# Patient Record
Sex: FEMALE | Race: WHITE | NOT HISPANIC OR LATINO | Employment: FULL TIME | URBAN - NONMETROPOLITAN AREA
[De-identification: names, ages, dates, MRNs, and addresses within clinical notes are randomized per-mention and may not be internally consistent; named-entity substitution may affect disease eponyms.]

---

## 2017-02-15 ENCOUNTER — APPOINTMENT (OUTPATIENT)
Dept: ULTRASOUND IMAGING | Facility: HOSPITAL | Age: 52
End: 2017-02-15

## 2017-02-15 ENCOUNTER — ANESTHESIA EVENT (OUTPATIENT)
Dept: PERIOP | Facility: HOSPITAL | Age: 52
End: 2017-02-15

## 2017-02-15 ENCOUNTER — HOSPITAL ENCOUNTER (INPATIENT)
Facility: HOSPITAL | Age: 52
LOS: 3 days | Discharge: HOME | End: 2017-02-18
Attending: EMERGENCY MEDICINE | Admitting: INTERNAL MEDICINE

## 2017-02-15 ENCOUNTER — PREP FOR SURGERY (OUTPATIENT)
Dept: GASTROENTEROLOGY | Facility: CLINIC | Age: 52
End: 2017-02-15

## 2017-02-15 ENCOUNTER — ANESTHESIA (OUTPATIENT)
Dept: PERIOP | Facility: HOSPITAL | Age: 52
End: 2017-02-15

## 2017-02-15 ENCOUNTER — APPOINTMENT (OUTPATIENT)
Dept: CT IMAGING | Facility: HOSPITAL | Age: 52
End: 2017-02-15

## 2017-02-15 DIAGNOSIS — K80.50 CHOLEDOCHOLITHIASIS: Primary | ICD-10-CM

## 2017-02-15 DIAGNOSIS — K83.1 OBSTRUCTIVE JAUNDICE: ICD-10-CM

## 2017-02-15 LAB
ALBUMIN SERPL-MCNC: 4.1 G/DL (ref 3.5–5)
ALBUMIN/GLOB SERPL: 1.3 G/DL (ref 1–2)
ALP SERPL-CCNC: 109 U/L (ref 38–126)
ALT SERPL W P-5'-P-CCNC: 615 U/L (ref 13–69)
ANION GAP SERPL CALCULATED.3IONS-SCNC: 12.7 MMOL/L
AST SERPL-CCNC: 660 U/L (ref 15–46)
BACTERIA UR QL AUTO: ABNORMAL /HPF
BASOPHILS # BLD AUTO: 0.03 10*3/MM3 (ref 0–0.2)
BASOPHILS NFR BLD AUTO: 0.6 % (ref 0–2.5)
BILIRUB SERPL-MCNC: 2 MG/DL (ref 0.2–1.3)
BILIRUB UR QL STRIP: ABNORMAL
BUN BLD-MCNC: 13 MG/DL (ref 7–20)
BUN/CREAT SERPL: 16.3 (ref 7.1–23.5)
CALCIUM SPEC-SCNC: 10.2 MG/DL (ref 8.4–10.2)
CHLORIDE SERPL-SCNC: 106 MMOL/L (ref 98–107)
CLARITY UR: CLEAR
CO2 SERPL-SCNC: 28 MMOL/L (ref 26–30)
COLOR UR: YELLOW
CREAT BLD-MCNC: 0.8 MG/DL (ref 0.6–1.3)
DEPRECATED RDW RBC AUTO: 43.2 FL (ref 37–54)
EOSINOPHIL # BLD AUTO: 0.17 10*3/MM3 (ref 0–0.7)
EOSINOPHIL NFR BLD AUTO: 3.3 % (ref 0–7)
ERYTHROCYTE [DISTWIDTH] IN BLOOD BY AUTOMATED COUNT: 12 % (ref 11.5–14.5)
GFR SERPL CREATININE-BSD FRML MDRD: 76 ML/MIN/1.73
GLOBULIN UR ELPH-MCNC: 3.1 GM/DL
GLUCOSE BLD-MCNC: 108 MG/DL (ref 74–98)
GLUCOSE UR STRIP-MCNC: NEGATIVE MG/DL
HCT VFR BLD AUTO: 45.1 % (ref 37–47)
HGB BLD-MCNC: 15.7 G/DL (ref 12–16)
HGB UR QL STRIP.AUTO: ABNORMAL
HOLD SPECIMEN: NORMAL
HYALINE CASTS UR QL AUTO: ABNORMAL /LPF
IMM GRANULOCYTES # BLD: 0.04 10*3/MM3 (ref 0–0.06)
IMM GRANULOCYTES NFR BLD: 0.8 % (ref 0–0.6)
INR PPP: 1.03 (ref 0.9–1.1)
KETONES UR QL STRIP: NEGATIVE
LEUKOCYTE ESTERASE UR QL STRIP.AUTO: NEGATIVE
LIPASE SERPL-CCNC: 142 U/L (ref 23–300)
LYMPHOCYTES # BLD AUTO: 1.53 10*3/MM3 (ref 0.6–3.4)
LYMPHOCYTES NFR BLD AUTO: 29.9 % (ref 10–50)
MCH RBC QN AUTO: 33.7 PG (ref 27–31)
MCHC RBC AUTO-ENTMCNC: 34.8 G/DL (ref 30–37)
MCV RBC AUTO: 96.8 FL (ref 81–99)
MONOCYTES # BLD AUTO: 0.46 10*3/MM3 (ref 0–0.9)
MONOCYTES NFR BLD AUTO: 9 % (ref 0–12)
MUCOUS THREADS URNS QL MICRO: ABNORMAL /HPF
NEUTROPHILS # BLD AUTO: 2.88 10*3/MM3 (ref 2–6.9)
NEUTROPHILS NFR BLD AUTO: 56.4 % (ref 37–80)
NITRITE UR QL STRIP: NEGATIVE
NRBC BLD MANUAL-RTO: 0 /100 WBC (ref 0–0)
PH UR STRIP.AUTO: 6 [PH] (ref 5–8)
PLATELET # BLD AUTO: 174 10*3/MM3 (ref 130–400)
PMV BLD AUTO: 10.7 FL (ref 6–12)
POTASSIUM BLD-SCNC: 4.7 MMOL/L (ref 3.5–5.1)
PROT SERPL-MCNC: 7.2 G/DL (ref 6.3–8.2)
PROT UR QL STRIP: NEGATIVE
PROTHROMBIN TIME: 11.3 SECONDS (ref 9.3–12.1)
RBC # BLD AUTO: 4.66 10*6/MM3 (ref 4.2–5.4)
RBC # UR: ABNORMAL /HPF
REF LAB TEST METHOD: ABNORMAL
SODIUM BLD-SCNC: 142 MMOL/L (ref 137–145)
SP GR UR STRIP: 1.02 (ref 1–1.03)
SQUAMOUS #/AREA URNS HPF: ABNORMAL /HPF
UROBILINOGEN UR QL STRIP: ABNORMAL
WBC NRBC COR # BLD: 5.11 10*3/MM3 (ref 4.8–10.8)
WBC UR QL AUTO: ABNORMAL /HPF
WHOLE BLOOD HOLD SPECIMEN: NORMAL
WHOLE BLOOD HOLD SPECIMEN: NORMAL

## 2017-02-15 PROCEDURE — 76705 ECHO EXAM OF ABDOMEN: CPT

## 2017-02-15 PROCEDURE — 25010000002 PIPERACILLIN SOD-TAZOBACTAM PER 1 G: Performed by: EMERGENCY MEDICINE

## 2017-02-15 PROCEDURE — 93005 ELECTROCARDIOGRAM TRACING: CPT | Performed by: INTERNAL MEDICINE

## 2017-02-15 PROCEDURE — 99284 EMERGENCY DEPT VISIT MOD MDM: CPT

## 2017-02-15 PROCEDURE — 0 IOPAMIDOL 61 % SOLUTION: Performed by: EMERGENCY MEDICINE

## 2017-02-15 PROCEDURE — 81001 URINALYSIS AUTO W/SCOPE: CPT | Performed by: EMERGENCY MEDICINE

## 2017-02-15 PROCEDURE — 85025 COMPLETE CBC W/AUTO DIFF WBC: CPT | Performed by: EMERGENCY MEDICINE

## 2017-02-15 PROCEDURE — 74177 CT ABD & PELVIS W/CONTRAST: CPT

## 2017-02-15 PROCEDURE — 99253 IP/OBS CNSLTJ NEW/EST LOW 45: CPT | Performed by: INTERNAL MEDICINE

## 2017-02-15 PROCEDURE — 25010000002 ONDANSETRON PER 1 MG: Performed by: EMERGENCY MEDICINE

## 2017-02-15 PROCEDURE — 25010000002 MORPHINE PER 10 MG: Performed by: INTERNAL MEDICINE

## 2017-02-15 PROCEDURE — 83690 ASSAY OF LIPASE: CPT | Performed by: EMERGENCY MEDICINE

## 2017-02-15 PROCEDURE — 80053 COMPREHEN METABOLIC PANEL: CPT | Performed by: EMERGENCY MEDICINE

## 2017-02-15 PROCEDURE — 99254 IP/OBS CNSLTJ NEW/EST MOD 60: CPT | Performed by: SURGERY

## 2017-02-15 PROCEDURE — 94660 CPAP INITIATION&MGMT: CPT

## 2017-02-15 PROCEDURE — 85610 PROTHROMBIN TIME: CPT | Performed by: EMERGENCY MEDICINE

## 2017-02-15 PROCEDURE — 25010000002 MORPHINE PER 10 MG: Performed by: EMERGENCY MEDICINE

## 2017-02-15 PROCEDURE — 99223 1ST HOSP IP/OBS HIGH 75: CPT | Performed by: INTERNAL MEDICINE

## 2017-02-15 RX ORDER — MORPHINE SULFATE 4 MG/ML
4 INJECTION, SOLUTION INTRAMUSCULAR; INTRAVENOUS ONCE
Status: COMPLETED | OUTPATIENT
Start: 2017-02-15 | End: 2017-02-15

## 2017-02-15 RX ORDER — SODIUM CHLORIDE 9 MG/ML
125 INJECTION, SOLUTION INTRAVENOUS CONTINUOUS
Status: DISCONTINUED | OUTPATIENT
Start: 2017-02-15 | End: 2017-02-18 | Stop reason: HOSPADM

## 2017-02-15 RX ORDER — ONDANSETRON 2 MG/ML
4 INJECTION INTRAMUSCULAR; INTRAVENOUS ONCE
Status: COMPLETED | OUTPATIENT
Start: 2017-02-15 | End: 2017-02-16

## 2017-02-15 RX ORDER — SODIUM CHLORIDE 0.9 % (FLUSH) 0.9 %
1-10 SYRINGE (ML) INJECTION AS NEEDED
Status: DISCONTINUED | OUTPATIENT
Start: 2017-02-15 | End: 2017-02-16 | Stop reason: HOSPADM

## 2017-02-15 RX ORDER — SODIUM CHLORIDE 0.9 % (FLUSH) 0.9 %
10 SYRINGE (ML) INJECTION AS NEEDED
Status: DISCONTINUED | OUTPATIENT
Start: 2017-02-15 | End: 2017-02-18 | Stop reason: HOSPADM

## 2017-02-15 RX ORDER — NALOXONE HCL 0.4 MG/ML
0.4 VIAL (ML) INJECTION
Status: DISCONTINUED | OUTPATIENT
Start: 2017-02-15 | End: 2017-02-18 | Stop reason: HOSPADM

## 2017-02-15 RX ORDER — SODIUM CHLORIDE 9 MG/ML
70 INJECTION, SOLUTION INTRAVENOUS CONTINUOUS PRN
Status: DISCONTINUED | OUTPATIENT
Start: 2017-02-15 | End: 2017-02-16 | Stop reason: HOSPADM

## 2017-02-15 RX ORDER — SODIUM CHLORIDE 0.9 % (FLUSH) 0.9 %
1-10 SYRINGE (ML) INJECTION AS NEEDED
Status: DISCONTINUED | OUTPATIENT
Start: 2017-02-15 | End: 2017-02-18 | Stop reason: HOSPADM

## 2017-02-15 RX ORDER — MORPHINE SULFATE 2 MG/ML
2 INJECTION, SOLUTION INTRAMUSCULAR; INTRAVENOUS EVERY 4 HOURS PRN
Status: DISCONTINUED | OUTPATIENT
Start: 2017-02-15 | End: 2017-02-17

## 2017-02-15 RX ADMIN — MORPHINE SULFATE 2 MG: 2 INJECTION, SOLUTION INTRAMUSCULAR; INTRAVENOUS at 17:50

## 2017-02-15 RX ADMIN — TAZOBACTAM SODIUM AND PIPERACILLIN SODIUM 4.5 G: 500; 4 INJECTION, SOLUTION INTRAVENOUS at 09:29

## 2017-02-15 RX ADMIN — MORPHINE SULFATE 4 MG: 4 INJECTION, SOLUTION INTRAMUSCULAR; INTRAVENOUS at 10:37

## 2017-02-15 RX ADMIN — MORPHINE SULFATE 2 MG: 2 INJECTION, SOLUTION INTRAMUSCULAR; INTRAVENOUS at 23:22

## 2017-02-15 RX ADMIN — ONDANSETRON 4 MG: 2 INJECTION INTRAMUSCULAR; INTRAVENOUS at 10:37

## 2017-02-15 RX ADMIN — IOPAMIDOL 100 ML: 612 INJECTION, SOLUTION INTRAVENOUS at 08:17

## 2017-02-15 RX ADMIN — SODIUM CHLORIDE 125 ML/HR: 9 INJECTION, SOLUTION INTRAVENOUS at 17:52

## 2017-02-15 NOTE — PLAN OF CARE
Problem: Patient Care Overview (Adult)  Goal: Plan of Care Review  Outcome: Ongoing (interventions implemented as appropriate)    02/15/17 1622   Coping/Psychosocial Response Interventions   Plan Of Care Reviewed With patient   Patient Care Overview   Progress no change   Outcome Evaluation   Outcome Summary/Follow up Plan intermittent pain         Problem: Cholecystitis/Cholecystectomy (Adult)  Goal: Signs and Symptoms of Listed Potential Problems Will be Absent or Manageable (Cholecystitis/Cholecystectomy)  Outcome: Ongoing (interventions implemented as appropriate)

## 2017-02-15 NOTE — CONSULTS
Referring provider:  Robin Silvestre DO    Primary care provider: No Known Provider  Previously the patient has seen Dr. Kelley.    Date of consultation:  2/15/2017    Reason for consultation : Abdominal pain.    History:      This is a 51-year-old white female who presented to [facility] emergency room with a three-day history of recurrent epigastric and adjacent right upper quadrant abdominal pain.  The patient claims that she was doing reasonably well until about 3 days ago when she started having sudden episodes of upper abdominal pain.  The pain is described as sharp and rather severe in intensity.  The pain radiated to the right rib area.  The pain lasted for an hour or so.  This was followed by multiple episodes.  The pain was associated with nausea and episodes of emesis.  Earlier today the patient had worse pain.  This prompted her to seek attention.  Of interest that the patient admits to milder episodes of such abdominal pains off and on for the last 3 months.  The pain used to be mild and perhaps one to 2 times a month.  Previously there was no associated nausea or vomiting.    The patient has been feeling nauseated for the last couple of days.  This is described as mild to moderate.  Frequency being several times a day.  The nausea is associated with abdominal pain.  The patient had 4 episodes of emesis earlier on.  The emesis contained previously ingested food.    Since yesterday the patient has noticed lightning of stool color.  There is history of darkening of urine color.  The patient also admits to pruritus.  She denies history of jaundice.There is no history of overt GI bleed (Hematemesis, melena or hematochezia).    The patient has history of occasional reflux perhaps one sent to 3 months.  There is no dysphagia or odynophagia.  She denies history of diarrhea or constipation.  No recent change in bowel habits.  There is no history of liver or pancreatic disease in the  "past.    Over 10 years or so ago the patient had undergone partial colon resection for \"large polyp close to the rectum\" however since then the patient did not have a follow-up colonoscopy.  There is no family history of colon cancer.    The patient is rather overweight.  She has sleep apnea and uses CPAP.  The patient denies history of hypertension, coronary artery disease, congestive heart failure, valvular heart disease or arrhythmias.  There is no history of asthma or COPD.  She denies recent cough.  There is no sputum production.  There is no history of renal insufficiency.  She denies recent hematuria or dysuria.  The patient denies significant arthritic symptoms.  There is no history of SLE or rheumatoid arthritis.  There is no history of anemia.  She denies blood transfusion in the past.  There is no history of bleeding disorder.  The patient denies history of blood clots or PE.  There is no history of seizures or stroke.  The patient denies history of glaucoma.  There is no history of dizziness.    The patient is status post 2 C-sections, tubal ligation and partial colonic resection in the past.  Her last menstrual period was one year ago.  The patient is a smoker.        Past Medical History   Diagnosis Date   • Sleep apnea        Past Surgical History   Procedure Laterality Date   •  section       x2   • Colon surgery     • Polypectomy         Family History   Problem Relation Age of Onset   • Sarcoidosis Mother    • Cancer Father    • Heart disease Father        Social History   Substance Use Topics   • Smoking status: Current Every Day Smoker     Packs/day: 1.50     Types: Cigarettes   • Smokeless tobacco: Not on file   • Alcohol use Yes      Comment: rarely drinks wine         Review of Systems   Constitutional: Positive for fatigue and unexpected weight change. Negative for activity change, chills and fever.   HENT: Negative for congestion, dental problem, hearing loss, mouth sores, nosebleeds " "and trouble swallowing.    Eyes: Negative for photophobia and pain.   Respiratory: Positive for apnea. Negative for cough, choking, chest tightness, shortness of breath and wheezing.    Cardiovascular: Negative for chest pain, palpitations and leg swelling.   Gastrointestinal: Positive for nausea and vomiting. Negative for abdominal distention, blood in stool, constipation and diarrhea.   Endocrine: Negative for cold intolerance, heat intolerance, polydipsia, polyphagia and polyuria.   Genitourinary: Negative for difficulty urinating, dysuria and hematuria.   Musculoskeletal: Negative for arthralgias and myalgias.   Skin: Negative for color change, pallor, rash and wound.   Allergic/Immunologic: Negative for environmental allergies, food allergies and immunocompromised state.   Neurological: Negative for dizziness, seizures, weakness, light-headedness and headaches.   Hematological: Negative for adenopathy. Does not bruise/bleed easily.   Psychiatric/Behavioral: Negative for agitation, behavioral problems, confusion, hallucinations, sleep disturbance and suicidal ideas. The patient is not nervous/anxious.        No Known Allergies      Current Facility-Administered Medications:   •  morphine injection 2 mg, 2 mg, Intravenous, Q4H PRN, 2 mg at 02/15/17 1750 **AND** naloxone (NARCAN) injection 0.4 mg, 0.4 mg, Intravenous, Q5 Min PRN, Robin Silvestre, DO  •  sodium chloride 0.9 % flush 1-10 mL, 1-10 mL, Intravenous, PRN, Robin Silvestre,   •  sodium chloride 0.9 % flush 10 mL, 10 mL, Intravenous, PRN, Dillon Cruz MD  •  sodium chloride 0.9 % infusion, 125 mL/hr, Intravenous, Continuous, Dillon Cruz MD, Last Rate: 125 mL/hr at 02/15/17 1752, 125 mL/hr at 02/15/17 1752    Blood pressure 122/72, pulse 74, temperature 97.7 °F (36.5 °C), temperature source Oral, resp. rate 16, height 64\" (162.6 cm), weight 290 lb (132 kg), SpO2 98 %.    Physical Exam   Constitutional: She is oriented to person, place, " and time. She appears well-developed and well-nourished. No distress.   HENT:   Head: Normocephalic and atraumatic.   Right Ear: Hearing and external ear normal.   Left Ear: Hearing and external ear normal.   Nose: Nose normal.   Mouth/Throat: Mucous membranes are normal. Mucous membranes are not pale, not dry and not cyanotic. No oral lesions. No oropharyngeal exudate.   Oral mucosa dry.  No source no thrush.   Eyes: Conjunctivae and EOM are normal. Right eye exhibits no discharge. Left eye exhibits no discharge. Scleral icterus is present.   Neck: Trachea normal. Neck supple. No JVD present. No edema present. No thyroid mass and no thyromegaly present.   Cardiovascular: Normal rate, regular rhythm, S2 normal and normal heart sounds.  Exam reveals no gallop, no S3 and no friction rub.    No murmur heard.  Pulmonary/Chest: Effort normal and breath sounds normal. No respiratory distress. She has no wheezes. She has no rales. She exhibits no tenderness.   Abdominal: Soft. Normal appearance and bowel sounds are normal. She exhibits no distension, no ascites and no mass. There is no splenomegaly or hepatomegaly. There is tenderness in the right upper quadrant and epigastric area. There is no rigidity, no rebound and no guarding. No hernia.   Musculoskeletal: She exhibits no tenderness or deformity.       Vascular Status -  Her exam exhibits no right foot edema. Her exam exhibits no left foot edema.  Lymphadenopathy:     She has no cervical adenopathy.        Left: No supraclavicular adenopathy present.   Neurological: She is alert and oriented to person, place, and time. She has normal strength. No cranial nerve deficit or sensory deficit. She exhibits normal muscle tone. Coordination normal.   Skin: Skin is warm and dry. No rash noted. She is not diaphoretic. No cyanosis. No pallor. Nails show no clubbing.   Psychiatric: She has a normal mood and affect. Her behavior is normal. Judgment and thought content normal.    Nursing note and vitals reviewed.      Stigmata of chronic liver disease: None  Asterixis:  None     Laboratory Tests:    Dated 2/15/2017 white count 5.11 hemoglobin 15.7 hematocrit 45.1 and platelet count 174 MCV 96.  Sodium 142 potassium 4.7 chloride 106 CO2 28 BUN 13 creatinine 0.8 and glucose 108 calcium 10.2 albumen 4.1   and alkaline phosphatase 109 total bilirubin 2.0.  PT 11.3 INR 1.03.    Abdominal Imaging:    Dated February 15, 2017 CT of abdomen and pelvis with oral and IV contrast revealed clear lung bases.  Heart normal in size.  Liver was found to be diffusely hypodense.  Gallstones.  CBD dilated measuring 9 mm.  3 mm calcification in the region of the distal common bile duct consistent with stone.  Spleen unremarkable.  No adrenal masses present.  Pancreas appears normal.  Kidneys normal, aorta normal no free fluid or adenopathy.  GI tract normal.    Dated February 15, 2017 right upper quadrant abdominal ultrasound revealed increased echogenicity of the liver suggestive of fatty infiltration.  Stones within the gallbladder.  No gallbladder wall thickening.  No pericholecystic fluid.  CBD measured to 12.6 mm.  Right kidney normal.    Assessment:    1. Recurrent upper abdominal pain likely secondary to common bile duct stones.  2. Recurrent nausea and vomiting secondary to 1.  3. Abnormal liver function tests likely secondary to CBD stone.  4. Dilated common bile duct.  5. Gallstones.  6. History of large colonic polyp resection in the past.    Comorbid conditions: Obesity.  Obstructive sleep apnea.    Recommendations:    1. May have clear liquids for now.  Nothing by mouth after midnight.  2. ERCP for further evaluation and intervention.  Counseling ERCP: Risks benefits alternatives and options were discussed with the patient, and her sister-in-law.  A cartoon was drawn for a better understanding on a white board.  3. Follow-up liver function tests.    4. Discussed with the patient and  her sister-in-law in detail.  Opportunity was given to ask questions.  5. The patient should undergo a colonoscopy in the future.      Chaitanya Stanley MD

## 2017-02-15 NOTE — ED PROVIDER NOTES
Subjective   History of Present Illness   51F w/ hx of HUGO p/w RUQ and R flank pain.  She describes 2 days of pain that was initially intermittent and sharp but now dull over the last 12 hours and mainly in the right upper quadrant that radiates to her right mid back.  Has had 2 episodes of nonbloody nonbilious emesis last night.  Denies any fevers, chills, diarrhea, difficulty urinating, dysuria, frequency or other specific symptoms.  Has had 2 prior C-sections and a small colon resection for polyps that was done in 2003.  Still has gallbladder and appendix.    Review of Systems   Gastrointestinal: Positive for abdominal pain, nausea and vomiting.   All other systems reviewed and are negative.      History reviewed. No pertinent past medical history.    No Known Allergies    History reviewed. No pertinent past surgical history.    History reviewed. No pertinent family history.    Social History     Social History   • Marital status: N/A     Spouse name: N/A   • Number of children: N/A   • Years of education: N/A     Social History Main Topics   • Smoking status: Current Every Day Smoker   • Smokeless tobacco: None   • Alcohol use Yes   • Drug use: No   • Sexual activity: Defer     Other Topics Concern   • None     Social History Narrative   • None           Objective   Physical Exam   Constitutional: She is oriented to person, place, and time. She appears well-developed and well-nourished. No distress.   Morbidly obese   HENT:   Head: Normocephalic.   Mouth/Throat: Oropharynx is clear and moist. No oropharyngeal exudate.   Eyes: Conjunctivae are normal. No scleral icterus.   Neck: Neck supple. No tracheal deviation present.   Cardiovascular: Normal rate, regular rhythm, normal heart sounds and intact distal pulses.  Exam reveals no gallop and no friction rub.    No murmur heard.  Pulmonary/Chest: Effort normal and breath sounds normal. No stridor. No respiratory distress. She has no wheezes. She has no rales. She  exhibits no tenderness.   Abdominal: Soft. She exhibits no distension and no mass. There is tenderness (RUQ). There is no rebound and no guarding. No hernia.   Musculoskeletal: She exhibits no edema or deformity.   Neurological: She is alert and oriented to person, place, and time.   Skin: Skin is warm and dry. She is not diaphoretic. No erythema. No pallor.   Psychiatric: She has a normal mood and affect. Her behavior is normal.   Nursing note and vitals reviewed.      Procedures         ED Course  ED Course                  MDM   51-year-old female here with acute right upper quadrant pain. Afebrile, mildly hypertensive.  Her palpation in right upper quadrant.  No CVA tenderness to palpation.  Concern for biliary colic versus cholecystitis versus pancreatitis versus transverse colitis versus less likely pyelonephritis or renal stone.  Given her body habitus and unclear diagnosis, will start with CT scan, labs.  Patient deferred any pain control her antiemetic at this time.  She may require an ultrasound, however, I felt this would be difficult given her body habitus.    9:42 AM Labs show elevated transaminases and total bilirubin and CT scan shows gallstones w/ CBD 9mm and area of calcification in distal cbd c/f choledocholithiasis.  Called and discussed with the surgeon after starting on maintenance IV fluids, dose of Zosyn, and he recommended holding the ED until he could see her to make further recommendations.  He felt that she may require transfer for GI coverage.  10:57 AM Discussed w/ surgeon who agrees to care for patient if admitted but felt pt needs EGD prior to cholecystectomy. Discussed w/ GI (Jaden) who agreed to EGD but wanted PT-INR. Will discuss w/ hospitalist now.   11:02 AM Discussed w/ hospitalist and will admit to telemetry for further care.     Final diagnoses:   Choledocholithiasis            Dillon Cruz MD  02/15/17 [id number]

## 2017-02-15 NOTE — H&P
Patient Care Team:  No Known Provider as PCP - General    Chief complaint Abdominal Pain     Subjective     Flank Pain   Associated symptoms include abdominal pain. Pertinent negatives include no fever.   Patient is a 51 year old female with no past medical history. She has been having right upper qadrant pain on and off for about 6 months. She describes the pain as constant dull pain that is radiating to her back. Since the past 3 days the pain has become more severe and unbearable which caused her to come into there ER today. She had a 8/10 pain last night. The pain is proved by laying down and eating. She took some ibproufin that has not helped. She does have nausea and vomited 4 times yesterday.    She is admitted to the floor and is now comfortable due to analgesics.CT indicated a common bile duct dilation    Review of Systems   Constitutional: Negative for activity change, appetite change, chills, diaphoresis, fatigue and fever.   HENT: Negative.    Eyes: Negative.    Respiratory: Negative.    Cardiovascular: Negative.    Gastrointestinal: Positive for abdominal pain, nausea and vomiting. Negative for blood in stool, constipation and diarrhea.   Endocrine: Negative.    Genitourinary: Positive for flank pain. Negative for hematuria.   Skin: Negative.    Allergic/Immunologic: Negative.    Neurological: Negative.    Psychiatric/Behavioral: Negative.         Past Medical History   Diagnosis Date   • Sleep apnea      Past Surgical History   Procedure Laterality Date   •  section       x2   • Colon surgery     • Polypectomy       Family History   Problem Relation Age of Onset   • Sarcoidosis Mother    • Cancer Father    • Heart disease Father      Social History   Substance Use Topics   • Smoking status: Current Every Day Smoker     Packs/day: 1.50     Types: Cigarettes   • Smokeless tobacco: None   • Alcohol use Yes      Comment: rarely drinks wine     Prescriptions Prior to Admission   Medication Sig  Dispense Refill Last Dose   • NON FORMULARY         Allergies:  Review of patient's allergies indicates no known allergies.    Objective      Vital Signs  Temp:  [97.9 °F (36.6 °C)-98.1 °F (36.7 °C)] 98.1 °F (36.7 °C)  Heart Rate:  [66-85] 66  Resp:  [16-18] 16  BP: (117-166)/() 117/71    Physical Exam   Constitutional: She is oriented to person, place, and time. She appears well-developed and well-nourished.   HENT:   Head: Normocephalic and atraumatic.   Eyes: Conjunctivae and EOM are normal.   Neck: Normal range of motion. Neck supple.   Cardiovascular: Normal rate, regular rhythm and normal heart sounds.    Pulmonary/Chest: Effort normal and breath sounds normal. No respiratory distress.   Abdominal: Soft. Normal appearance. There is tenderness in the right upper quadrant. There is guarding.   Neurological: She is alert and oriented to person, place, and time.   Skin: Skin is warm.   Psychiatric: She has a normal mood and affect. Her behavior is normal.       Results Review:   I reviewed the patient's new clinical results.      Assessment/Plan     Active Problems:    Choledocholithiasis  Hx of colon resection  Hx of Sleep apnea    Assessment & Plan    1.) Surgery and gastroenterology consult  2.) CPAP  3.) EKG  4.) Control pain  I discussed the patients findings and my recommendations with patient and family    Erlin Santiago  02/15/17  1:44 PM

## 2017-02-15 NOTE — H&P
[facility]   HISTORY AND PHYSICAL      Name:  Lea Thompson   Age:  51 y.o.  Sex:  female  :  1965  MRN:  [id number]   Visit Number:  [id number]  Admission Date:  2/15/2017  Date Of Service:  02/15/17  Primary Care Physician:  No Known Provider    History Obtained From:    patient    Chief Complaint:     Abdominal pain     History Of Presenting Illness:      Patient is a 51-year-old  female with a history of obstructive sleep apnea on CPAP who has had 6 months of right upper quadrant pain.  It usually comes in the right upper quadrant and radiates to the right flank.  She hasn't noticed anything that made it better or worse.  He has had it on and off for months but is worse in the last couple weeks and especially the last 12 hours.  She could not sleep last night.  It's 8 out of 10 in intensity at present.  She had vomiting ×4 within the past 12 hours.  It again is radiating to the back.  She took 2 Motrin which did not help.  Laying down flat makes it worse as well.  She last ate at 6:30 AM this morning.  She's noticed like-colored stools.  Denies any melanotic stools or hematochezia.  Denies any hematemesis.  He has not had this investigated before.  She has had a colon resection in  for large colon polyps but has not had a colonoscopy since then.  Her lipase was normal.  CT abdomen and pelvis showed dilated common bile duct at 9 mm consistent with choledocholithiasis.  Surgery as well as gastroenterology have been consulted.    Review Of Systems:     General ROS: negative  Psychological ROS: negative  Ophthalmic ROS: negative  ENT ROS: negative  Allergy and Immunology ROS: negative  Hematological and Lymphatic ROS: negative  Endocrine ROS: negative  Breast ROS: negative  Respiratory ROS: negative  Cardiovascular ROS: negative  Gastrointestinal ROS: positive for - abdominal pain, change in stools and nausea/vomiting  Genito-Urinary ROS: negative  Musculoskeletal  ROS: negative  Neurological ROS: negative  Dermatological ROS: negative       Past Medical History:    Obstructive sleep apnea, colon polyps    Past Surgical history:     ×2, colon resection for polyps, colonoscopy in       Social History:    Patient has been smoking since she was a teenager she did quit for 8 years.  However she smokes up to 3 packs per day at times.  Currently smoking a pack and half per day.  Denies alcohol or drugs.  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  She has a brother and sister who are healthy.  Grandfather and father had prostate cancer    Allergies:      Review of patient's allergies indicates no known allergies.    Home Medications:    Prior to Admission Medications     Prescriptions Last Dose Informant Patient Reported? Taking?    NON FORMULARY   Yes Yes             Hospital Scheduled Meds:           sodium chloride 125 mL/hr        Vital Signs:    Temp:  [97.9 °F (36.6 °C)-98.1 °F (36.7 °C)] 98.1 °F (36.7 °C)  Heart Rate:  [66-85] 66  Resp:  [16-18] 16  BP: (117-166)/() 117/71    Last 3 weights    02/15/17  0712   Weight: 290 lb (132 kg)       Body mass index is 49.78 kg/(m^2).    Physical Exam:      General Appearance:    Alert, cooperative, in no acute distress   Head:    Normocephalic, without obvious abnormality, atraumatic   Eyes:            Lids and lashes normal, conjunctivae and sclerae normal, no   icterus, no pallor, corneas clear, PERRLA   Ears:    Ears appear intact with no abnormalities noted   Throat:   No oral lesions, no thrush, oral mucosa moist   Neck:   No adenopathy, supple, trachea midline, no thyromegaly, no     carotid bruit, no JVD   Back:     No kyphosis present, no scoliosis present, no skin lesions,       erythema or scars, no tenderness to percussion or                   palpation,   range of motion normal   Lungs:      Clear to auscultation,respirations regular, even and                   unlabored    Heart:    Regular rhythm and normal rate, normal S1 and S2, no            murmur, no gallop, no rub, no click   Breast Exam:    Deferred   Abdomen:     positive bowel sounds, no masses, no organomegaly, positive Linn's sign, no guarding or rigidity noted.     Genitalia:    Deferred   Extremities:   Moves all extremities well, no edema, no cyanosis, no              redness   Pulses:   Pulses palpable and equal bilaterally   Skin:   No bleeding, bruising or rash   Lymph nodes:   No palpable adenopathy   Neurologic:   Cranial nerves 2 - 12 grossly intact, sensation intact, DTR        present and equal bilaterally       EKG:      Pending    Telemetry:      Normal sinus rhythm    I have personally looked at both the EKG and the telemetry strips.    Labs:      Results from last 7 days  Lab Units 02/15/17  0837   WBC 10*3/mm3 5.11   HEMOGLOBIN g/dL 15.7   HEMATOCRIT % 45.1   MCV fL 96.8   MCHC g/dL 34.8   PLATELETS 10*3/mm3 174   INR  1.03           Results from last 7 days  Lab Units 02/15/17  0837   SODIUM mmol/L 142   POTASSIUM mmol/L 4.7   CHLORIDE mmol/L 106   TOTAL CO2 mmol/L 28.0   BUN mg/dL 13   CREATININE mg/dL 0.80   EGFR IF NONAFRICN AM mL/min/1.73 76   CALCIUM mg/dL 10.2   GLUCOSE mg/dL 108*   ALBUMIN g/dL 4.10   BILIRUBIN mg/dL 2.0*   ALK PHOS U/L 109   AST (SGOT) U/L 660*   ALT (SGPT) U/L 615*   Estimated Creatinine Clearance: 115 mL/min (by C-G formula based on Cr of 0.8).  No results found for: AMMONIA          No results found for: HGBA1C  No results found for: TSH, FREET4  No results found for: PREGTESTUR, PREGSERUM, HCG, HCGQUANT  Pain Management Panel     There is no flowsheet data to display.                          Radiology:    Imaging Results (last 7 days)     Procedure Component Value Units Date/Time    CT Abdomen Pelvis With Contrast [[id number]] Collected:  02/15/17 0844     Updated:  02/15/17 0849    Narrative:        PROCEDURE: CT ABDOMEN PELVIS W CONTRAST-     HISTORY:  RUQ abd pain     COMPARISON:  None .     TECHNIQUE: Multiple axial CT images were obtained from the lung bases  through the pubic symphysis following the administration of Isovue 300  and oral contrast.      FINDINGS:      ABDOMEN: The lung bases are clear. The heart is normal in size. The  liver is diffusely hypodense consistent with fatty infiltration. Stones  are present within the gallbladder. The common duct is dilated measuring  9 mm. There is an approximately 3 mm calcification in the region of the  distal common duct best appreciated on coronal image 44. The spleen is  unremarkable. No adrenal mass is present.  The pancreas is normal. The  kidneys are normal. The aorta is normal in caliber. There is no free  fluid or adenopathy. The abdominal portions of the GI tract are  unremarkable with no evidence of obstruction.     PELVIS: The appendix is normal. There are colonic diverticula without CT  evidence of diverticulitis. The urinary bladder is unremarkable. There  is no significant free fluid or adenopathy.           Impression:       1. Gallstones with dilatation of the common duct measuring 9 mm. There  is a calcification in the region of the distal common bile duct which  may reflect choledocholithiasis.  2. Colonic diverticula without CT evidence of diverticulitis.     3188.87 mGy.cm          This study was performed with techniques to keep radiation doses as low  as reasonably achievable (ALARA). Individualized dose reduction  techniques using automated exposure control or adjustment of mA and/or  kV according to the patient size were employed.            This report was finalized on 2/15/2017 8:47 AM by Wiliam William M.D..          Assessment:    1.  Choledocholithiasis  2.  Elevated liver enzymes  3.  Obstructive sleep apnea on CPAP    Plan:     Nothing by mouth.  We'll order CPAP.  Check EKG.  Consult Dr. Stanley as well as Dr. Reaves.   We'll check a pregnancy test.  Check lab work in the a.m.  Further recommendations will depend on the clinical course.    oRbin Silvestre,   02/15/17  2:21 PM

## 2017-02-15 NOTE — PAYOR COMM NOTE
"LIZA Hollingsworth  Case Management  Phone:  [phone]; Fax:  [phone]    Re:  Pending Ref #[id number]      Lea Berry (51 y.o. Female)     Date of Birth Social Security Number Address Home Phone MRN    1965  [address] [id number] [phone] [id number]    [employer] Marital Status          None        Admission Date Admission Type Admitting Provider Attending Provider Department, Room/Bed    2/15/17 Emergency Aureliano Swartz MD Pais, Roshan, MD [facility] MED SURG  3, 321/1    Discharge Date Discharge Disposition Discharge Destination                      Attending Provider: Aureliano Swartz MD     Allergies:  No Known Allergies    Isolation:  None   Infection:  None   Code Status:  Not on file    Ht:  64\" (162.6 cm)   Wt:  290 lb (132 kg)    Admission Cmt:  None   Principal Problem:  None                Active Insurance as of 2/15/2017     Primary Coverage     Payor Plan Insurance Group Employer/Plan Group    [employer] [facility] PPO [id number]     Payor Plan Address Payor Plan Phone Number Effective From Effective To    [address] [phone] 1/1/2017     [place] [address]       Subscriber Name Subscriber Birth Date Member ID       LEA BERRY 1965 [id number]                 Emergency Contacts      (Rel.) Home Phone Work Phone Mobile Phone    Ravindra Berry (Spouse) [phone] -- --    Mercedez Avendaño (Mother) [phone] -- --            History & Physical     No notes of this type exist for this encounter.           Emergency Department Notes      Dillon Cruz MD at 2/15/2017  7:22 AM          Subjective   History of Present Illness   F w/ hx of HUGO p/w RUQ and R flank pain.  She describes 2 days of pain that was initially intermittent and sharp but now dull over the last 12 hours and mainly in the right upper quadrant that radiates to her right mid back.  Has had 2 episodes of nonbloody " nonbilious emesis last night.  Denies any fevers, chills, diarrhea, difficulty urinating, dysuria, frequency or other specific symptoms.  Has had 2 prior C-sections and a small colon resection for polyps that was done in 2003.  Still has gallbladder and appendix.    Review of Systems   Gastrointestinal: Positive for abdominal pain, nausea and vomiting. other systems reviewed and are negative.      History reviewed. No pertinent past medical history.    No Known Allergies    History reviewed. No pertinent past surgical history.    History reviewed. No pertinent family history.    Social History     Social History   • Marital status: N/A     Spouse name: N/A   • Number of children: N/A   • Years of education: N/A     Social History Main Topics   • Smoking status: Current Every Day Smoker   • Smokeless tobacco: None   • Alcohol use Yes   • Drug use: No   • Sexual activity: Defer     Other Topics Concern   • None     Social History Narrative   • None           Objective   Physical Exam   Constitutional: She is oriented to person, place, and time. She appears well-developed and well-nourished. No distress.   Morbidly obese   HENT:   Head: Normocephalic.   Mouth/Throat: Oropharynx is clear and moist. No oropharyngeal exudate.   Eyes: Conjunctivae are normal. No scleral icterus.   Neck: Neck supple. No tracheal deviation present.   Cardiovascular: Normal rate, regular rhythm, normal heart sounds and intact distal pulses.  Exam reveals no gallop and no friction rub.  murmur heard.  Pulmonary/Chest: Effort normal and breath sounds normal. No stridor. No respiratory distress. She has no wheezes. She has no rales. She exhibits no tenderness.   Abdominal: Soft. She exhibits no distension and no mass. There is tenderness (RUQ). There is no rebound and no guarding. No hernia.   Musculoskeletal: She exhibits no edema or deformity.   Neurological: She is alert and oriented to person, place, and time.   Skin: Skin is warm and dry.  She is not diaphoretic. No erythema. No pallor.   Psychiatric: She has a normal mood and affect. Her behavior is normal. note and vitals reviewed.      Procedures        ED Course  ED Course                  MDM   year-old female here with acute right upper quadrant pain. Afebrile, mildly hypertensive.  Her palpation in right upper quadrant.  No CVA tenderness to palpation.  Concern for biliary colic versus cholecystitis versus pancreatitis versus transverse colitis versus less likely pyelonephritis or renal stone.  Given her body habitus and unclear diagnosis, will start with CT scan, labs.  Patient deferred any pain control her antiemetic at this time.  She may require an ultrasound, however, I felt this would be difficult given her body habitus.    9:42 AM Labs show elevated transaminases and total bilirubin and CT scan shows gallstones w/ CBD 9mm and area of calcification in distal cbd c/f choledocholithiasis.  Called and discussed with the surgeon after starting on maintenance IV fluids, dose of Zosyn, and he recommended holding the ED until he could see her to make further recommendations.  He felt that she may require transfer for GI coverage.  10:57 AM Discussed w/ surgeon who agrees to care for patient if admitted but felt pt needs EGD prior to cholecystectomy. Discussed w/ GI (Jaden) who agreed to EGD but wanted PT-INR. Will discuss w/ hospitalist now.   11:02 AM Discussed w/ hospitalist and will admit to telemetry for further care.     Final diagnoses:   Choledocholithiasis           Dillon Cruz MD  02/15/17 1102       Electronically signed by Dillon Cruz MD at 2/15/2017 11:02 AM        Vital Signs (last 24 hours)       02/14 0700  -  02/15 0659 02/15 0700  -  02/15 1206   Most Recent    Temp (°F)   97.9 -  98     98 (36.7)    Heart Rate   72 -  85     72    Resp     18     18    BP   138/74 -  166/100     138/74    SpO2 (%)   97 -  98     98            Lab Results (last 24 hours)      Procedure Component Value Units Date/Time    Urinalysis With / Culture If Indicated [[id number]]  (Abnormal) Collected:  02/15/17 0756    Specimen:  Urine from Urine, Clean Catch Updated:  02/15/17 0822     Color, UA Yellow      Appearance, UA Clear      pH, UA 6.0      Specific Gravity, UA 1.020      Glucose, UA Negative      Ketones, UA Negative      Bilirubin, UA Small (1+) (A)      Blood, UA Trace (A)      Protein, UA Negative      Leuk Esterase, UA Negative      Nitrite, UA Negative      Urobilinogen, UA 0.2 E.U./dL     Urinalysis, Microscopic Only [[id number]]  (Abnormal) Collected:  02/15/17 0756    Specimen:  Urine from Urine, Clean Catch Updated:  02/15/17 0822     RBC, UA 3-5 (A) /HPF      WBC, UA 0-2 (A) /HPF      Bacteria, UA Trace (A) /HPF      Squamous Epithelial Cells, UA 7-12 (A) /HPF      Hyaline Casts, UA None Seen /LPF      Mucus, UA Trace /HPF      Methodology Manual Light Microscopy     Lavender Top [[id number]] Collected:  02/15/17 0837    Specimen:  Blood Updated:  02/15/17 0844    [place] Draw [[id number]] Collected:  02/15/17 0837    Specimen:  Blood Updated:  02/15/17 0844    Narrative:       The following orders were created for panel order [place] Draw.  Procedure                               Abnormality         Status                     ---------                               -----------         ------                     Light Blue Top[[id number]]                                    In process                 Lavender Top[[id number]]                                      In process                 Gold Top - SST[[id number]]                                    In process                 Green Top (No Gel)[[id number]]                                                             Please view results for these tests on the individual orders.    Light Blue Top [[id number]] Collected:  02/15/17 0837    Specimen:  Blood Updated:  02/15/17 0844    Gold Top - SST [[id number]] Collected:  02/15/17 0837    Specimen:   Blood Updated:  02/15/17 0844    CBC & Differential [[id number]] Collected:  02/15/17 0837    Specimen:  Blood Updated:  02/15/17 0850    Narrative:       The following orders were created for panel order CBC & Differential.  Procedure                               Abnormality         Status                     ---------                               -----------         ------                     CBC Auto Differential[[id number]]         Abnormal            Final result                 Please view results for these tests on the individual orders.    CBC Auto Differential [[id number]]  (Abnormal) Collected:  02/15/17 0837    Specimen:  Blood Updated:  02/15/17 0850     WBC 5.11 10*3/mm3      RBC 4.66 10*6/mm3      Hemoglobin 15.7 g/dL      Hematocrit 45.1 %      MCV 96.8 fL      MCH 33.7 (H) pg      MCHC 34.8 g/dL      RDW 12.0 %      RDW-SD 43.2 fl      MPV 10.7 fL      Platelets 174 10*3/mm3      Neutrophil % 56.4 %      Lymphocyte % 29.9 %      Monocyte % 9.0 %      Eosinophil % 3.3 %      Basophil % 0.6 %      Immature Grans % 0.8 (H) %      Neutrophils, Absolute 2.88 10*3/mm3      Lymphocytes, Absolute 1.53 10*3/mm3      Monocytes, Absolute 0.46 10*3/mm3      Eosinophils, Absolute 0.17 10*3/mm3      Basophils, Absolute 0.03 10*3/mm3      Immature Grans, Absolute 0.04 10*3/mm3      nRBC 0.0 /100 WBC     Lipase [[id number]]  (Normal) Collected:  02/15/17 0837    Specimen:  Blood Updated:  02/15/17 0858     Lipase 142 U/L     Comprehensive Metabolic Panel [[id number]]  (Abnormal) Collected:  02/15/17 0837    Specimen:  Blood Updated:  02/15/17 0907     Glucose 108 (H) mg/dL      BUN 13 mg/dL      Creatinine 0.80 mg/dL      Sodium 142 mmol/L      Potassium 4.7 mmol/L      Chloride 106 mmol/L      CO2 28.0 mmol/L      Calcium 10.2 mg/dL      Total Protein 7.2 g/dL      Albumin 4.10 g/dL      ALT (SGPT) 615 (C) U/L      AST (SGOT) 660 (C) U/L      Alkaline Phosphatase 109 U/L      Total Bilirubin 2.0 (H) mg/dL      eGFR Non   Amer 76 mL/min/1.73      Globulin 3.1 gm/dL      A/G Ratio 1.3 g/dL      BUN/Creatinine Ratio 16.3      Anion Gap 12.7 mmol/L     Narrative:       Abnormal estimated GFR should be followed by more specific studies to confirm end stage chronic renal disease. The equation used for calculation may not be accurate for patients less than 19 years old, greater than 70 years old, patients at extremes of weight, malnutrition, or with acute renal dysfunction.    Protime-INR [[id number]]  (Normal) Collected:  02/15/17 0837    Specimen:  Blood Updated:  02/15/17 1110     Protime 11.3 Seconds      INR 1.03         Imaging Results (last 24 hours)     Procedure Component Value Units Date/Time    CT Abdomen Pelvis With Contrast [[id number]] Collected:  02/15/17 0844     Updated:  02/15/17 0849    Narrative:       PROCEDURE: CT ABDOMEN PELVIS W CONTRAST-     HISTORY:  RUQ abd pain     COMPARISON:  None .     TECHNIQUE: Multiple axial CT images were obtained from the lung bases  through the pubic symphysis following the administration of Isovue 300  and oral contrast.      FINDINGS:      ABDOMEN: The lung bases are clear. The heart is normal in size. The  liver is diffusely hypodense consistent with fatty infiltration. Stones  are present within the gallbladder. The common duct is dilated measuring  9 mm. There is an approximately 3 mm calcification in the region of the  distal common duct best appreciated on coronal image 44. The spleen is  unremarkable. No adrenal mass is present.  The pancreas is normal. The  kidneys are normal. The aorta is normal in caliber. There is no free  fluid or adenopathy. The abdominal portions of the GI tract are  unremarkable with no evidence of obstruction.     PELVIS: The appendix is normal. There are colonic diverticula without CT  evidence of diverticulitis. The urinary bladder is unremarkable. There  is no significant free fluid or adenopathy.           Impression:       1. Gallstones with  dilatation of the common duct measuring 9 mm. There  is a calcification in the region of the distal common bile duct which  may reflect choledocholithiasis.  2. Colonic diverticula without CT evidence of diverticulitis.     3188.87 mGy.cm          This study was performed with techniques to keep radiation doses as low  as reasonably achievable (ALARA). Individualized dose reduction  techniques using automated exposure control or adjustment of mA and/or  kV according to the patient size were employed.            This report was finalized on 2/15/2017 8:47 AM by Wiliam William M.D..          Orders (last 24 hrs)     Start     Ordered    02/15/17 1102  Inpatient Admission  Once      02/15/17 1102    02/15/17 1102  Tele Bed Request  Once      02/15/17 1102    02/15/17 1057  Protime-INR  STAT      02/15/17 1056    02/15/17 0932  Morphine injection 4 mg  Once      02/15/17 0930    02/15/17 0932  ondansetron (ZOFRAN) injection 4 mg  Once      02/15/17 0930    02/15/17 0932  sodium chloride 0.9 % infusion  Continuous      02/15/17 0930    02/15/17 0912  piperacillin-tazobactam (ZOSYN) 4.5 g in dextrose 100 mL IVPB (premix)  Once      02/15/17 0910    02/15/17 0912  Surgery (on-call MD unless specified)  Once     Specialty:  General Surgery  Provider:  Benjamin Reaves MD    02/15/17 0911    02/15/17 0909  NPO Diet  Diet Effective Now      02/15/17 0908    02/15/17 0817  iopamidol (ISOVUE-300) 61 % injection 100 mL  Once in Imaging      02/15/17 0815    02/15/17 0811  Urinalysis, Microscopic Only  Once      02/15/17 0810    02/15/17 0733  Insert peripheral IV  Once      02/15/17 0732    02/15/17 0733  [place] Draw  Once      02/15/17 0732    02/15/17 0733  CBC & Differential  Once      02/15/17 0732    02/15/17 0733  Comprehensive Metabolic Panel  Once      02/15/17 0732    02/15/17 0733  Lipase  Once      02/15/17 0732    02/15/17 0733  Urinalysis With / Culture If Indicated  Once      02/15/17 0732    02/15/17 0733  CT  Abdomen Pelvis With Contrast  1 Time Imaging     Comments:  IV CONTRAST ONLY      02/15/17 0732    02/15/17 0733  Light Blue Top  PROCEDURE ONCE      02/15/17 0732    02/15/17 0733  Lavender Top  PROCEDURE ONCE      02/15/17 0732    02/15/17 0733  Gold Top - SST  PROCEDURE ONCE      02/15/17 0732    02/15/17 0733  Green Top (No Gel)  PROCEDURE ONCE      02/15/17 0732    02/15/17 0733  CBC Auto Differential  PROCEDURE ONCE      02/15/17 0732    02/15/17 0732  sodium chloride 0.9 % flush 10 mL  As Needed      02/15/17 0732          Physician Progress Notes (last 24 hours) (Notes from 2/14/2017 12:06 PM through 2/15/2017 12:06 PM)     No notes of this type exist for this encounter.           Consult Notes (last 24 hours) (Notes from 2/14/2017 12:06 PM through 2/15/2017 12:06 PM)      Benjamin Reaves MD at 2/15/2017 11:58 AM  Version 1 of 1             Referring Provider: No ref. provider found    Reason for Consultation: Right upper quadrant pain    Patient Care Team:  No Known Provider as PCP - General    Chief complaint   Chief Complaint   Patient presents with   • Flank Pain       Subjective .     History of present illness:  The patient presented to me as a consultation from the ER for evaluation and treatment of a history of 6 months worth of right upper quadrant pain that radiates to the right flank region.  This has progressively worsened over the past few months, severe in nature today for the last 12 hours, associated with nausea, the patient has noted recent history of dark colored urine.    Review of Systems:    Review of Systems - General ROS: negative for - chills, fatigue, fever, hot flashes, malaise or night sweats  Psychological ROS: negative for - behavioral disorder, disorientation, hallucinations, hostility or mood swings  ENT ROS: negative for - nasal polyps, oral lesions, sinus pain, sneezing or sore throat  Breast ROS: negative for - galactorrhea or new or changing breast lumps  Respiratory ROS:  negative for - hemoptysis, orthopnea, pleuritic pain, sputum changes or stridor  Cardiovascular ROS: negative for - dyspnea on exertion, edema, irregular heartbeat, murmur, orthopnea, palpitations or rapid heart rate  Gastrointestinal ROS: negative for - change in stools, gas/bloating, hematemesis, melena or stool incontinence  Genito-Urinary ROS: negative for - dysuria, genital ulcers, nocturia or pelvic pain  Musculoskeletal ROS: negative for - gait disturbance or muscle pain  Neurological ROS: negative for - dizziness, gait disturbance, memory loss, numbness/tingling or seizures    History  History reviewed. No pertinent past medical history., History reviewed. No pertinent past surgical history., History reviewed. No pertinent family history. and   Social History   Substance Use Topics   • Smoking status: Current Every Day Smoker   • Smokeless tobacco: None   • Alcohol use Yes       Objective     Vital Signs   Temp:  [97.9 °F (36.6 °C)-98 °F (36.7 °C)] 98 °F (36.7 °C)  Heart Rate:  [72-85] 72  Resp:  [18] 18  BP: (138-166)/() 138/74    Physical Exam:     General Appearance:    Alert, cooperative, in no acute distress   Head:    Normocephalic, without obvious abnormality, atraumatic   Eyes:            Lids and lashes normal, conjunctivae and sclerae normal, no   icterus, no pallor, corneas clear, PERRLA   Ears:    Ears appear intact with no abnormalities noted   Throat:   No oral lesions, no thrush, oral mucosa moist   Neck:   No adenopathy, supple, trachea midline, no thyromegaly, no   carotid bruit, no JVD   Back:     No kyphosis present, no scoliosis present, no skin lesions,      erythema or scars, no tenderness to percussion or                   palpation,   range of motion normal   Lungs:     Clear to auscultation,respirations regular, even and                  unlabored    Heart:    Regular rhythm and normal rate, normal S1 and S2, no            murmur, no gallop, no rub, no click   Chest Wall:    No  abnormalities observed   Abdomen:     Normal bowel sounds, no masses, no organomegaly, soft        non-tender, non-distended, no guarding, there is palpable tenderness in the right upper quadrant   Extremities:   Moves all extremities well, no edema, no cyanosis, no             redness   Pulses:   Pulses palpable and equal bilaterally   Skin:   No bleeding, bruising or rash   Lymph nodes:   No palpable adenopathy   Neurologic:   Cranial nerves 2 - 12 grossly intact, sensation intact, DTR       present and equal bilaterally       Results Review:   I reviewed the patient's new clinical results.  I reviewed the patient's new imaging results and agree with the interpretation.  I reviewed the patient's other test results and agree with the interpretation      Assessment&#47;Plan     Active Problems:    Choledocholithiasis     Cholelithiasis with chronic cholecystitis    I did have a detailed and extensive discussion with the patient and the  in the ER today along with the nurse and the ER MD.  I think that she has chronic cholecystitis due to cholelithiasis and most likely has increased LFTs and bilirubin due to choledocholithiasis and will need to be evaluated for ERCP prior to eventual laparoscopic cholecystectomy.  We will be getting in contact with the hospitalist service and GI service.  The patient nor the  had any questions for me at the end of the discussion.    I discussed the patients findings and my recommendations with patient, family, nursing staff and consulting provider    Benjamin Reaves MD  02/15/17  11:58 AM    Time: 30 minutes         Electronically signed by Benjamin Reaves MD at 2/15/2017 12:04 PM

## 2017-02-15 NOTE — CONSULTS
Referring Provider: No ref. provider found    Reason for Consultation: Right upper quadrant pain    Patient Care Team:  No Known Provider as PCP - General    Chief complaint   Chief Complaint   Patient presents with   • Flank Pain       Subjective .     History of present illness:  The patient presented to me as a consultation from the ER for evaluation and treatment of a history of 6 months worth of right upper quadrant pain that radiates to the right flank region.  This has progressively worsened over the past few months, severe in nature today for the last 12 hours, associated with nausea, the patient has noted recent history of dark colored urine.    Review of Systems:    Review of Systems - General ROS: negative for - chills, fatigue, fever, hot flashes, malaise or night sweats  Psychological ROS: negative for - behavioral disorder, disorientation, hallucinations, hostility or mood swings  ENT ROS: negative for - nasal polyps, oral lesions, sinus pain, sneezing or sore throat  Breast ROS: negative for - galactorrhea or new or changing breast lumps  Respiratory ROS: negative for - hemoptysis, orthopnea, pleuritic pain, sputum changes or stridor.  Positive history of sleep apnea  Cardiovascular ROS: negative for - dyspnea on exertion, edema, irregular heartbeat, murmur, orthopnea, palpitations or rapid heart rate  Gastrointestinal ROS: negative for - change in stools, gas/bloating, hematemesis, melena or stool incontinence.  There is a history of nausea and right upper quadrant pain  Genito-Urinary ROS: negative for - dysuria, genital ulcers, nocturia or pelvic pain.  There is a history of dark colored urine.  Musculoskeletal ROS: negative for - gait disturbance or muscle pain  Neurological ROS: negative for - dizziness, gait disturbance, memory loss, numbness/tingling or seizures    History  History reviewed. No pertinent past medical history., History reviewed. No pertinent past surgical history., History  reviewed. No pertinent family history. and PSH is positive for CSection x 2    Social History   Substance Use Topics   • Smoking status: Current Every Day Smoker   • Smokeless tobacco: None   • Alcohol use Yes       Objective     Vital Signs   Temp:  [97.9 °F (36.6 °C)-98 °F (36.7 °C)] 98 °F (36.7 °C)  Heart Rate:  [72-85] 72  Resp:  [18] 18  BP: (138-166)/() 138/74    Physical Exam:     General Appearance:    Alert, cooperative, in no acute distress   Head:    Normocephalic, without obvious abnormality, atraumatic   Eyes:            Lids and lashes normal, conjunctivae and sclerae normal, no   icterus, no pallor, corneas clear, PERRLA   Ears:    Ears appear intact with no abnormalities noted   Throat:   No oral lesions, no thrush, oral mucosa moist   Neck:   No adenopathy, supple, trachea midline, no thyromegaly, no   carotid bruit, no JVD   Back:     No kyphosis present, no scoliosis present, no skin lesions,      erythema or scars, no tenderness to percussion or                   palpation,   range of motion normal   Lungs:     Clear to auscultation,respirations regular, even and                  unlabored    Heart:    Regular rhythm and normal rate, normal S1 and S2, no            murmur, no gallop, no rub, no click   Chest Wall:    No abnormalities observed   Abdomen:     Normal bowel sounds, no masses, no organomegaly, soft        non-tender, non-distended, no guarding, there is palpable tenderness in the right upper quadrant   Extremities:   Moves all extremities well, no edema, no cyanosis, no             redness   Pulses:   Pulses palpable and equal bilaterally   Skin:   No bleeding, bruising or rash   Lymph nodes:   No palpable adenopathy   Neurologic:   Cranial nerves 2 - 12 grossly intact, sensation intact, DTR       present and equal bilaterally       Results Review:   I reviewed the patient's new clinical results.  I reviewed the patient's new imaging results and agree with the interpretation.  I  reviewed the patient's other test results and agree with the interpretation      Assessment/Plan     Active Problems:    Choledocholithiasis     Cholelithiasis with chronic cholecystitis    I did have a detailed and extensive discussion with the patient and the  in the ER today along with the nurse and the ER MD.  I think that she has chronic cholecystitis due to cholelithiasis and most likely has increased LFTs and bilirubin due to choledocholithiasis and will need to be evaluated for ERCP prior to eventual laparoscopic cholecystectomy.  We will be getting in contact with the hospitalist service and GI service.  The patient nor the  had any questions for me at the end of the discussion.    I discussed the patients findings and my recommendations with patient, family, nursing staff and consulting provider    Benjamin Reaves MD  02/15/17  11:58 AM    Time: 30 minutes

## 2017-02-16 ENCOUNTER — APPOINTMENT (OUTPATIENT)
Dept: GENERAL RADIOLOGY | Facility: HOSPITAL | Age: 52
End: 2017-02-16
Attending: INTERNAL MEDICINE

## 2017-02-16 LAB
ALBUMIN SERPL-MCNC: 3.8 G/DL (ref 3.5–5)
ALBUMIN/GLOB SERPL: 1.3 G/DL (ref 1–2)
ALP SERPL-CCNC: 106 U/L (ref 38–126)
ALT SERPL W P-5'-P-CCNC: 510 U/L (ref 13–69)
ANION GAP SERPL CALCULATED.3IONS-SCNC: 11.3 MMOL/L
AST SERPL-CCNC: 261 U/L (ref 15–46)
B-HCG UR QL: NEGATIVE
BILIRUB SERPL-MCNC: 0.9 MG/DL (ref 0.2–1.3)
BUN BLD-MCNC: 10 MG/DL (ref 7–20)
BUN/CREAT SERPL: 11.1 (ref 7.1–23.5)
CALCIUM SPEC-SCNC: 8.8 MG/DL (ref 8.4–10.2)
CHLORIDE SERPL-SCNC: 109 MMOL/L (ref 98–107)
CO2 SERPL-SCNC: 27 MMOL/L (ref 26–30)
CREAT BLD-MCNC: 0.9 MG/DL (ref 0.6–1.3)
DEPRECATED RDW RBC AUTO: 45.9 FL (ref 37–54)
ERYTHROCYTE [DISTWIDTH] IN BLOOD BY AUTOMATED COUNT: 12.5 % (ref 11.5–14.5)
GFR SERPL CREATININE-BSD FRML MDRD: 67 ML/MIN/1.73
GLOBULIN UR ELPH-MCNC: 2.9 GM/DL
GLUCOSE BLD-MCNC: 111 MG/DL (ref 74–98)
HCT VFR BLD AUTO: 42.1 % (ref 37–47)
HGB BLD-MCNC: 14 G/DL (ref 12–16)
LIPASE SERPL-CCNC: 64 U/L (ref 23–300)
MCH RBC QN AUTO: 33.3 PG (ref 27–31)
MCHC RBC AUTO-ENTMCNC: 33.3 G/DL (ref 30–37)
MCV RBC AUTO: 100 FL (ref 81–99)
PLATELET # BLD AUTO: 169 10*3/MM3 (ref 130–400)
PMV BLD AUTO: 11 FL (ref 6–12)
POTASSIUM BLD-SCNC: 4.3 MMOL/L (ref 3.5–5.1)
PROT SERPL-MCNC: 6.7 G/DL (ref 6.3–8.2)
RBC # BLD AUTO: 4.21 10*6/MM3 (ref 4.2–5.4)
SODIUM BLD-SCNC: 143 MMOL/L (ref 137–145)
WBC NRBC COR # BLD: 5.03 10*3/MM3 (ref 4.8–10.8)

## 2017-02-16 PROCEDURE — 94799 UNLISTED PULMONARY SVC/PX: CPT

## 2017-02-16 PROCEDURE — 43274 ERCP DUCT STENT PLACEMENT: CPT | Performed by: INTERNAL MEDICINE

## 2017-02-16 PROCEDURE — 25010000002 MIDAZOLAM PER 1 MG: Performed by: NURSE ANESTHETIST, CERTIFIED REGISTERED

## 2017-02-16 PROCEDURE — 25010000002 ONDANSETRON PER 1 MG: Performed by: EMERGENCY MEDICINE

## 2017-02-16 PROCEDURE — 25010000002 SUCCINYLCHOLINE PER 20 MG: Performed by: NURSE ANESTHETIST, CERTIFIED REGISTERED

## 2017-02-16 PROCEDURE — 25010000002 FENTANYL CITRATE (PF) 100 MCG/2ML SOLUTION: Performed by: NURSE ANESTHETIST, CERTIFIED REGISTERED

## 2017-02-16 PROCEDURE — 80053 COMPREHEN METABOLIC PANEL: CPT | Performed by: INTERNAL MEDICINE

## 2017-02-16 PROCEDURE — 25010000002 ONDANSETRON PER 1 MG: Performed by: INTERNAL MEDICINE

## 2017-02-16 PROCEDURE — 25010000002 DEXAMETHASONE PER 1 MG: Performed by: NURSE ANESTHETIST, CERTIFIED REGISTERED

## 2017-02-16 PROCEDURE — 25010000002 PIPERACILLIN SOD-TAZOBACTAM PER 1 G: Performed by: EMERGENCY MEDICINE

## 2017-02-16 PROCEDURE — 25010000002 MORPHINE PER 10 MG: Performed by: INTERNAL MEDICINE

## 2017-02-16 PROCEDURE — 99232 SBSQ HOSP IP/OBS MODERATE 35: CPT | Performed by: SURGERY

## 2017-02-16 PROCEDURE — 0 IOPAMIDOL 61 % SOLUTION: Performed by: INTERNAL MEDICINE

## 2017-02-16 PROCEDURE — C1769 GUIDE WIRE: HCPCS | Performed by: INTERNAL MEDICINE

## 2017-02-16 PROCEDURE — 94640 AIRWAY INHALATION TREATMENT: CPT

## 2017-02-16 PROCEDURE — 94660 CPAP INITIATION&MGMT: CPT

## 2017-02-16 PROCEDURE — 0FC98ZZ EXTIRPATION OF MATTER FROM COMMON BILE DUCT, VIA NATURAL OR ARTIFICIAL OPENING ENDOSCOPIC: ICD-10-PCS | Performed by: INTERNAL MEDICINE

## 2017-02-16 PROCEDURE — 0F798DZ DILATION OF COMMON BILE DUCT WITH INTRALUMINAL DEVICE, VIA NATURAL OR ARTIFICIAL OPENING ENDOSCOPIC: ICD-10-PCS | Performed by: INTERNAL MEDICINE

## 2017-02-16 PROCEDURE — 99233 SBSQ HOSP IP/OBS HIGH 50: CPT | Performed by: INTERNAL MEDICINE

## 2017-02-16 PROCEDURE — 0FC58ZZ EXTIRPATION OF MATTER FROM RIGHT HEPATIC DUCT, VIA NATURAL OR ARTIFICIAL OPENING ENDOSCOPIC: ICD-10-PCS | Performed by: INTERNAL MEDICINE

## 2017-02-16 PROCEDURE — 85027 COMPLETE CBC AUTOMATED: CPT | Performed by: INTERNAL MEDICINE

## 2017-02-16 PROCEDURE — 25010000002 PROPOFOL 200 MG/20ML EMULSION: Performed by: NURSE ANESTHETIST, CERTIFIED REGISTERED

## 2017-02-16 PROCEDURE — C2625 STENT, NON-COR, TEM W/DEL SY: HCPCS | Performed by: INTERNAL MEDICINE

## 2017-02-16 PROCEDURE — 81025 URINE PREGNANCY TEST: CPT | Performed by: INTERNAL MEDICINE

## 2017-02-16 PROCEDURE — 25010000002 METOCLOPRAMIDE PER 10 MG: Performed by: NURSE ANESTHETIST, CERTIFIED REGISTERED

## 2017-02-16 PROCEDURE — 83690 ASSAY OF LIPASE: CPT | Performed by: INTERNAL MEDICINE

## 2017-02-16 PROCEDURE — 83036 HEMOGLOBIN GLYCOSYLATED A1C: CPT | Performed by: INTERNAL MEDICINE

## 2017-02-16 DEVICE — BILIARY STENT WITH NAVIFLEXTM RX DELIVERY SYSTEM
Type: IMPLANTABLE DEVICE | Status: FUNCTIONAL
Brand: ADVANIX™ BILIARY

## 2017-02-16 RX ORDER — PROMETHAZINE HYDROCHLORIDE 25 MG/ML
6.25 INJECTION, SOLUTION INTRAMUSCULAR; INTRAVENOUS EVERY 6 HOURS PRN
Status: DISCONTINUED | OUTPATIENT
Start: 2017-02-16 | End: 2017-02-16 | Stop reason: HOSPADM

## 2017-02-16 RX ORDER — PANTOPRAZOLE SODIUM 40 MG/10ML
40 INJECTION, POWDER, LYOPHILIZED, FOR SOLUTION INTRAVENOUS
Status: DISCONTINUED | OUTPATIENT
Start: 2017-02-16 | End: 2017-02-18 | Stop reason: HOSPADM

## 2017-02-16 RX ORDER — ONDANSETRON 2 MG/ML
4 INJECTION INTRAMUSCULAR; INTRAVENOUS EVERY 6 HOURS PRN
Status: DISCONTINUED | OUTPATIENT
Start: 2017-02-16 | End: 2017-02-18 | Stop reason: HOSPADM

## 2017-02-16 RX ORDER — GLYCOPYRROLATE 0.2 MG/ML
INJECTION INTRAMUSCULAR; INTRAVENOUS AS NEEDED
Status: DISCONTINUED | OUTPATIENT
Start: 2017-02-16 | End: 2017-02-16 | Stop reason: SURG

## 2017-02-16 RX ORDER — SUCCINYLCHOLINE CHLORIDE 20 MG/ML
INJECTION INTRAMUSCULAR; INTRAVENOUS AS NEEDED
Status: DISCONTINUED | OUTPATIENT
Start: 2017-02-16 | End: 2017-02-16 | Stop reason: SURG

## 2017-02-16 RX ORDER — METOCLOPRAMIDE HYDROCHLORIDE 5 MG/ML
INJECTION INTRAMUSCULAR; INTRAVENOUS AS NEEDED
Status: DISCONTINUED | OUTPATIENT
Start: 2017-02-16 | End: 2017-02-16 | Stop reason: SURG

## 2017-02-16 RX ORDER — IPRATROPIUM BROMIDE AND ALBUTEROL SULFATE 2.5; .5 MG/3ML; MG/3ML
SOLUTION RESPIRATORY (INHALATION)
Status: COMPLETED
Start: 2017-02-16 | End: 2017-02-16

## 2017-02-16 RX ORDER — DEXAMETHASONE SODIUM PHOSPHATE 4 MG/ML
INJECTION, SOLUTION INTRA-ARTICULAR; INTRALESIONAL; INTRAMUSCULAR; INTRAVENOUS; SOFT TISSUE AS NEEDED
Status: DISCONTINUED | OUTPATIENT
Start: 2017-02-16 | End: 2017-02-16 | Stop reason: SURG

## 2017-02-16 RX ORDER — FENTANYL CITRATE 50 UG/ML
INJECTION, SOLUTION INTRAMUSCULAR; INTRAVENOUS AS NEEDED
Status: DISCONTINUED | OUTPATIENT
Start: 2017-02-16 | End: 2017-02-16 | Stop reason: SURG

## 2017-02-16 RX ORDER — MIDAZOLAM HYDROCHLORIDE 1 MG/ML
INJECTION INTRAMUSCULAR; INTRAVENOUS AS NEEDED
Status: DISCONTINUED | OUTPATIENT
Start: 2017-02-16 | End: 2017-02-16 | Stop reason: SURG

## 2017-02-16 RX ORDER — IPRATROPIUM BROMIDE AND ALBUTEROL SULFATE 2.5; .5 MG/3ML; MG/3ML
3 SOLUTION RESPIRATORY (INHALATION) ONCE
Status: COMPLETED | OUTPATIENT
Start: 2017-02-16 | End: 2017-02-16

## 2017-02-16 RX ORDER — MEPERIDINE HYDROCHLORIDE 50 MG/ML
50 INJECTION INTRAMUSCULAR; INTRAVENOUS; SUBCUTANEOUS ONCE
Status: DISCONTINUED | OUTPATIENT
Start: 2017-02-16 | End: 2017-02-16 | Stop reason: HOSPADM

## 2017-02-16 RX ORDER — ROCURONIUM BROMIDE 10 MG/ML
INJECTION, SOLUTION INTRAVENOUS AS NEEDED
Status: DISCONTINUED | OUTPATIENT
Start: 2017-02-16 | End: 2017-02-16 | Stop reason: SURG

## 2017-02-16 RX ORDER — NEOSTIGMINE METHYLSULFATE 5 MG/5 ML
SYRINGE (ML) INTRAVENOUS AS NEEDED
Status: DISCONTINUED | OUTPATIENT
Start: 2017-02-16 | End: 2017-02-16 | Stop reason: SURG

## 2017-02-16 RX ORDER — PROPOFOL 10 MG/ML
INJECTION, EMULSION INTRAVENOUS AS NEEDED
Status: DISCONTINUED | OUTPATIENT
Start: 2017-02-16 | End: 2017-02-16 | Stop reason: SURG

## 2017-02-16 RX ORDER — IPRATROPIUM BROMIDE AND ALBUTEROL SULFATE 2.5; .5 MG/3ML; MG/3ML
3 SOLUTION RESPIRATORY (INHALATION)
Status: DISCONTINUED | OUTPATIENT
Start: 2017-02-16 | End: 2017-02-17

## 2017-02-16 RX ADMIN — PROPOFOL 150 MG: 10 INJECTION, EMULSION INTRAVENOUS at 07:35

## 2017-02-16 RX ADMIN — IPRATROPIUM BROMIDE AND ALBUTEROL SULFATE 3 ML: 2.5; .5 SOLUTION RESPIRATORY (INHALATION) at 09:08

## 2017-02-16 RX ADMIN — SUCCINYLCHOLINE CHLORIDE 120 MG: 20 INJECTION, SOLUTION INTRAMUSCULAR; INTRAVENOUS at 07:35

## 2017-02-16 RX ADMIN — FENTANYL CITRATE 100 MCG: 50 INJECTION, SOLUTION INTRAMUSCULAR; INTRAVENOUS at 07:58

## 2017-02-16 RX ADMIN — GLYCOPYRROLATE 0.2 MG: 0.2 INJECTION, SOLUTION INTRAMUSCULAR; INTRAVENOUS at 07:15

## 2017-02-16 RX ADMIN — IPRATROPIUM BROMIDE AND ALBUTEROL SULFATE 3 ML: .5; 3 SOLUTION RESPIRATORY (INHALATION) at 09:08

## 2017-02-16 RX ADMIN — MORPHINE SULFATE 2 MG: 2 INJECTION, SOLUTION INTRAMUSCULAR; INTRAVENOUS at 05:22

## 2017-02-16 RX ADMIN — GLYCOPYRROLATE 0.8 MG: 0.2 INJECTION, SOLUTION INTRAMUSCULAR; INTRAVENOUS at 08:39

## 2017-02-16 RX ADMIN — ONDANSETRON 4 MG: 2 INJECTION INTRAMUSCULAR; INTRAVENOUS at 05:48

## 2017-02-16 RX ADMIN — LIDOCAINE HYDROCHLORIDE 40 MG: 20 INJECTION, SOLUTION INTRAVENOUS at 07:35

## 2017-02-16 RX ADMIN — DEXAMETHASONE SODIUM PHOSPHATE 4 MG: 4 INJECTION, SOLUTION INTRAMUSCULAR; INTRAVENOUS at 08:08

## 2017-02-16 RX ADMIN — ONDANSETRON 4 MG: 2 INJECTION INTRAMUSCULAR; INTRAVENOUS at 18:53

## 2017-02-16 RX ADMIN — FENTANYL CITRATE 100 MCG: 50 INJECTION, SOLUTION INTRAMUSCULAR; INTRAVENOUS at 07:35

## 2017-02-16 RX ADMIN — Medication 4 MG: at 08:39

## 2017-02-16 RX ADMIN — ROCURONIUM BROMIDE 10 MG: 10 INJECTION INTRAVENOUS at 07:35

## 2017-02-16 RX ADMIN — METOCLOPRAMIDE 10 MG: 5 INJECTION, SOLUTION INTRAMUSCULAR; INTRAVENOUS at 07:15

## 2017-02-16 RX ADMIN — DEXAMETHASONE SODIUM PHOSPHATE 4 MG: 4 INJECTION, SOLUTION INTRAMUSCULAR; INTRAVENOUS at 07:15

## 2017-02-16 RX ADMIN — FENTANYL CITRATE 50 MCG: 50 INJECTION, SOLUTION INTRAMUSCULAR; INTRAVENOUS at 08:23

## 2017-02-16 RX ADMIN — PANTOPRAZOLE SODIUM 40 MG: 40 INJECTION, POWDER, FOR SOLUTION INTRAVENOUS at 11:58

## 2017-02-16 RX ADMIN — SODIUM CHLORIDE 125 ML/HR: 9 INJECTION, SOLUTION INTRAVENOUS at 12:38

## 2017-02-16 RX ADMIN — SODIUM CHLORIDE: 9 INJECTION, SOLUTION INTRAVENOUS at 07:31

## 2017-02-16 RX ADMIN — MIDAZOLAM HYDROCHLORIDE 2 MG: 1 INJECTION, SOLUTION INTRAMUSCULAR; INTRAVENOUS at 07:15

## 2017-02-16 RX ADMIN — ONDANSETRON 4 MG: 2 INJECTION INTRAMUSCULAR; INTRAVENOUS at 07:15

## 2017-02-16 RX ADMIN — TAZOBACTAM SODIUM AND PIPERACILLIN SODIUM 3.38 G: 500; 4 INJECTION, SOLUTION INTRAVENOUS at 08:06

## 2017-02-16 RX ADMIN — ROCURONIUM BROMIDE 20 MG: 10 INJECTION INTRAVENOUS at 07:45

## 2017-02-16 NOTE — PLAN OF CARE
Problem: Cholecystitis/Cholecystectomy (Adult)  Goal: Signs and Symptoms of Listed Potential Problems Will be Absent or Manageable (Cholecystitis/Cholecystectomy)  Outcome: Ongoing (interventions implemented as appropriate)    02/16/17 0329   Cholecystitis/Cholecystectomy   Problems Assessed (Cholecystitis/Cholecystectomy) all   Problems Present (Cholecystitis/Cholecystectomy) pain

## 2017-02-16 NOTE — OP NOTE
PROCEDURE:  ERCP with endoscopic sphincterotomy,  lithotripsies, removal of right intrahepatic ductal and common bile duct stones as well as placement of a 8.5 Malay-7 cm biliary stent.    PROCEDURE DATE: February 16, 2017.      REFERRING PHYSICIAN: Robin Blake D.O.     INSTRUMENT:  Olympus TJF-180 QV video duodenoscope.     INDICATIONS:  This is a 51-year-old white female with history of recurrent right upper quadrant abdominal pain, abnormal liver function tests, and dilated common bile duct.  Currently undergoing ERCP for further evaluation and intervention.     BIOPSIES: None.     MEDICATIONS:  Procedure was done under general anesthesia administered through endotracheal intubation by Anesthesia Service.  The patient has been on IV antibiotics.     CONSENT/PREPROCEDURAL EVALUATION:  The risks, benefits, alternatives and options were discussed with the patient and his family in detail and informed consent was obtained prior to the procedure.  History and physical examination were performed and nothing precluded the test.       REPORT:  The patient was placed in prone position and the instrument was inserted into the mouth and esophagus was intubated under direct vision without difficulty.     ESOPHAGUS:  Visualized portions normal.  A small sliding hiatal hernia was seen.     STOMACH:  Erythematous gastritis at antrum.       DUODENUM:  Erythematous bulbar duodenitis.  Second portion normal.      MINOR AMPULLA: Minor ampulla was in part visualized and appeared to be within normal limits.  However this was not cannulated.      MAJOR AMPULLA:  Major ampulla appeared to be of normal size and shape.  A periampullary diverticulum was noted.    PANCREATIC DUCT:  Pancreatic duct was not cannulated or injected during this exam.     CHOLANGIOGRAM:  Selective cannulation of the duct was achieved using a 44RX-sphincterotome and a 0.035 inch Jag wire was placed deep into the biliary tree.  Upon injection revealed  cholangiogram.  Thin long intraduodenal segment of the distal common duct consistent with papillary stenosis was seen.    CBD 12  mm.    COMMON HEPATIC DUCT: 9  mm.    INTRAHEPATIC DUCT:   Normal.         CYSTIC DUCT:  Not visualized.                             GALLBLADDER: Not visualized.      INTERVENTION:    1.  Using a 44 Rx sphincterotome over the wire a 9 mm biliary sphincterotomy was performed (Endocut-ERBE) without difficulty.  The sphincterotome was then discontinued.  2.  A 9-12 millimeter biliary balloon was used over the wire and selective cannulation of the right intrahepatic ductal system was achieved.  The right intrahepatic ductal stones was then removed.  Sweep was made at 12 mm balloon deflating to 9 mm across the ampulla.  Small stones were removed.  3.  A 2.5 trapezoid basket was used over the wire and lithotripsies were performed.  4.  A 9-12 millimeter biliary balloon was used and multiple sweeps were made with balloon deflated to 9 mm at the ampulla with removal of multiple stones.  5.  Biliary drainage was noted to be less than 50% and 5 minutes in prone position.  It was elected to place a biliary stent.  6.  An 8.5 Iraqi-7 cm biliary stent was placed without difficulty using short exchange system-Microvasive.  7.  Adequate biliary drainage was achieved.  8.  It was decided to terminate the procedure at this point.      Upper GI tract was decompressed and the scope was pulled out of the patient.  The patient tolerated the procedure well.     DIAGNOSIS:  Dilated common bile duct.  Common bile duct and right intrahepatic ductal stones.  Papillary stenosis.  Periampullary diverticulum.    COMMENTS:       RECOMMENDATIONS:  1.  Follow up the patient clinically.  2.  Follow up laboratory tests.  3.  Cholecystectomy as per Dr Reaves.       Thank you very much for letting me participate in the care of this patient. Please do not hesitate to call me if you have any questions.

## 2017-02-16 NOTE — ANESTHESIA POSTPROCEDURE EVALUATION
Patient: Lea Thompson    Procedure Summary     Date Anesthesia Start Anesthesia Stop Room / Location    02/16/17 0731   [facility] /  [facility]       Procedure Diagnosis Surgeon Provider    ENDOSCOPIC RETROGRADE CHOLANGIOPANCREATOGRAPHY WITH ENDOSCOPIC SPHINCTEROTOMY; LITHOTRIPSY; WIREGUIDED; BALLOON EXTRACTION (9MM/12MM); PLACEMENT OF 8.5 Faroese-7 CM BILIARY STENT; EXTRACTION OF STONE FROM RIGHT INTRAHEPATIC DUCT (N/A ) Choledocholithiasis; Obstructive jaundice  (Choledocholithiasis [K80.50]; Obstructive jaundice [K83.8]) MD Long Stanley CRNA          Anesthesia Type: general  Last vitals  BP      Temp      Pulse     Resp      SpO2        Post Anesthesia Care and Evaluation    Patient location during evaluation: PACU  Patient participation: complete - patient participated  Level of consciousness: awake  Pain score: 2  Pain management: adequate  Airway patency: patent  Anesthetic complications: No anesthetic complications  PONV Status: none  Cardiovascular status: acceptable  Respiratory status: acceptable  Hydration status: acceptable

## 2017-02-16 NOTE — ANESTHESIA PROCEDURE NOTES
Airway  Urgency: elective    Difficult airway    General Information and Staff    Patient location during procedure: OR  CRNA: DEEDEE RUSSELL    Indications and Patient Condition  Indications for airway management: airway protection (airway management)    Preoxygenated: yes  Mask difficulty assessment: 1 - vent by mask    Final Airway Details  Final airway type: endotracheal airway      Successful airway: ETT  Cuffed: yes (up with mov 5 ml)   Successful intubation technique: video laryngoscopy (small glide scope)  Facilitating devices/methods: intubating stylet  Endotracheal tube insertion site: oral  ETT size: 7.5 mm  Cormack-Lehane Classification: grade I - full view of glottis  Placement verified by: chest auscultation, capnometry and palpation of cuff   Measured from: lips  Number of attempts at approach: 1    Additional Comments  Intubated by dvnt, cuff up with mov, bbs and expansion =, + etco, taped at lips, tolerated induction and intubation without adverse rx.

## 2017-02-16 NOTE — PLAN OF CARE
Problem: Patient Care Overview (Adult)  Goal: Plan of Care Review  Outcome: Ongoing (interventions implemented as appropriate)    02/16/17 1514   Coping/Psychosocial Response Interventions   Plan Of Care Reviewed With patient   Patient Care Overview   Progress improving   Outcome Evaluation   Outcome Summary/Follow up Plan no pain or nausea post ercp         Problem: Cholecystitis/Cholecystectomy (Adult)  Goal: Signs and Symptoms of Listed Potential Problems Will be Absent or Manageable (Cholecystitis/Cholecystectomy)  Outcome: Ongoing (interventions implemented as appropriate)    Problem: Perioperative Period (Adult)  Goal: Signs and Symptoms of Listed Potential Problems Will be Absent or Manageable (Perioperative Period)  Outcome: Ongoing (interventions implemented as appropriate)

## 2017-02-16 NOTE — PROGRESS NOTES
[facility]    Name:  Lea Thompson   Age:  51 y.o.  Sex:  female  :  1965  MRN:  [id number]   Visit Number:  [id number]  Admission Date:  2/15/2017  Date Of Service:  17  Primary Care Physician:  No Known Provider     LOS: 1 day :  Patient Care Team:  No Known Provider as PCP - General:    History taken from:     patient    Chief Complaint:      Elevated liver enzymes    Subjective     Interval History:     Patient is a 51-year-old  female with a history of obstructive sleep apnea on CPAP who has had 6 months of right upper quadrant pain. It usually comes in the right upper quadrant and radiates to the right flank. She hasn't noticed anything that made it better or worse. He has had it on and off for months but is worse in the last couple weeks and especially the last 12 hours. She could not sleep last night. It's 8 out of 10 in intensity at present. She had vomiting ×4 within the past 12 hours. It again is radiating to the back. She took 2 Motrin which did not help. Laying down flat makes it worse as well. She last ate at 6:30 AM this morning. She's noticed like-colored stools. Denies any melanotic stools or hematochezia. Denies any hematemesis. He has not had this investigated before. She has had a colon resection in  for large colon polyps but has not had a colonoscopy since then. Her lipase was normal. CT abdomen and pelvis showed dilated common bile duct at 9 mm consistent with choledocholithiasis.  Ultrasound of the abdomen showed 12.6 mm dilation of the common bile duct.    Patient was taken to ERCP by Dr. Jaden hay with the following results:    INTERVENTION:     1. Using a 44 Rx sphincterotome over the wire a 9 mm biliary sphincterotomy was performed (Endocut-ERBE) without difficulty. The sphincterotome was then discontinued.  2. A 9-12 millimeter biliary balloon was used over the wire and selective cannulation of the right  intrahepatic ductal system was achieved. The right intrahepatic ductal stones was then removed. Sweep was made at 12 mm balloon deflating to 9 mm across the ampulla. Small stones were removed.  3. A 2.5 trapezoid basket was used over the wire and lithotripsies were performed.  4. A 9-12 millimeter biliary balloon was used and multiple sweeps were made with balloon deflated to 9 mm at the ampulla with removal of multiple stones.  5. Biliary drainage was noted to be less than 50% and 5 minutes in prone position. It was elected to place a biliary stent.  6. An 8.5 Icelandic-7 cm biliary stent was placed without difficulty using short exchange system-Microvasive.  7. Adequate biliary drainage was achieved.  8. It was decided to terminate the procedure at this point.      Upper GI tract was decompressed and the scope was pulled out of the patient. The patient tolerated the procedure well.      DIAGNOSIS:  Dilated common bile duct.  Common bile duct and right intrahepatic ductal stones.  Papillary stenosis.  Periampullary diverticulum.    Patient has some nausea post procedure.  Spoke to Dr. Reaves, he wants to take her gallbladder out as an outpatient and wants her respiratory status optimized.  We'll place her on an inhaler.  Denies any chest pressure, shortness breath, vomiting, or pain.  Advised her to cut down on smoking.    Review of Systems:     All systems were reviewed and negative except for:  Gastrointestinal: postitive for  nausea    Objective     Vital Signs:    Temp:  [97.6 °F (36.4 °C)-98.1 °F (36.7 °C)] 97.8 °F (36.6 °C)  Heart Rate:  [58-93] 64  Resp:  [14-20] 14  BP: (103-122)/(26-83) 103/26    Physical Exam:      General Appearance:    Alert, cooperative, in no acute distress   Head:    Normocephalic, without obvious abnormality, atraumatic   Eyes:            Lids and lashes normal, conjunctivae and sclerae normal, no   icterus, no pallor, corneas clear, PERRLA   Ears:    Ears appear intact with no  abnormalities noted   Throat:   No oral lesions, no thrush, oral mucosa moist   Neck:   No adenopathy, supple, trachea midline, no thyromegaly, no     carotid bruit, no JVD   Back:     No kyphosis present, no scoliosis present, no skin lesions,       erythema or scars, no tenderness to percussion or                   palpation,   range of motion normal   Lungs:     Clear to auscultation,respirations regular, even and                   unlabored    Heart:    Regular rhythm and normal rate, normal S1 and S2, no            murmur, no gallop, no rub, no click   Breast Exam:    Deferred   Abdomen:     Normal bowel sounds, no masses, no organomegaly, soft        non-tender, non-distended, no guarding, no rebound                 tenderness   Genitalia:    Deferred   Extremities:   Moves all extremities well, no edema, no cyanosis, no              redness   Pulses:   Pulses palpable and equal bilaterally   Skin:   No bleeding, bruising or rash   Lymph nodes:   No palpable adenopathy   Neurologic:   Cranial nerves 2 - 12 grossly intact, sensation intact, DTR        present and equal bilaterally        Results Review:      I reviewed the patient's new clinical results.    Labs:    Lab Results (last 24 hours)     Procedure Component Value Units Date/Time    Light Blue Top [[id number]] Collected:  02/15/17 0837    Specimen:  Blood Updated:  02/15/17 1301     Extra Tube hold for add-on       Auto resulted       Lavender Top [[id number]] Collected:  02/15/17 0837    Specimen:  Blood Updated:  02/15/17 1301     Extra Tube hold for add-on       Auto resulted       Gold Top - SST [[id number]] Collected:  02/15/17 0837    Specimen:  Blood Updated:  02/15/17 1301     Extra Tube Hold for add-ons.       Auto resulted.       Hemoglobin A1c [[id number]] Collected:  02/16/17 0632    Specimen:  Blood Updated:  02/16/17 0644    CBC (No Diff) [[id number]]  (Abnormal) Collected:  02/16/17 0632    Specimen:  Blood Updated:  02/16/17 0658     WBC 5.03  10*3/mm3      RBC 4.21 10*6/mm3      Hemoglobin 14.0 g/dL      Hematocrit 42.1 %      .0 (H) fL      MCH 33.3 (H) pg      MCHC 33.3 g/dL      RDW 12.5 %      RDW-SD 45.9 fl      MPV 11.0 fL      Platelets 169 10*3/mm3     Lipase [[id number]]  (Normal) Collected:  02/16/17 0632    Specimen:  Blood Updated:  02/16/17 0704     Lipase 64 U/L     Pregnancy, Urine [[id number]]  (Normal) Collected:  02/16/17 0700    Specimen:  Urine Updated:  02/16/17 0709     HCG, Urine QL Negative     Comprehensive Metabolic Panel [[id number]]  (Abnormal) Collected:  02/16/17 0632    Specimen:  Blood Updated:  02/16/17 0719     Glucose 111 (H) mg/dL      BUN 10 mg/dL      Creatinine 0.90 mg/dL      Sodium 143 mmol/L      Potassium 4.3 mmol/L      Chloride 109 (H) mmol/L      CO2 27.0 mmol/L      Calcium 8.8 mg/dL      Total Protein 6.7 g/dL      Albumin 3.80 g/dL      ALT (SGPT) 510 (C) U/L      AST (SGOT) 261 (H) U/L      Alkaline Phosphatase 106 U/L      Total Bilirubin 0.9 mg/dL      eGFR Non African Amer 67 mL/min/1.73      Globulin 2.9 gm/dL      A/G Ratio 1.3 g/dL      BUN/Creatinine Ratio 11.1      Anion Gap 11.3 mmol/L     Narrative:       Abnormal estimated GFR should be followed by more specific studies to confirm end stage chronic renal disease. The equation used for calculation may not be accurate for patients less than 19 years old, greater than 70 years old, patients at extremes of weight, malnutrition, or with acute renal dysfunction.    [place] Draw [[id number]] Collected:  02/15/17 0837    Specimen:  Blood Updated:  02/16/17 1054    Narrative:       The following orders were created for panel order [place] Draw.  Procedure                               Abnormality         Status                     ---------                               -----------         ------                     Light Blue Top[[id number]]                                    Final result               Lavender Top[[id number]]                                       Final result               Gold Top - SST[[id number]]                                    Final result               Green Top (No Gel)[[id number]]                                                             Please view results for these tests on the individual orders.           Radiology:    Imaging Results (last 24 hours)     Procedure Component Value Units Date/Time    US Gallbladder [[id number]] Collected:  02/15/17 1504     Updated:  02/15/17 1507    Narrative:       PROCEDURE: US GALLBLADDER-     HISTORY: right upper quadrant pain; K80.50-Calculus of bile duct without  cholangitis or cholecystitis without obstruction     PROCEDURE: Ultrasound images of the right upper quadrant were obtained.     FINDINGS: Limited images of the pancreas are unremarkable. The liver  parenchyma is echogenic consistent with fatty infiltration. Stones are  present within the gallbladder. There is no gallbladder wall thickening.   There is no pericholecystic fluid.  The common duct measures 12.6 mm       . Limited images of the right kidney are unremarkable.       Impression:       Gallstones with dilatation of the common bile duct measuring  12.6 mm.     This report was finalized on 2/15/2017 3:05 PM by Wiliam William M.D..    XR [facility] OR Procedure [[id number]] Collected:  02/16/17 1107     Updated:  02/16/17 1111    Narrative:       PROCEDURE: XR [facility] OR PROCEDURE-     HISTORY: ercp; K80.50-Calculus of bile duct without cholangitis or  cholecystitis without obstruction; K83.8-Other specified diseases of  biliary tract     PROCEDURE: An intraoperative cholangiogram was performed. Contrast was  injected by the operating physician. 6 spot images were obtained.     FINDINGS: There is dilatation of the common duct. There are filling  defects within the common duct and right hepatic duct consistent with  choledocholithiasis. No other filling defects are identified. No  significant intrahepatic biliary dilatation is evident. The  last image  demonstrates a biliary stent in place.       Impression:       Removal of the patient's choledocholithiasis with biliary  stent in place.     This report was finalized on 2/16/2017 11:08 AM by Wiliam William M.D..          Medication Review:       EPINEPHrine      iopamidol      pantoprazole 40 mg Intravenous Q AM         sodium chloride 125 mL/hr Last Rate: 125 mL/hr (02/15/17 7952)       Assessment/Plan     Problem List Items Addressed This Visit     Choledocholithiasis - Primary    Relevant Orders    Case Request (Completed)    ERCP    Obstructive jaundice    Relevant Orders    Case Request (Completed)    ERCP          1. Choledocholithiasis  2. Elevated liver enzymes  3. Obstructive sleep apnea on CPAP  4.  Extensive tobacco use with possible COPD    Plan:    Advised patient to quit smoking.  We'll place on Combivent.  Advance diet.  Monitor lab work.  Hopefully if lab work is improving by tomorrow patient could be discharged home.  Further recommendations will depend on the clinical course.    Robin Silvestre DO  02/16/17  11:47 AM

## 2017-02-16 NOTE — ANESTHESIA PREPROCEDURE EVALUATION
Anesthesia Evaluation     Patient summary reviewed and Nursing notes reviewed   no history of anesthetic complications:  NPO Status: > 8 hours   Airway   Mallampati: III  TM distance: >3 FB  Neck ROM: full  no difficulty expected and possible difficult intubation  Dental - normal exam     Pulmonary     breath sounds clear to auscultation  (+) a smoker Current, COPD mild, shortness of breath, sleep apnea on CPAP, decreased breath sounds,     ROS comment: Smokes 1.5 packs per day. Had quit for 8 years and started back 2 years ago  Cardiovascular - normal exam    ECG reviewed  Rhythm: regular  Rate: normal    (+) hypertension ( no meds) poorly controlled, DEJESUS,   PVD:  le edema at times.      Neuro/Psych- negative ROS  GI/Hepatic/Renal/Endo    (+) obesity, morbid obesity, GERD, diabetes mellitus ( no meds) type 2,     ROS Comment: occassional indegestion when eats mexican food    Musculoskeletal     (+) arthralgias, myalgias,   Abdominal   (+) obese,     Abdomen: tender.   Substance History   (+) alcohol use,   (-) drug use      Comment: Rare social glass of wine   OB/GYN negative ob/gyn ROS         Other - negative ROS       ROS/Med Hx Other: EKG was normal, sr  Pt non compliant with lifestyle changes                            Anesthesia Plan    ASA 2     general   (Risks and benefits discussed including risk of aspiration, recall and dental damage. All patient questions answered. Will continue with plan of care. Patient told that a breathing tube will be used to manage the airway.)  intravenous induction   Anesthetic plan and risks discussed with patient.

## 2017-02-16 NOTE — PROGRESS NOTES
LOS: 1 day   Patient Care Team:  No Known Provider as PCP - General        Subjective  Patient is currently sedated but awake    Interval History:     Patient Complaints: none    History taken from: chart    Vital Signs  Temp:  [97.6 °F (36.4 °C)-98.1 °F (36.7 °C)] 97.6 °F (36.4 °C)  Heart Rate:  [65-93] 93  Resp:  [16-20] 16  BP: (104-138)/(59-82) 113/82    Physical Exam:     General Appearance:    Alert, cooperative, in no acute distress   Head:    Normocephalic, without obvious abnormality, atraumatic   Abdomen:     Normal bowel sounds, no masses, no organomegaly, soft        non-tender, non-distended, no guarding, no rebound                tenderness   Extremities:   Moves all extremities well, no edema, no cyanosis, no             redness   Pulses:   Pulses palpable and equal bilaterally   Skin:   No bleeding, bruising or rash        Results Review:       Lab Results (last 24 hours)     Procedure Component Value Units Date/Time    CBC & Differential [[id number]] Collected:  02/15/17 0837    Specimen:  Blood Updated:  02/15/17 0850    Narrative:       The following orders were created for panel order CBC & Differential.  Procedure                               Abnormality         Status                     ---------                               -----------         ------                     CBC Auto Differential[[id number]]         Abnormal            Final result                 Please view results for these tests on the individual orders.    CBC Auto Differential [[id number]]  (Abnormal) Collected:  02/15/17 0837    Specimen:  Blood Updated:  02/15/17 0850     WBC 5.11 10*3/mm3      RBC 4.66 10*6/mm3      Hemoglobin 15.7 g/dL      Hematocrit 45.1 %      MCV 96.8 fL      MCH 33.7 (H) pg      MCHC 34.8 g/dL      RDW 12.0 %      RDW-SD 43.2 fl      MPV 10.7 fL      Platelets 174 10*3/mm3      Neutrophil % 56.4 %      Lymphocyte % 29.9 %      Monocyte % 9.0 %      Eosinophil % 3.3 %      Basophil % 0.6 %       Immature Grans % 0.8 (H) %      Neutrophils, Absolute 2.88 10*3/mm3      Lymphocytes, Absolute 1.53 10*3/mm3      Monocytes, Absolute 0.46 10*3/mm3      Eosinophils, Absolute 0.17 10*3/mm3      Basophils, Absolute 0.03 10*3/mm3      Immature Grans, Absolute 0.04 10*3/mm3      nRBC 0.0 /100 WBC     Lipase [[id number]]  (Normal) Collected:  02/15/17 0837    Specimen:  Blood Updated:  02/15/17 0858     Lipase 142 U/L     Comprehensive Metabolic Panel [[id number]]  (Abnormal) Collected:  02/15/17 0837    Specimen:  Blood Updated:  02/15/17 0907     Glucose 108 (H) mg/dL      BUN 13 mg/dL      Creatinine 0.80 mg/dL      Sodium 142 mmol/L      Potassium 4.7 mmol/L      Chloride 106 mmol/L      CO2 28.0 mmol/L      Calcium 10.2 mg/dL      Total Protein 7.2 g/dL      Albumin 4.10 g/dL      ALT (SGPT) 615 (C) U/L      AST (SGOT) 660 (C) U/L      Alkaline Phosphatase 109 U/L      Total Bilirubin 2.0 (H) mg/dL      eGFR Non African Amer 76 mL/min/1.73      Globulin 3.1 gm/dL      A/G Ratio 1.3 g/dL      BUN/Creatinine Ratio 16.3      Anion Gap 12.7 mmol/L     Narrative:       Abnormal estimated GFR should be followed by more specific studies to confirm end stage chronic renal disease. The equation used for calculation may not be accurate for patients less than 19 years old, greater than 70 years old, patients at extremes of weight, malnutrition, or with acute renal dysfunction.    Protime-INR [[id number]]  (Normal) Collected:  02/15/17 0837    Specimen:  Blood Updated:  02/15/17 1110     Protime 11.3 Seconds      INR 1.03     [place] Draw [[id number]] Collected:  02/15/17 0837    Specimen:  Blood Updated:  02/15/17 1301    Narrative:       The following orders were created for panel order [place] Draw.  Procedure                               Abnormality         Status                     ---------                               -----------         ------                     Light Blue Top[[id number]]                                     Final result               Lavender Top[[id number]]                                      Final result               Gold Top - SST[[id number]]                                    Final result               Green Top (No Gel)[[id number]]                                                             Please view results for these tests on the individual orders.    Light Blue Top [[id number]] Collected:  02/15/17 0837    Specimen:  Blood Updated:  02/15/17 1301     Extra Tube hold for add-on       Auto resulted       Lavender Top [[id number]] Collected:  02/15/17 0837    Specimen:  Blood Updated:  02/15/17 1301     Extra Tube hold for add-on       Auto resulted       Gold Top - SST [[id number]] Collected:  02/15/17 0837    Specimen:  Blood Updated:  02/15/17 1301     Extra Tube Hold for add-ons.       Auto resulted.       Hemoglobin A1c [[id number]] Collected:  02/16/17 0632    Specimen:  Blood Updated:  02/16/17 0644    CBC (No Diff) [[id number]]  (Abnormal) Collected:  02/16/17 0632    Specimen:  Blood Updated:  02/16/17 0658     WBC 5.03 10*3/mm3      RBC 4.21 10*6/mm3      Hemoglobin 14.0 g/dL      Hematocrit 42.1 %      .0 (H) fL      MCH 33.3 (H) pg      MCHC 33.3 g/dL      RDW 12.5 %      RDW-SD 45.9 fl      MPV 11.0 fL      Platelets 169 10*3/mm3     Lipase [[id number]]  (Normal) Collected:  02/16/17 0632    Specimen:  Blood Updated:  02/16/17 0704     Lipase 64 U/L     Pregnancy, Urine [[id number]]  (Normal) Collected:  02/16/17 0700    Specimen:  Urine Updated:  02/16/17 0709     HCG, Urine QL Negative     Comprehensive Metabolic Panel [[id number]]  (Abnormal) Collected:  02/16/17 0632    Specimen:  Blood Updated:  02/16/17 0719     Glucose 111 (H) mg/dL      BUN 10 mg/dL      Creatinine 0.90 mg/dL      Sodium 143 mmol/L      Potassium 4.3 mmol/L      Chloride 109 (H) mmol/L      CO2 27.0 mmol/L      Calcium 8.8 mg/dL      Total Protein 6.7 g/dL      Albumin 3.80 g/dL      ALT (SGPT) 510 (C) U/L      AST (SGOT) 261 (H) U/L       Alkaline Phosphatase 106 U/L      Total Bilirubin 0.9 mg/dL      eGFR Non African Amer 67 mL/min/1.73      Globulin 2.9 gm/dL      A/G Ratio 1.3 g/dL      BUN/Creatinine Ratio 11.1      Anion Gap 11.3 mmol/L     Narrative:       Abnormal estimated GFR should be followed by more specific studies to confirm end stage chronic renal disease. The equation used for calculation may not be accurate for patients less than 19 years old, greater than 70 years old, patients at extremes of weight, malnutrition, or with acute renal dysfunction.              Assessment/Plan     Active Problems:    Choledocholithiasis    Obstructive jaundice    Cholelithiasis with chronic cholecystitis    I did discuss the situation with Dr. Stanley, Dr. Silvestre, and the Anesthesia service.  She did have a successful ERCP with common bile duct stone removal this am, she will need future laparoscopic cholecystectomy electively.  She will need further pulmonary workup and hopefully she can decrease her tobacco usage for her upcoming surgery - I have asked the hospitalist service to place her on some inhalers at the time of discharge for further pulmonary optimization.      Benjamin Reaves MD  02/16/17  8:49 AM

## 2017-02-17 LAB
ALBUMIN SERPL-MCNC: 3.7 G/DL (ref 3.5–5)
ALBUMIN/GLOB SERPL: 1.3 G/DL (ref 1–2)
ALP SERPL-CCNC: 95 U/L (ref 38–126)
ALT SERPL W P-5'-P-CCNC: 351 U/L (ref 13–69)
ANION GAP SERPL CALCULATED.3IONS-SCNC: 12.2 MMOL/L
AST SERPL-CCNC: 81 U/L (ref 15–46)
BILIRUB SERPL-MCNC: 0.6 MG/DL (ref 0.2–1.3)
BUN BLD-MCNC: 11 MG/DL (ref 7–20)
BUN/CREAT SERPL: 13.8 (ref 7.1–23.5)
CALCIUM SPEC-SCNC: 8.8 MG/DL (ref 8.4–10.2)
CHLORIDE SERPL-SCNC: 110 MMOL/L (ref 98–107)
CO2 SERPL-SCNC: 25 MMOL/L (ref 26–30)
CREAT BLD-MCNC: 0.8 MG/DL (ref 0.6–1.3)
DEPRECATED RDW RBC AUTO: 44.5 FL (ref 37–54)
ERYTHROCYTE [DISTWIDTH] IN BLOOD BY AUTOMATED COUNT: 12.1 % (ref 11.5–14.5)
GFR SERPL CREATININE-BSD FRML MDRD: 76 ML/MIN/1.73
GLOBULIN UR ELPH-MCNC: 2.8 GM/DL
GLUCOSE BLD-MCNC: 108 MG/DL (ref 74–98)
HBA1C MFR BLD: 5 % (ref 3–6)
HCT VFR BLD AUTO: 40.7 % (ref 37–47)
HGB BLD-MCNC: 13.6 G/DL (ref 12–16)
LIPASE SERPL-CCNC: 3287 U/L (ref 23–300)
LIPASE SERPL-CCNC: 3886 U/L (ref 23–300)
MCH RBC QN AUTO: 33.3 PG (ref 27–31)
MCHC RBC AUTO-ENTMCNC: 33.4 G/DL (ref 30–37)
MCV RBC AUTO: 99.8 FL (ref 81–99)
PLATELET # BLD AUTO: 130 10*3/MM3 (ref 130–400)
PMV BLD AUTO: 11.7 FL (ref 6–12)
POTASSIUM BLD-SCNC: 4.2 MMOL/L (ref 3.5–5.1)
PROT SERPL-MCNC: 6.5 G/DL (ref 6.3–8.2)
RBC # BLD AUTO: 4.08 10*6/MM3 (ref 4.2–5.4)
SODIUM BLD-SCNC: 143 MMOL/L (ref 137–145)
WBC NRBC COR # BLD: 10.47 10*3/MM3 (ref 4.8–10.8)

## 2017-02-17 PROCEDURE — 25010000002 MORPHINE PER 10 MG: Performed by: INTERNAL MEDICINE

## 2017-02-17 PROCEDURE — 94640 AIRWAY INHALATION TREATMENT: CPT

## 2017-02-17 PROCEDURE — 25010000002 HYDROMORPHONE PER 4 MG: Performed by: INTERNAL MEDICINE

## 2017-02-17 PROCEDURE — 25010000002 ONDANSETRON PER 1 MG: Performed by: INTERNAL MEDICINE

## 2017-02-17 PROCEDURE — 85027 COMPLETE CBC AUTOMATED: CPT | Performed by: INTERNAL MEDICINE

## 2017-02-17 PROCEDURE — 99233 SBSQ HOSP IP/OBS HIGH 50: CPT | Performed by: INTERNAL MEDICINE

## 2017-02-17 PROCEDURE — 83690 ASSAY OF LIPASE: CPT | Performed by: INTERNAL MEDICINE

## 2017-02-17 PROCEDURE — 80053 COMPREHEN METABOLIC PANEL: CPT | Performed by: INTERNAL MEDICINE

## 2017-02-17 PROCEDURE — 93005 ELECTROCARDIOGRAM TRACING: CPT | Performed by: INTERNAL MEDICINE

## 2017-02-17 RX ADMIN — SODIUM CHLORIDE 125 ML/HR: 9 INJECTION, SOLUTION INTRAVENOUS at 09:00

## 2017-02-17 RX ADMIN — MORPHINE SULFATE 2 MG: 2 INJECTION, SOLUTION INTRAMUSCULAR; INTRAVENOUS at 08:56

## 2017-02-17 RX ADMIN — HYDROMORPHONE HYDROCHLORIDE 0.5 MG: 1 INJECTION, SOLUTION INTRAMUSCULAR; INTRAVENOUS; SUBCUTANEOUS at 19:33

## 2017-02-17 RX ADMIN — ONDANSETRON 4 MG: 2 INJECTION INTRAMUSCULAR; INTRAVENOUS at 16:50

## 2017-02-17 RX ADMIN — IPRATROPIUM BROMIDE 0.5 MG: 0.5 SOLUTION RESPIRATORY (INHALATION) at 19:30

## 2017-02-17 RX ADMIN — HYDROMORPHONE HYDROCHLORIDE 0.5 MG: 1 INJECTION, SOLUTION INTRAMUSCULAR; INTRAVENOUS; SUBCUTANEOUS at 16:49

## 2017-02-17 RX ADMIN — SODIUM CHLORIDE 125 ML/HR: 9 INJECTION, SOLUTION INTRAVENOUS at 16:50

## 2017-02-17 RX ADMIN — HYDROMORPHONE HYDROCHLORIDE 0.5 MG: 1 INJECTION, SOLUTION INTRAMUSCULAR; INTRAVENOUS; SUBCUTANEOUS at 11:22

## 2017-02-17 RX ADMIN — PANTOPRAZOLE SODIUM 40 MG: 40 INJECTION, POWDER, FOR SOLUTION INTRAVENOUS at 05:48

## 2017-02-17 NOTE — PLAN OF CARE
Problem: Patient Care Overview (Adult)  Goal: Plan of Care Review  Outcome: Ongoing (interventions implemented as appropriate)    Problem: Cholecystitis/Cholecystectomy (Adult)  Goal: Signs and Symptoms of Listed Potential Problems Will be Absent or Manageable (Cholecystitis/Cholecystectomy)  Outcome: Ongoing (interventions implemented as appropriate)

## 2017-02-17 NOTE — PROGRESS NOTES
"  SUBJECTIVE: The patient has been complaining of some epigastric abdominal pain.  Earlier this morning this was moderately severe.  The pain is sharp and radiates to the back through and through.  The pain is rather persistent.  However with pain medication she feels better.  There is no nausea or vomiting.  The patient has been passing flatus.  She did not have a bowel movement.  The patient denies reflux.  There is no dysphagia or odynophagia.      UNDERLYING CHRONIC ILLNESS: Smoking.     REVIEW OF SYSTEMS:    Constitutional: No fever or chills.  CNS: No seizure or stroke.  Cardiovascular: No chest pains, no palpitations.  Pulmonary: No shortness of breath.  No cough.  Liver: No jaundice.  Rheumatologic: No specific joint symptoms.  Skin: No rash.  Renal: The patient is making urine.  No hematuria or dysuria.  Psychiatric : Denies depression or anxiety.  Nutrition: Nothing by mouth.  Activities: The patient has walked to the bathroom.    OBJECTIVE:    Alert and oriented × 3.  No apparent distress.    Vital signs:    Blood pressure 128/71, pulse 55, temperature 98.6 °F (37 °C), temperature source Oral, resp. rate 14, height 64\" (162.6 cm), weight 292 lb 7 oz (133 kg), last menstrual period 05/01/2016, SpO2 99 %.    HEENT: Normal.  No jaundice.  No pallor.  Neck: Supple.  JVD.  No thyromegaly.  No lymph nodes.  Chest: Clear to auscultation.  Cardiovascular: S1 and S2 okay.  No S3.  No murmurs.  Abdomen: Inspection: Nondistended.  Palpation: Soft.  No mass  was palpable.  Tenderness: Mild tenderness in epigastrium.. Percussion: No clinical ascites.  Auscultation: Although somewhat decreased bowel sounds are present.  Extremities: No edema.  No cyanosis.  No clubbing.  Rectal: Deferred.  Stigmata of chronic liver disease: None.  Neurological: No focal neurological deficit.  Rheumatologic: No obvious joint swelling.    Laboratory tests:    Dated 2/17/2017 lipase 3287.  Sodium 143 potassium 4.2 chloride and intense CO2 " 25 BUN 11 creatinine 0.8 and glucose 108 calcium 8.8 albumen 3.7  AST 81 alkaline phosphatase 95 total bilirubin 0.6.    Dated 2/17/2017 white count 10.4 hemoglobin 13.6 hematocrit 40.7 and platelet count 130 K.    Assessment:    1.  Epigastric abdominal pain.  2.  Acute pancreatitis post-ERCP.  Appears to be mild.    3.  Common duct and right intrahepatic ductal stones.  Status post lithotripsy and removal.  4.  Obstructive jaundice secondary to CBD stones and papillary stenosis.  Status post ERCP, endoscopic sphincterotomy, lithotripsies and removal of stones.  The patient has a biliary stent.  Improved.  5.  Dilated common bile duct secondary to #4.  6.  Gallstones.  7.  Occasional reflux.  Stable.    Comorbid conditions: Overweight.  Obstructive sleep apnea.      Recommendations:    1.  Nothing by mouth.  2.  IV fluids.  3.  Pain control.  4.  If nausea may use Zofran.  5.  Acid suppressive therapy.  6.  The patient was counseled for smoking cessation (Smoking Cessation Counseling asymptomatic//3 minutes).  7.  Discussed with the patient and her family in detail.  Opportunity was given to ask questions.  8.  Follow-up the patient clinically.

## 2017-02-17 NOTE — PLAN OF CARE
Problem: Patient Care Overview (Adult)  Goal: Plan of Care Review  Outcome: Ongoing (interventions implemented as appropriate)    Problem: Cholecystitis/Cholecystectomy (Adult)  Goal: Signs and Symptoms of Listed Potential Problems Will be Absent or Manageable (Cholecystitis/Cholecystectomy)  Outcome: Ongoing (interventions implemented as appropriate)    Problem: Perioperative Period (Adult)  Goal: Signs and Symptoms of Listed Potential Problems Will be Absent or Manageable (Perioperative Period)  Outcome: Ongoing (interventions implemented as appropriate)

## 2017-02-17 NOTE — PROGRESS NOTES
[facility]    Name:  Lea Thompson   Age:  51 y.o.  Sex:  female  :  1965  MRN:  [id number]   Visit Number:  [id number]  Admission Date:  2/15/2017  Date Of Service:  17  Primary Care Physician:  No Known Provider     LOS: 2 days :  Patient Care Team:  No Known Provider as PCP - General:    History taken from:     patient    Chief Complaint:      Abdominal pain  Subjective     Interval History:     Patient is a 51-year-old  female with a history of obstructive sleep apnea on CPAP who has had 6 months of right upper quadrant pain. It usually comes in the right upper quadrant and radiates to the right flank. She hasn't noticed anything that made it better or worse. He has had it on and off for months but is worse in the last couple weeks and especially the last 12 hours. She could not sleep last night. It's 8 out of 10 in intensity at present. She had vomiting ×4 within the past 12 hours. It again is radiating to the back. She took 2 Motrin which did not help. Laying down flat makes it worse as well. She last ate at 6:30 AM this morning. She's noticed like-colored stools. Denies any melanotic stools or hematochezia. Denies any hematemesis. He has not had this investigated before. She has had a colon resection in  for large colon polyps but has not had a colonoscopy since then. Her lipase was normal. CT abdomen and pelvis showed dilated common bile duct at 9 mm consistent with choledocholithiasis.  Ultrasound of the abdomen showed 12.6 mm dilation of the common bile duct.     Patient was taken to ERCP by Dr. Stanley:  DIAGNOSIS:  Dilated common bile duct.  Common bile duct and right intrahepatic ductal stones.  Papillary stenosis.  Periampullary diverticulum.    Patient was doing well yesterday, however today she has abdominal pain in the bilateral upper quadrants radiating around to the back.  Lipase was ordered and it was significantly  "elevated.  She remains nothing by mouth, she is been given IV fluids.  We'll change her Dilaudid for pain.  She refused DuoNeb as she gets too \"antsy\" with albuterol, we will try Atrovent.    Review of Systems:     All systems were reviewed and negative except for:  Gastrointestinal: postitive for  pain    Objective     Vital Signs:    Temp:  [97.3 °F (36.3 °C)-97.9 °F (36.6 °C)] 97.3 °F (36.3 °C)  Heart Rate:  [59-62] 59  Resp:  [19-20] 19  BP: (115-130)/(61-62) 130/62    Physical Exam:      General Appearance:    Alert, cooperative, in no acute distress   Head:    Normocephalic, without obvious abnormality, atraumatic   Eyes:            Lids and lashes normal, conjunctivae and sclerae normal, no   icterus, no pallor, corneas clear, PERRLA   Ears:    Ears appear intact with no abnormalities noted   Throat:   No oral lesions, no thrush, oral mucosa moist   Neck:   No adenopathy, supple, trachea midline, no thyromegaly, no     carotid bruit, no JVD   Back:     No kyphosis present, no scoliosis present, no skin lesions,       erythema or scars, no tenderness to percussion or                   palpation,   range of motion normal   Lungs:     Clear to auscultation,respirations regular, even and                   unlabored    Heart:    Regular rhythm and normal rate, normal S1 and S2, no            murmur, no gallop, no rub, no click   Breast Exam:    Deferred   Abdomen:     Normal bowel sounds, no masses, no organomegaly, tender in epigastric area as well as right upper quadrant.  No guarding or rigidity.     Genitalia:    Deferred   Extremities:   Moves all extremities well, no edema, no cyanosis, no              redness   Pulses:   Pulses palpable and equal bilaterally   Skin:   No bleeding, bruising or rash   Lymph nodes:   No palpable adenopathy   Neurologic:   Cranial nerves 2 - 12 grossly intact, sensation intact, DTR        present and equal bilaterally        Results Review:      I reviewed the patient's new " clinical results.    Labs:    Lab Results (last 24 hours)     Procedure Component Value Units Date/Time    Hemoglobin A1c [[id number]]  (Normal) Collected:  02/16/17 0632    Specimen:  Blood Updated:  02/17/17 0203     Hemoglobin A1C 5.0 %     Narrative:       Expected HgbA1C results:     3% to 6% HgbA1C      HgbA1C                 Estimated Average Glucose     5%                              97 mg/dl   6%                             126 mg/dl   7%                             154 mg/dl   8%                             183 mg/dl   9%                             212 mg/dl  >10%                           >240 mg/dl      CBC (No Diff) [[id number]]  (Abnormal) Collected:  02/17/17 0533    Specimen:  Blood Updated:  02/17/17 0626     WBC 10.47 10*3/mm3      RBC 4.08 (L) 10*6/mm3      Hemoglobin 13.6 g/dL      Hematocrit 40.7 %      MCV 99.8 (H) fL      MCH 33.3 (H) pg      MCHC 33.4 g/dL      RDW 12.1 %      RDW-SD 44.5 fl      MPV 11.7 fL      Platelets 130 10*3/mm3     Comprehensive Metabolic Panel [[id number]]  (Abnormal) Collected:  02/17/17 0533    Specimen:  Blood Updated:  02/17/17 0655     Glucose 108 (H) mg/dL      BUN 11 mg/dL      Creatinine 0.80 mg/dL      Sodium 143 mmol/L      Potassium 4.2 mmol/L      Chloride 110 (H) mmol/L      CO2 25.0 (L) mmol/L      Calcium 8.8 mg/dL      Total Protein 6.5 g/dL      Albumin 3.70 g/dL      ALT (SGPT) 351 (C) U/L      AST (SGOT) 81 (H) U/L      Alkaline Phosphatase 95 U/L      Total Bilirubin 0.6 mg/dL      eGFR Non African Amer 76 mL/min/1.73      Globulin 2.8 gm/dL      A/G Ratio 1.3 g/dL      BUN/Creatinine Ratio 13.8      Anion Gap 12.2 mmol/L     Narrative:       Abnormal estimated GFR should be followed by more specific studies to confirm end stage chronic renal disease. The equation used for calculation may not be accurate for patients less than 19 years old, greater than 70 years old, patients at extremes of weight, malnutrition, or with acute renal dysfunction.     Lipase [[id number]]  (Abnormal) Collected:  02/17/17 0533    Specimen:  Blood Updated:  02/17/17 1115     Lipase 3287 (C) U/L     Lipase [[id number]]  (Abnormal) Collected:  02/17/17 1140    Specimen:  Blood Updated:  02/17/17 1220     Lipase 3886 (C) U/L            Radiology:    Imaging Results (last 24 hours)     ** No results found for the last 24 hours. **          Medication Review:       ipratropium 0.5 mg Nebulization 4x Daily - RT   pantoprazole 40 mg Intravenous Q AM         sodium chloride 125 mL/hr Last Rate: 125 mL/hr (02/17/17 0900)       Assessment/Plan     Problem List Items Addressed This Visit     Choledocholithiasis - Primary    Relevant Orders    Case Request (Completed)    ERCP (Completed)    Obstructive jaundice    Relevant Orders    Case Request (Completed)    ERCP (Completed)          1. Choledocholithiasis, status post ERCP  2. Elevated liver enzymes, improving  3. Obstructive sleep apnea on CPAP  4. Extensive tobacco use with likely COPD  5.  Post ERCP pancreatitis  6.  Morbid obesity    Plan:    Continue to monitor lab work.  Keep nothing by mouth.  Dilaudid for pain.  IV fluids.  Further recommendations will depend on clinical course.  Cholecystectomy as an outpatient.    Robin Silvestre DO  02/17/17  12:51 PM

## 2017-02-17 NOTE — PROGRESS NOTES
Continued Stay Note  HARMONY Garcia     Patient Name: Lea Thompson  MRN: [id number]  Today's Date: 2/17/2017    Admit Date: 2/15/2017          Discharge Plan       02/17/17 1536    Case Management/Social Work Plan    Plan Spoke to pt and  in room.  Denies discharge needs plans to return home.                Discharge Codes     None            Renetta Headley

## 2017-02-17 NOTE — PROGRESS NOTES
Patient: Lea Thompson  Procedure(s):  ENDOSCOPIC RETROGRADE CHOLANGIOPANCREATOGRAPHY WITH ENDOSCOPIC SPHINCTEROTOMY; LITHOTRIPSY; WIREGUIDED; BALLOON EXTRACTION (9MM/12MM); PLACEMENT OF 8.5 English-7 CM BILIARY STENT; EXTRACTION OF STONE FROM RIGHT INTRAHEPATIC DUCT  Anesthesia type: [unfilled]    Patient location: [facility] Surgical Floor  Last vitals:   Vitals:    02/17/17 0500   BP: 130/62   Pulse: 59   Resp: 19   Temp: 97.3 °F (36.3 °C)   SpO2: 98%     Level of consciousness: awake, alert and oriented    Post-anesthesia pain: adequate analgesia  Airway patency: patent  Respiratory: unassisted  Cardiovascular: stable and blood pressure at baseline  Hydration: euvolemic    Anesthetic complications: no

## 2017-02-18 VITALS
OXYGEN SATURATION: 96 % | HEART RATE: 81 BPM | WEIGHT: 292.9 LBS | SYSTOLIC BLOOD PRESSURE: 146 MMHG | RESPIRATION RATE: 18 BRPM | HEIGHT: 64 IN | DIASTOLIC BLOOD PRESSURE: 68 MMHG | BODY MASS INDEX: 50.01 KG/M2 | TEMPERATURE: 98.6 F

## 2017-02-18 LAB
ALBUMIN SERPL-MCNC: 3.7 G/DL (ref 3.5–5)
ALBUMIN/GLOB SERPL: 1.3 G/DL (ref 1–2)
ALP SERPL-CCNC: 92 U/L (ref 38–126)
ALT SERPL W P-5'-P-CCNC: 250 U/L (ref 13–69)
ANION GAP SERPL CALCULATED.3IONS-SCNC: 10.3 MMOL/L
AST SERPL-CCNC: 41 U/L (ref 15–46)
BILIRUB SERPL-MCNC: 0.6 MG/DL (ref 0.2–1.3)
BUN BLD-MCNC: 10 MG/DL (ref 7–20)
BUN/CREAT SERPL: 14.3 (ref 7.1–23.5)
CALCIUM SPEC-SCNC: 8.5 MG/DL (ref 8.4–10.2)
CHLORIDE SERPL-SCNC: 109 MMOL/L (ref 98–107)
CO2 SERPL-SCNC: 26 MMOL/L (ref 26–30)
CREAT BLD-MCNC: 0.7 MG/DL (ref 0.6–1.3)
DEPRECATED RDW RBC AUTO: 45.7 FL (ref 37–54)
ERYTHROCYTE [DISTWIDTH] IN BLOOD BY AUTOMATED COUNT: 12.3 % (ref 11.5–14.5)
GFR SERPL CREATININE-BSD FRML MDRD: 89 ML/MIN/1.73
GLOBULIN UR ELPH-MCNC: 2.8 GM/DL
GLUCOSE BLD-MCNC: 83 MG/DL (ref 74–98)
HCT VFR BLD AUTO: 39.3 % (ref 37–47)
HGB BLD-MCNC: 13.1 G/DL (ref 12–16)
LIPASE SERPL-CCNC: 566 U/L (ref 23–300)
MCH RBC QN AUTO: 33.4 PG (ref 27–31)
MCHC RBC AUTO-ENTMCNC: 33.3 G/DL (ref 30–37)
MCV RBC AUTO: 100.3 FL (ref 81–99)
PLATELET # BLD AUTO: 136 10*3/MM3 (ref 130–400)
PMV BLD AUTO: 11.1 FL (ref 6–12)
POTASSIUM BLD-SCNC: 4.3 MMOL/L (ref 3.5–5.1)
PROT SERPL-MCNC: 6.5 G/DL (ref 6.3–8.2)
RBC # BLD AUTO: 3.92 10*6/MM3 (ref 4.2–5.4)
SODIUM BLD-SCNC: 141 MMOL/L (ref 137–145)
WBC NRBC COR # BLD: 7.92 10*3/MM3 (ref 4.8–10.8)

## 2017-02-18 PROCEDURE — 85027 COMPLETE CBC AUTOMATED: CPT | Performed by: INTERNAL MEDICINE

## 2017-02-18 PROCEDURE — 80053 COMPREHEN METABOLIC PANEL: CPT | Performed by: INTERNAL MEDICINE

## 2017-02-18 PROCEDURE — 94660 CPAP INITIATION&MGMT: CPT

## 2017-02-18 PROCEDURE — 94640 AIRWAY INHALATION TREATMENT: CPT

## 2017-02-18 PROCEDURE — 83690 ASSAY OF LIPASE: CPT | Performed by: INTERNAL MEDICINE

## 2017-02-18 PROCEDURE — 94799 UNLISTED PULMONARY SVC/PX: CPT

## 2017-02-18 PROCEDURE — 99232 SBSQ HOSP IP/OBS MODERATE 35: CPT | Performed by: INTERNAL MEDICINE

## 2017-02-18 PROCEDURE — 99238 HOSP IP/OBS DSCHRG MGMT 30/<: CPT | Performed by: INTERNAL MEDICINE

## 2017-02-18 RX ORDER — ONDANSETRON 4 MG/1
4 TABLET, ORALLY DISINTEGRATING ORAL EVERY 8 HOURS PRN
Qty: 20 TABLET | Refills: 0 | Status: SHIPPED | OUTPATIENT
Start: 2017-02-18 | End: 2017-03-29

## 2017-02-18 RX ORDER — HYDROCODONE BITARTRATE AND ACETAMINOPHEN 5; 325 MG/1; MG/1
1 TABLET ORAL EVERY 6 HOURS PRN
Qty: 20 TABLET | Refills: 0 | Status: SHIPPED | OUTPATIENT
Start: 2017-02-18 | End: 2017-03-29

## 2017-02-18 RX ORDER — PANTOPRAZOLE SODIUM 40 MG/1
40 TABLET, DELAYED RELEASE ORAL DAILY
Qty: 30 TABLET | Refills: 0 | Status: SHIPPED | OUTPATIENT
Start: 2017-02-18 | End: 2017-10-30 | Stop reason: RX

## 2017-02-18 RX ORDER — HYDROCODONE BITARTRATE AND ACETAMINOPHEN 5; 325 MG/1; MG/1
1 TABLET ORAL EVERY 6 HOURS PRN
Qty: 20 TABLET | Refills: 0 | Status: SHIPPED | OUTPATIENT
Start: 2017-02-18 | End: 2017-02-18

## 2017-02-18 RX ADMIN — IPRATROPIUM BROMIDE 0.5 MG: 0.5 SOLUTION RESPIRATORY (INHALATION) at 07:27

## 2017-02-18 RX ADMIN — PANTOPRAZOLE SODIUM 40 MG: 40 INJECTION, POWDER, FOR SOLUTION INTRAVENOUS at 05:28

## 2017-02-18 RX ADMIN — IPRATROPIUM BROMIDE 0.5 MG: 0.5 SOLUTION RESPIRATORY (INHALATION) at 13:10

## 2017-02-18 RX ADMIN — SODIUM CHLORIDE 125 ML/HR: 9 INJECTION, SOLUTION INTRAVENOUS at 08:55

## 2017-02-18 NOTE — DISCHARGE SUMMARY
[facility]   DISCHARGE SUMMARY      Name:  Lea Thompson   Age:  51 y.o.  Sex:  female  :  1965  MRN:  [id number]   Visit Number:  [id number]  Primary Care Physician:  No Known Provider  Date of Discharge:  2017  Admission Date:  2/15/2017      Discharge Diagnosis:     1. Choledocholithiasis, status post ERCP  2. Elevated liver enzymes, improving  3. Obstructive sleep apnea on CPAP  4. Extensive tobacco use with likely COPD  5.  Post ERCP pancreatitis  6. Morbid obesity  Active Hospital Problems (** Indicates Principal Problem)    Diagnosis Date Noted   • Choledocholithiasis [K80.50] 02/15/2017   • Obstructive jaundice [K83.8] 02/15/2017      Resolved Hospital Problems    Diagnosis Date Noted Date Resolved   No resolved problems to display.         Presenting Problem/History of Present Illness:    Choledocholithiasis [K80.50]         Hospital Course:    Patient is a 51-year-old  female with a history of obstructive sleep apnea on CPAP who has had 6 months of right upper quadrant pain. It usually comes in the right upper quadrant and radiates to the right flank. She hasn't noticed anything that made it better or worse. He has had it on and off for months but is worse in the last couple weeks and especially the last 12 hours. She could not sleep last night. It's 8 out of 10 in intensity at present. She had vomiting ×4 within the past 12 hours. It again is radiating to the back. She took 2 Motrin which did not help. Laying down flat makes it worse as well. She last ate at 6:30 AM this morning. She's noticed like-colored stools. Denies any melanotic stools or hematochezia. Denies any hematemesis. He has not had this investigated before. She has had a colon resection in  for large colon polyps but has not had a colonoscopy since then. Her lipase was normal. CT abdomen and pelvis showed dilated common bile duct at 9 mm consistent with choledocholithiasis.  "Ultrasound of the abdomen showed 12.6 mm dilation of the common bile duct.      Patient was taken to ERCP by Dr. Stanley:  DIAGNOSIS:  Dilated common bile duct.  Common bile duct and right intrahepatic ductal stones.  Papillary stenosis.  Periampullary diverticulum.     Patient was having abdominal pain yesterday, she was noted to have pancreatitis. Lipase was ordered and it was significantly elevated. She was made nothing by mouth and given IV fluids.  She refused DuoNeb as she gets too \"antsy\" with albuterol, we will try Atrovent.  Today she feels much better.  Her abdominal pain is gone.  Her lipase has returned close to normal.  Her diet has been advanced.  She denies any chest pressure, nausea, shortness breath, vomiting, or pain.  Spoke to Dr. Reaves, he wants her lungs optimized before surgery.  Given her obesity, sleep apnea, and extensive tobacco abuse with likely COPD, we will refer her to Dr. Wiggins before surgery.  She states she will quit smoking.  Also advised to lose weight, which she will do.  She will be discharged home today.    Procedures Performed:    Procedure(s):  ENDOSCOPIC RETROGRADE CHOLANGIOPANCREATOGRAPHY WITH ENDOSCOPIC SPHINCTEROTOMY; LITHOTRIPSY; WIREGUIDED; BALLOON EXTRACTION (9MM/12MM); PLACEMENT OF 8.5 FRENCH-7 CM BILIARY STENT; EXTRACTION OF STONE FROM RIGHT INTRAHEPATIC DUCT       Consults:     Consults     Date and Time Order Name Status Description    2/15/2017 1434 Inpatient Consult to Gastroenterology      2/15/2017 1434 Surgery (on-call MD unless specified)            Pertinent Test Results:     Lab Results (all)     Procedure Component Value Units Date/Time    Urinalysis With / Culture If Indicated [[id number]]  (Abnormal) Collected:  02/15/17 0756    Specimen:  Urine from Urine, Clean Catch Updated:  02/15/17 0863     Color, UA Yellow      Appearance, UA Clear      pH, UA 6.0      Specific Gravity, UA 1.020      Glucose, UA Negative      Ketones, UA Negative      Bilirubin, UA " Small (1+) (A)      Blood, UA Trace (A)      Protein, UA Negative      Leuk Esterase, UA Negative      Nitrite, UA Negative      Urobilinogen, UA 0.2 E.U./dL     Urinalysis, Microscopic Only [[id number]]  (Abnormal) Collected:  02/15/17 0756    Specimen:  Urine from Urine, Clean Catch Updated:  02/15/17 0822     RBC, UA 3-5 (A) /HPF      WBC, UA 0-2 (A) /HPF      Bacteria, UA Trace (A) /HPF      Squamous Epithelial Cells, UA 7-12 (A) /HPF      Hyaline Casts, UA None Seen /LPF      Mucus, UA Trace /HPF      Methodology Manual Light Microscopy     CBC & Differential [[id number]] Collected:  02/15/17 0837    Specimen:  Blood Updated:  02/15/17 0850    Narrative:       The following orders were created for panel order CBC & Differential.  Procedure                               Abnormality         Status                     ---------                               -----------         ------                     CBC Auto Differential[[id number]]         Abnormal            Final result                 Please view results for these tests on the individual orders.    CBC Auto Differential [[id number]]  (Abnormal) Collected:  02/15/17 0837    Specimen:  Blood Updated:  02/15/17 0850     WBC 5.11 10*3/mm3      RBC 4.66 10*6/mm3      Hemoglobin 15.7 g/dL      Hematocrit 45.1 %      MCV 96.8 fL      MCH 33.7 (H) pg      MCHC 34.8 g/dL      RDW 12.0 %      RDW-SD 43.2 fl      MPV 10.7 fL      Platelets 174 10*3/mm3      Neutrophil % 56.4 %      Lymphocyte % 29.9 %      Monocyte % 9.0 %      Eosinophil % 3.3 %      Basophil % 0.6 %      Immature Grans % 0.8 (H) %      Neutrophils, Absolute 2.88 10*3/mm3      Lymphocytes, Absolute 1.53 10*3/mm3      Monocytes, Absolute 0.46 10*3/mm3      Eosinophils, Absolute 0.17 10*3/mm3      Basophils, Absolute 0.03 10*3/mm3      Immature Grans, Absolute 0.04 10*3/mm3      nRBC 0.0 /100 WBC     Lipase [[id number]]  (Normal) Collected:  02/15/17 0837    Specimen:  Blood Updated:  02/15/17 0858     Lipase  142 U/L     Comprehensive Metabolic Panel [[id number]]  (Abnormal) Collected:  02/15/17 0837    Specimen:  Blood Updated:  02/15/17 0907     Glucose 108 (H) mg/dL      BUN 13 mg/dL      Creatinine 0.80 mg/dL      Sodium 142 mmol/L      Potassium 4.7 mmol/L      Chloride 106 mmol/L      CO2 28.0 mmol/L      Calcium 10.2 mg/dL      Total Protein 7.2 g/dL      Albumin 4.10 g/dL      ALT (SGPT) 615 (C) U/L      AST (SGOT) 660 (C) U/L      Alkaline Phosphatase 109 U/L      Total Bilirubin 2.0 (H) mg/dL      eGFR Non African Amer 76 mL/min/1.73      Globulin 3.1 gm/dL      A/G Ratio 1.3 g/dL      BUN/Creatinine Ratio 16.3      Anion Gap 12.7 mmol/L     Narrative:       Abnormal estimated GFR should be followed by more specific studies to confirm end stage chronic renal disease. The equation used for calculation may not be accurate for patients less than 19 years old, greater than 70 years old, patients at extremes of weight, malnutrition, or with acute renal dysfunction.    Protime-INR [[id number]]  (Normal) Collected:  02/15/17 0837    Specimen:  Blood Updated:  02/15/17 1110     Protime 11.3 Seconds      INR 1.03     Light Blue Top [[id number]] Collected:  02/15/17 0837    Specimen:  Blood Updated:  02/15/17 1301     Extra Tube hold for add-on       Auto resulted       Lavender Top [[id number]] Collected:  02/15/17 0837    Specimen:  Blood Updated:  02/15/17 1301     Extra Tube hold for add-on       Auto resulted       Gold Top - SST [[id number]] Collected:  02/15/17 0837    Specimen:  Blood Updated:  02/15/17 1301     Extra Tube Hold for add-ons.       Auto resulted.       CBC (No Diff) [[id number]]  (Abnormal) Collected:  02/16/17 0632    Specimen:  Blood Updated:  02/16/17 0658     WBC 5.03 10*3/mm3      RBC 4.21 10*6/mm3      Hemoglobin 14.0 g/dL      Hematocrit 42.1 %      .0 (H) fL      MCH 33.3 (H) pg      MCHC 33.3 g/dL      RDW 12.5 %      RDW-SD 45.9 fl      MPV 11.0 fL      Platelets 169 10*3/mm3     Lipase  [[id number]]  (Normal) Collected:  02/16/17 0632    Specimen:  Blood Updated:  02/16/17 0704     Lipase 64 U/L     Pregnancy, Urine [[id number]]  (Normal) Collected:  02/16/17 0700    Specimen:  Urine Updated:  02/16/17 0709     HCG, Urine QL Negative     Comprehensive Metabolic Panel [[id number]]  (Abnormal) Collected:  02/16/17 0632    Specimen:  Blood Updated:  02/16/17 0719     Glucose 111 (H) mg/dL      BUN 10 mg/dL      Creatinine 0.90 mg/dL      Sodium 143 mmol/L      Potassium 4.3 mmol/L      Chloride 109 (H) mmol/L      CO2 27.0 mmol/L      Calcium 8.8 mg/dL      Total Protein 6.7 g/dL      Albumin 3.80 g/dL      ALT (SGPT) 510 (C) U/L      AST (SGOT) 261 (H) U/L      Alkaline Phosphatase 106 U/L      Total Bilirubin 0.9 mg/dL      eGFR Non African Amer 67 mL/min/1.73      Globulin 2.9 gm/dL      A/G Ratio 1.3 g/dL      BUN/Creatinine Ratio 11.1      Anion Gap 11.3 mmol/L     Narrative:       Abnormal estimated GFR should be followed by more specific studies to confirm end stage chronic renal disease. The equation used for calculation may not be accurate for patients less than 19 years old, greater than 70 years old, patients at extremes of weight, malnutrition, or with acute renal dysfunction.    [place] Draw [[id number]] Collected:  02/15/17 0837    Specimen:  Blood Updated:  02/16/17 1054    Narrative:       The following orders were created for panel order [place] Draw.  Procedure                               Abnormality         Status                     ---------                               -----------         ------                     Light Blue Top[[id number]]                                    Final result               Lavender Top[[id number]]                                      Final result               Gold Top - SST[[id number]]                                    Final result               Green Top (No Gel)[[id number]]                                                             Please view results for  these tests on the individual orders.    Hemoglobin A1c [[id number]]  (Normal) Collected:  02/16/17 0632    Specimen:  Blood Updated:  02/17/17 0203     Hemoglobin A1C 5.0 %     Narrative:       Expected HgbA1C results:     3% to 6% HgbA1C      HgbA1C                 Estimated Average Glucose     5%                              97 mg/dl   6%                             126 mg/dl   7%                             154 mg/dl   8%                             183 mg/dl   9%                             212 mg/dl  >10%                           >240 mg/dl      CBC (No Diff) [[id number]]  (Abnormal) Collected:  02/17/17 0533    Specimen:  Blood Updated:  02/17/17 0626     WBC 10.47 10*3/mm3      RBC 4.08 (L) 10*6/mm3      Hemoglobin 13.6 g/dL      Hematocrit 40.7 %      MCV 99.8 (H) fL      MCH 33.3 (H) pg      MCHC 33.4 g/dL      RDW 12.1 %      RDW-SD 44.5 fl      MPV 11.7 fL      Platelets 130 10*3/mm3     Comprehensive Metabolic Panel [[id number]]  (Abnormal) Collected:  02/17/17 0533    Specimen:  Blood Updated:  02/17/17 0655     Glucose 108 (H) mg/dL      BUN 11 mg/dL      Creatinine 0.80 mg/dL      Sodium 143 mmol/L      Potassium 4.2 mmol/L      Chloride 110 (H) mmol/L      CO2 25.0 (L) mmol/L      Calcium 8.8 mg/dL      Total Protein 6.5 g/dL      Albumin 3.70 g/dL      ALT (SGPT) 351 (C) U/L      AST (SGOT) 81 (H) U/L      Alkaline Phosphatase 95 U/L      Total Bilirubin 0.6 mg/dL      eGFR Non African Amer 76 mL/min/1.73      Globulin 2.8 gm/dL      A/G Ratio 1.3 g/dL      BUN/Creatinine Ratio 13.8      Anion Gap 12.2 mmol/L     Narrative:       Abnormal estimated GFR should be followed by more specific studies to confirm end stage chronic renal disease. The equation used for calculation may not be accurate for patients less than 19 years old, greater than 70 years old, patients at extremes of weight, malnutrition, or with acute renal dysfunction.    Lipase [[id number]]  (Abnormal) Collected:  02/17/17 0533    Specimen:   Blood Updated:  02/17/17 1115     Lipase 3287 (C) U/L     Lipase [[id number]]  (Abnormal) Collected:  02/17/17 1140    Specimen:  Blood Updated:  02/17/17 1220     Lipase 3886 (C) U/L     CBC (No Diff) [[id number]]  (Abnormal) Collected:  02/18/17 0818    Specimen:  Blood Updated:  02/18/17 0911     WBC 7.92 10*3/mm3      RBC 3.92 (L) 10*6/mm3      Hemoglobin 13.1 g/dL      Hematocrit 39.3 %      .3 (H) fL      MCH 33.4 (H) pg      MCHC 33.3 g/dL      RDW 12.3 %      RDW-SD 45.7 fl      MPV 11.1 fL      Platelets 136 10*3/mm3     Comprehensive Metabolic Panel [[id number]]  (Abnormal) Collected:  02/18/17 0818    Specimen:  Blood Updated:  02/18/17 0913     Glucose 83 mg/dL      BUN 10 mg/dL      Creatinine 0.70 mg/dL      Sodium 141 mmol/L      Potassium 4.3 mmol/L      Chloride 109 (H) mmol/L      CO2 26.0 mmol/L      Calcium 8.5 mg/dL      Total Protein 6.5 g/dL      Albumin 3.70 g/dL      ALT (SGPT) 250 (H) U/L      AST (SGOT) 41 U/L      Alkaline Phosphatase 92 U/L      Total Bilirubin 0.6 mg/dL      eGFR Non African Amer 89 mL/min/1.73      Globulin 2.8 gm/dL      A/G Ratio 1.3 g/dL      BUN/Creatinine Ratio 14.3      Anion Gap 10.3 mmol/L     Narrative:       Abnormal estimated GFR should be followed by more specific studies to confirm end stage chronic renal disease. The equation used for calculation may not be accurate for patients less than 19 years old, greater than 70 years old, patients at extremes of weight, malnutrition, or with acute renal dysfunction.    Lipase [[id number]]  (Abnormal) Collected:  02/18/17 0818    Specimen:  Blood Updated:  02/18/17 0913     Lipase 566 (H) U/L           Imaging Results (all)     Procedure Component Value Units Date/Time    CT Abdomen Pelvis With Contrast [[id number]] Collected:  02/15/17 0844     Updated:  02/15/17 0849    Narrative:       PROCEDURE: CT ABDOMEN PELVIS W CONTRAST-     HISTORY:  RUQ abd pain     COMPARISON:  None .     TECHNIQUE: Multiple axial CT images  were obtained from the lung bases  through the pubic symphysis following the administration of Isovue 300  and oral contrast.      FINDINGS:      ABDOMEN: The lung bases are clear. The heart is normal in size. The  liver is diffusely hypodense consistent with fatty infiltration. Stones  are present within the gallbladder. The common duct is dilated measuring  9 mm. There is an approximately 3 mm calcification in the region of the  distal common duct best appreciated on coronal image 44. The spleen is  unremarkable. No adrenal mass is present.  The pancreas is normal. The  kidneys are normal. The aorta is normal in caliber. There is no free  fluid or adenopathy. The abdominal portions of the GI tract are  unremarkable with no evidence of obstruction.     PELVIS: The appendix is normal. There are colonic diverticula without CT  evidence of diverticulitis. The urinary bladder is unremarkable. There  is no significant free fluid or adenopathy.           Impression:       1. Gallstones with dilatation of the common duct measuring 9 mm. There  is a calcification in the region of the distal common bile duct which  may reflect choledocholithiasis.  2. Colonic diverticula without CT evidence of diverticulitis.     3188.87 mGy.cm          This study was performed with techniques to keep radiation doses as low  as reasonably achievable (ALARA). Individualized dose reduction  techniques using automated exposure control or adjustment of mA and/or  kV according to the patient size were employed.            This report was finalized on 2/15/2017 8:47 AM by Wiliam William M.D..    US Gallbladder [[id number]] Collected:  02/15/17 1504     Updated:  02/15/17 1507    Narrative:       PROCEDURE: US GALLBLADDER-     HISTORY: right upper quadrant pain; K80.50-Calculus of bile duct without  cholangitis or cholecystitis without obstruction     PROCEDURE: Ultrasound images of the right upper quadrant were obtained.     FINDINGS: Limited  "images of the pancreas are unremarkable. The liver  parenchyma is echogenic consistent with fatty infiltration. Stones are  present within the gallbladder. There is no gallbladder wall thickening.   There is no pericholecystic fluid.  The common duct measures 12.6 mm       . Limited images of the right kidney are unremarkable.       Impression:       Gallstones with dilatation of the common bile duct measuring  12.6 mm.     This report was finalized on 2/15/2017 3:05 PM by Wiliam William M.D..    XR [facility] OR Procedure [[id number]] Collected:  02/16/17 1107     Updated:  02/16/17 1111    Narrative:       PROCEDURE: XR [facility] OR PROCEDURE-     HISTORY: ercp; K80.50-Calculus of bile duct without cholangitis or  cholecystitis without obstruction; K83.8-Other specified diseases of  biliary tract     PROCEDURE: An intraoperative cholangiogram was performed. Contrast was  injected by the operating physician. 6 spot images were obtained.     FINDINGS: There is dilatation of the common duct. There are filling  defects within the common duct and right hepatic duct consistent with  choledocholithiasis. No other filling defects are identified. No  significant intrahepatic biliary dilatation is evident. The last image  demonstrates a biliary stent in place.       Impression:       Removal of the patient's choledocholithiasis with biliary  stent in place.     This report was finalized on 2/16/2017 11:08 AM by Wiliam William M.D..          Condition on Discharge:      Stable    Vital Signs:    Visit Vitals   • /68 (BP Location: Left arm, Patient Position: Lying)   • Pulse 60   • Temp 98.6 °F (37 °C) (Oral)   • Resp 20   • Ht 64\" (162.6 cm)   • Wt 292 lb 14.4 oz (133 kg)   • LMP 05/01/2016   • SpO2 98%   • BMI 50.28 kg/m2       Physical Exam:      General Appearance:    Alert, cooperative, in no acute distress   Head:    Normocephalic, without obvious abnormality, atraumatic   Eyes:            Lids and lashes " normal, conjunctivae and sclerae normal, no   icterus, no pallor, corneas clear, PERRLA   Ears:    Ears appear intact with no abnormalities noted   Throat:   No oral lesions, no thrush, oral mucosa moist   Neck:   No adenopathy, supple, trachea midline, no thyromegaly, no     carotid bruit, no JVD   Back:     No kyphosis present, no scoliosis present, no skin lesions,       erythema or scars, no tenderness to percussion or                   palpation,   range of motion normal   Lungs:     Clear to auscultation,respirations regular, even and                   unlabored    Heart:    Regular rhythm and normal rate, normal S1 and S2, no            murmur, no gallop, no rub, no click   Breast Exam:    Deferred   Abdomen:     Normal bowel sounds, no masses, no organomegaly, soft        non-tender, non-distended, no guarding, no rebound                 tenderness   Genitalia:    Deferred   Extremities:   Moves all extremities well, no edema, no cyanosis, no              redness   Pulses:   Pulses palpable and equal bilaterally   Skin:   No bleeding, bruising or rash   Lymph nodes:   No palpable adenopathy   Neurologic:   Cranial nerves 2 - 12 grossly intact, sensation intact, DTR        present and equal bilaterally       Discharge Disposition:    Home or Self Care    Discharge Medication:     Lea Thompson   Home Medication Instructions VALENTINE:[id number]    Printed on:02/18/17 1301   Medication Information                      HYDROcodone-acetaminophen (NORCO) 5-325 MG per tablet  Take 1 tablet by mouth Every 6 (Six) Hours As Needed (20).             ondansetron ODT (ZOFRAN ODT) 4 MG disintegrating tablet  Take 1 tablet by mouth Every 8 (Eight) Hours As Needed for nausea or vomiting.             pantoprazole (PROTONIX) 40 MG EC tablet  Take 1 tablet by mouth Daily.                 Discharge Diet:      low-fat diet     Activity at Discharge:   As tolerated      Follow-up Appointments:     follow-up with Dr. Reaves in  one week, Dr. Wiggins in one week, Dr. Stanley in one week    Test Results Pending at Discharge:           Robin Silvestre DO  02/18/17  1:01 PM

## 2017-02-18 NOTE — PROGRESS NOTES
"SUBJECTIVE:  The patient has mild epigastric abdominal discomfort at times.  Frequency being couple of times at night.  The patient has not taken pain medication for the last 12 hours.  The patient feels hungry.  She did not have a bowel movement.  She has been passing flatus. There is no nausea or vomiting.  The patient denies reflux.  There is no dysphagia or odynophagia.     UNDERLYING CHRONIC ILLNESS: Smoking.      REVIEW OF SYSTEMS:     Constitutional: No fever or chills.  CNS: No seizure or stroke.  Cardiovascular: No chest pains, no palpitations.  Pulmonary: No shortness of breath. No cough.  Liver: No jaundice.  Rheumatologic: No specific joint symptoms.  Skin: No rash.  Renal: The patient is making urine. No hematuria or dysuria.  Psychiatric : Denies depression or anxiety.  Nutrition: Nothing by mouth.  Activities: The patient has taken shower and walked to the bathroom.    OBJECTIVE:     Alert and oriented × 3. No apparent distress.     Vital signs:   Blood pressure 131/73, pulse 66, temperature 98.3 °F (36.8 °C), temperature source Oral, resp. rate 18, height 64\" (162.6 cm), weight 292 lb 14.4 oz (133 kg), last menstrual period 05/01/2016, SpO2 97 %.     HEENT: Normal. No jaundice. No pallor. Oral mucosa somewhat dry.  No thrush.    Neck: Supple. JVD. No thyromegaly. No lymph nodes.  Chest: Clear to auscultation.  Cardiovascular: S1 and S2 okay. No S3. No murmurs.  Abdomen: Inspection: Nondistended. Palpation: Soft. No mass was palpable. Tenderness: No significant tenderness..  Percussion: No clinical ascites. Auscultation: Good bowel sounds.  Extremities: No edema. No cyanosis. No clubbing.  Rectal: Deferred.  Stigmata of chronic liver disease: None.  Neurological: No focal neurological deficit.  Rheumatologic: No obvious joint swelling.     Laboratory tests:     Dated 2/17/2017 lipase 3287. Sodium 143 potassium 4.2 chloride and intense CO2 25 BUN 11 creatinine 0.8 and glucose 108 calcium 8.8 albumen " 3.7  AST 81 alkaline phosphatase 95 total bilirubin 0.6.     Dated 2/17/2017 white count 10.4 hemoglobin 13.6 hematocrit 40.7 and platelet count 130 K.    Dated 2/18/2017 sodium 141 potassium 4.3 chloride 109 CO2 26 BUN 10 creatinine 0.7 and glucose 83.  Calcium 8.5  AST 41 alkaline phosphatase 90 2T bili 0.6.  Dated 2/18/2017 white count 7.92 hemoglobin 13.1 hematocrit 39.3 and a platelet count 136K.  lipase 566.    Assessment:     1. Epigastric abdominal pain.  Improved.  2. Acute pancreatitis post-ERCP;  Miild and clinically resolving.  3. Common duct and right intrahepatic ductal stones. Status post lithotripsy and removal.  4. Obstructive jaundice secondary to CBD stones and papillary stenosis. Status post ERCP, endoscopic sphincterotomy, lithotripsies and removal of stones. The patient has a biliary stent. Improved.  5. Dilated common bile duct secondary to #4.  6. Gallstones.  7. Occasional reflux. Stable.  8.  Mild thrombocytopenia.  Improved.     Comorbid conditions: Overweight. Obstructive sleep apnea.      Recommendations:     1.  May have clear liquid diet.  2.  If tolerated May advance to low-fat diet.  3. Pain control.  3.  If stable and tolerated the diet.  The patient may be able to go home this evening.  4. The patient was counseled for smoking cessation (Smoking Cessation Counseling asymptomatic//3 minutes).  5.  Avoid NSAIDs for 10 days.  Tylenol is okay.  6.  Watch for bleeding-black color stools (patient instructed).  Of interest that the patient is status post sphincterotomy.  7.  Discussed with the patient and her  in detail.  Opportunity was given to ask questions.

## 2017-02-18 NOTE — DISCHARGE INSTR - APPOINTMENTS
You will need to call to make your follow-up appointments as the doctors' offices are closed for the weekend.  Please call first thing Monday morning to do this.

## 2017-02-18 NOTE — PLAN OF CARE
Problem: Patient Care Overview (Adult)  Goal: Plan of Care Review  Outcome: Ongoing (interventions implemented as appropriate)  Goal: Discharge Needs Assessment  Outcome: Ongoing (interventions implemented as appropriate)    Problem: Cholecystitis/Cholecystectomy (Adult)  Goal: Signs and Symptoms of Listed Potential Problems Will be Absent or Manageable (Cholecystitis/Cholecystectomy)  Outcome: Ongoing (interventions implemented as appropriate)

## 2017-02-18 NOTE — PLAN OF CARE
Problem: Patient Care Overview (Adult)  Goal: Plan of Care Review  Outcome: Outcome(s) achieved Date Met:  02/18/17 02/18/17 5682   Coping/Psychosocial Response Interventions   Plan Of Care Reviewed With patient;spouse   Patient Care Overview   Progress improving   Outcome Evaluation   Outcome Summary/Follow up Plan Pt has had 2 bms today and tolerated PO low-fat diet well.         Problem: Cholecystitis/Cholecystectomy (Adult)  Goal: Signs and Symptoms of Listed Potential Problems Will be Absent or Manageable (Cholecystitis/Cholecystectomy)  Outcome: Outcome(s) achieved Date Met:  02/18/17    Problem: Perioperative Period (Adult)  Goal: Signs and Symptoms of Listed Potential Problems Will be Absent or Manageable (Perioperative Period)  Outcome: Outcome(s) achieved Date Met:  02/18/17

## 2017-02-18 NOTE — DISCHARGE INSTRUCTIONS
You will need to avoid NSAIDs (e.g. Advil, Naproxen, Aspirin) for 10 days. Tylenol is okay.     Watch for bleeding-black color stools.

## 2017-02-22 ENCOUNTER — OFFICE VISIT (OUTPATIENT)
Dept: SURGERY | Facility: CLINIC | Age: 52
End: 2017-02-22

## 2017-02-22 VITALS
HEIGHT: 64 IN | BODY MASS INDEX: 49.85 KG/M2 | SYSTOLIC BLOOD PRESSURE: 128 MMHG | DIASTOLIC BLOOD PRESSURE: 78 MMHG | WEIGHT: 292 LBS | TEMPERATURE: 98.2 F

## 2017-02-22 DIAGNOSIS — K80.20 CALCULUS OF GALLBLADDER WITHOUT CHOLECYSTITIS WITHOUT OBSTRUCTION: Primary | ICD-10-CM

## 2017-02-22 PROCEDURE — 99213 OFFICE O/P EST LOW 20 MIN: CPT | Performed by: SURGERY

## 2017-02-22 NOTE — PROGRESS NOTES
Reason for Consultation: Cholecystitis      Chief complaint   Chief Complaint   Patient presents with   • Cholelithiasis     abnomal gallbladder us/ ct scan showing gallstones       Subjective .     History of present illness:  I saw the patient in the office today as a consultation for evaluation abnormal gallbladder ultrasound showing gallstones in the CBD.  Ms. Thompson had an ERCP by Dr. Stanley last week. Patient complains of occasional attacks  nausea and  RUQ pain radiating to the upper back for about a month.  She denies diarrhea.  She did have a recent hospitalization after I saw the patient in the emergency room for evaluation and treatment of right upper quadrant and mid epigastric abdominal discomfort.  She did have extensive cholelithiasis and elevated liver function tests, she did have an ERCP performed by the GI service, common bile duct stones were removed.  She did have postoperative pancreatitis which has now resolved, she states she feels markedly better.    Review of Systems   Constitutional: Positive for appetite change. Negative for fatigue and fever.   HENT: Negative for congestion, nosebleeds and postnasal drip.    Eyes: Negative for photophobia.   Respiratory: Negative for cough, choking, chest tightness and shortness of breath.    Cardiovascular: Negative for chest pain, palpitations and leg swelling.   Gastrointestinal: Positive for abdominal pain (right upper quadrant / epigastric pain), nausea and vomiting.   Endocrine: Negative for cold intolerance and heat intolerance.   Genitourinary: Negative for flank pain and frequency.   Musculoskeletal: Negative for arthralgias.   Neurological: Negative for seizures, light-headedness, numbness and headaches.   Psychiatric/Behavioral: Negative for agitation, behavioral problems, confusion and hallucinations.       History  Past Medical History   Diagnosis Date   • Sleep apnea        Past Surgical History   Procedure Laterality Date   •   section       x2   • Polypectomy     • Colon surgery       MUCOSAL RESECTION   • Pr ercp dx collection specimen brushing/washing N/A 2/16/2017     Procedure: ENDOSCOPIC RETROGRADE CHOLANGIOPANCREATOGRAPHY WITH ENDOSCOPIC SPHINCTEROTOMY; LITHOTRIPSY; WIREGUIDED; BALLOON EXTRACTION (9MM/12MM); PLACEMENT OF 8.5 Salvadorean-7 CM BILIARY STENT; EXTRACTION OF STONE FROM RIGHT INTRAHEPATIC DUCT;  Surgeon: Chaitanya Stanley MD;  Location: [facility];  Service: Gastroenterology       Family History   Problem Relation Age of Onset   • Sarcoidosis Mother    • Cancer Father    • Heart disease Father        Social History   Substance Use Topics   • Smoking status: Current Every Day Smoker     Packs/day: 1.50     Types: Cigarettes   • Smokeless tobacco: None   • Alcohol use Yes      Comment: rarely drinks wine       Review of patient's allergies indicates no known allergies.    Objective     Vital Signs   Temp:  [98.2 °F (36.8 °C)] 98.2 °F (36.8 °C)  BP: (128)/(78) 128/78    Physical Exam:     General Appearance:    Alert, cooperative, in no acute distress   Head:    Normocephalic, without obvious abnormality, atraumatic   Eyes:            Lids and lashes normal, conjunctivae and sclerae normal, no   icterus, no pallor, corneas clear, PERRLA   Ears:    Ears appear intact with no abnormalities noted   Throat:   No oral lesions, no thrush, oral mucosa moist   Neck:   No adenopathy, supple, trachea midline, no thyromegaly, no   carotid bruit, no JVD   Back:     No kyphosis present, no scoliosis present, no skin lesions,      erythema or scars, no tenderness to percussion or                   palpation,   range of motion normal   Lungs:     Clear to auscultation,respirations regular, even and                  unlabored    Heart:    Regular rhythm and normal rate, normal S1 and S2, no            murmur   Abdomen:     no masses, no organomegaly, soft non-tender, non-distended, no guarding, there is evidence of right upper quadrant tenderness    Extremities:   Moves all extremities well, no edema, no cyanosis, no             redness   Pulses:   Pulses palpable and equal bilaterally   Skin:   No bleeding, bruising or rash   Lymph nodes:   No palpable adenopathy   Neurologic:   Cranial nerves 2 - 12 grossly intact, sensation intact        Results Review:   I reviewed the patient's new clinical results.  I reviewed the patient's new imaging results and agree with the interpretation.  I reviewed the patient's other test results and agree with the interpretation      Assessment/Plan :      I had a detailed and extensive discussion with the patient in the office and they understand that they need to undergo laparoscopic cholecystectomy with intraoperative cholangiography or possible open cholecystectomy.  First I would like to see the patient back in the office this coming Tuesday, Monday I will have repeat labs drawn and then we will review her results at that time.  I also have told the patient that she needs to quit smoking which she has, she previously was a pack-a-day smoker.      Benjamin Reaves MD  02/22/17  2:09 PM

## 2017-02-27 ENCOUNTER — LAB (OUTPATIENT)
Dept: LAB | Facility: HOSPITAL | Age: 52
End: 2017-02-27
Attending: SURGERY

## 2017-02-27 ENCOUNTER — OFFICE VISIT (OUTPATIENT)
Dept: GASTROENTEROLOGY | Facility: CLINIC | Age: 52
End: 2017-02-27

## 2017-02-27 ENCOUNTER — RESULTS ENCOUNTER (OUTPATIENT)
Dept: SURGERY | Facility: CLINIC | Age: 52
End: 2017-02-27

## 2017-02-27 VITALS
DIASTOLIC BLOOD PRESSURE: 78 MMHG | BODY MASS INDEX: 50.02 KG/M2 | TEMPERATURE: 99.1 F | SYSTOLIC BLOOD PRESSURE: 130 MMHG | WEIGHT: 293 LBS | HEIGHT: 64 IN | RESPIRATION RATE: 16 BRPM | HEART RATE: 77 BPM

## 2017-02-27 DIAGNOSIS — K80.50 CHOLEDOCHOLITHIASIS: Primary | ICD-10-CM

## 2017-02-27 DIAGNOSIS — K80.20 GALLSTONES: ICD-10-CM

## 2017-02-27 DIAGNOSIS — R12 HEARTBURN: ICD-10-CM

## 2017-02-27 DIAGNOSIS — K80.50 CALCULUS OF BILE DUCT WITHOUT MENTION OF CHOLECYSTITIS OR OBSTRUCTION: Primary | ICD-10-CM

## 2017-02-27 DIAGNOSIS — K85.10 ACUTE BILIARY PANCREATITIS WITHOUT INFECTION OR NECROSIS: ICD-10-CM

## 2017-02-27 DIAGNOSIS — K80.20 CALCULUS OF GALLBLADDER WITHOUT CHOLECYSTITIS WITHOUT OBSTRUCTION: ICD-10-CM

## 2017-02-27 DIAGNOSIS — K83.8 DILATED CBD, ACQUIRED: ICD-10-CM

## 2017-02-27 DIAGNOSIS — K83.1 OBSTRUCTIVE JAUNDICE: ICD-10-CM

## 2017-02-27 LAB
ALBUMIN SERPL-MCNC: 4 G/DL (ref 3.5–5)
ALBUMIN/GLOB SERPL: 1.3 G/DL (ref 1–2)
ALP SERPL-CCNC: 89 U/L (ref 38–126)
ALT SERPL W P-5'-P-CCNC: 62 U/L (ref 13–69)
ANION GAP SERPL CALCULATED.3IONS-SCNC: 15.2 MMOL/L
AST SERPL-CCNC: 32 U/L (ref 15–46)
BILIRUB SERPL-MCNC: 0.6 MG/DL (ref 0.2–1.3)
BUN BLD-MCNC: 11 MG/DL (ref 7–20)
BUN/CREAT SERPL: 11 (ref 7.1–23.5)
CALCIUM SPEC-SCNC: 9.8 MG/DL (ref 8.4–10.2)
CHLORIDE SERPL-SCNC: 109 MMOL/L (ref 98–107)
CO2 SERPL-SCNC: 23 MMOL/L (ref 26–30)
CREAT BLD-MCNC: 1 MG/DL (ref 0.6–1.3)
GFR SERPL CREATININE-BSD FRML MDRD: 59 ML/MIN/1.73
GLOBULIN UR ELPH-MCNC: 3.1 GM/DL
GLUCOSE BLD-MCNC: 129 MG/DL (ref 74–98)
LIPASE SERPL-CCNC: 360 U/L (ref 23–300)
POTASSIUM BLD-SCNC: 4.2 MMOL/L (ref 3.5–5.1)
PROT SERPL-MCNC: 7.1 G/DL (ref 6.3–8.2)
SODIUM BLD-SCNC: 143 MMOL/L (ref 137–145)

## 2017-02-27 PROCEDURE — 36415 COLL VENOUS BLD VENIPUNCTURE: CPT

## 2017-02-27 PROCEDURE — 99214 OFFICE O/P EST MOD 30 MIN: CPT | Performed by: INTERNAL MEDICINE

## 2017-02-27 PROCEDURE — 80053 COMPREHEN METABOLIC PANEL: CPT | Performed by: SURGERY

## 2017-02-27 PROCEDURE — 83690 ASSAY OF LIPASE: CPT | Performed by: SURGERY

## 2017-02-27 NOTE — PROGRESS NOTES
[address] [facility] [address]    (H) [phone]  (W)     Chief Complaint   Patient presents with   • Follow-up     History of Present Illness    The patient came back for follow visit today.  She feels okay.  The patient denies abdominal pain.  There is no nausea or vomiting.  Her reflux symptoms are under control.  She denies dysphagia or odynophagia.  There is no diarrhea or constipation.  The patient denies history of overt GI bleed (hematemesis, melena, or hematochezia).  There is no fever or chills.  There is no recent darkening of urine color, lightning of stool color or pruritus.  There is no recurrence of jaundice.      The patient had right upper quadrant and epigastric abdominal pain in early February 2017 associated with nausea vomiting and obstructive jaundice.  She underwent an ERCP which revealed common duct stone.  This required lithotripsy.  The patient has a biliary stent in place.  She has gallstones.  The patient had was procedure mild pancreatitis which was treated conservatively and clinically resolved.     Review of Systems   Constitutional: Negative for appetite change, chills, fatigue, fever and unexpected weight change.   HENT: Negative for mouth sores, nosebleeds and trouble swallowing.    Eyes: Negative for discharge and redness.   Respiratory: Positive for apnea. Negative for cough and shortness of breath.    Cardiovascular: Negative for chest pain, palpitations and leg swelling.   Gastrointestinal: Negative for abdominal distention, abdominal pain, anal bleeding, blood in stool, constipation, diarrhea, nausea and vomiting.   Endocrine: Negative for cold intolerance, heat intolerance and polydipsia.   Genitourinary: Negative for dysuria, hematuria and urgency.   Musculoskeletal: Negative for arthralgias, joint swelling and myalgias.   Skin: Negative for rash.   Allergic/Immunologic: Negative for food allergies and immunocompromised state.   Neurological: Negative for dizziness,  seizures, syncope and headaches.   Hematological: Negative for adenopathy. Bruises/bleeds easily.   Psychiatric/Behavioral: Negative for dysphoric mood. The patient is not nervous/anxious and is not hyperactive.      Patient Active Problem List   Diagnosis   • Choledocholithiasis   • Obstructive jaundice     Past Medical History   Diagnosis Date   • Cholelithiasis 2/15/2017   • Colon polyp    • Pancreatitis 2017     Acute after ercp   • Sleep apnea      Past Surgical History   Procedure Laterality Date   •  section       x2   • Polypectomy     • Colon surgery       MUCOSAL RESECTION   • Pr ercp dx collection specimen brushing/washing N/A 2017     Procedure: ENDOSCOPIC RETROGRADE CHOLANGIOPANCREATOGRAPHY WITH ENDOSCOPIC SPHINCTEROTOMY; LITHOTRIPSY; WIREGUIDED; BALLOON EXTRACTION (9MM/12MM); PLACEMENT OF 8.5 FRENCH-7 CM BILIARY STENT; EXTRACTION OF STONE FROM RIGHT INTRAHEPATIC DUCT;  Surgeon: Chaitanya Stanley MD;  Location: [facility];  Service: Gastroenterology   • Tubal abdominal ligation     • Colonoscopy     • Flexible sigmoidoscopy       Family History   Problem Relation Age of Onset   • Sarcoidosis Mother    • Colon polyps Mother    • Cancer Father    • Heart disease Father    • Pancreatitis Father      He is due to ercp     Social History   Substance Use Topics   • Smoking status: Current Every Day Smoker     Packs/day: 0.50     Years: 15.00     Types: Cigarettes   • Smokeless tobacco: Never Used      Comment: Have quit twice the first time for 8 yrs second time for two years resumed    • Alcohol use Yes     1 Glasses of wine per week      Comment: Very rarely maybe once or twice a year       Current Outpatient Prescriptions:   •  HYDROcodone-acetaminophen (NORCO) 5-325 MG per tablet, Take 1 tablet by mouth Every 6 (Six) Hours As Needed (pain)., Disp: 20 tablet, Rfl: 0  •  ondansetron ODT (ZOFRAN ODT) 4 MG disintegrating tablet, Take 1 tablet by mouth Every 8 (Eight) Hours As  "Needed for nausea or vomiting., Disp: 20 tablet, Rfl: 0  •  pantoprazole (PROTONIX) 40 MG EC tablet, Take 1 tablet by mouth Daily., Disp: 30 tablet, Rfl: 0  No Known Allergies  Visit Vitals   • /78   • Pulse 77   • Temp 99.1 °F (37.3 °C)   • Resp 16   • Ht 64\" (162.6 cm)   • Wt (!) 303 lb (137 kg)   • LMP 05/01/2016   • BMI 52.01 kg/m2     Physical Exam   Constitutional: She is oriented to person, place, and time. She appears well-developed and well-nourished. No distress.   HENT:   Head: Normocephalic and atraumatic.   Right Ear: Hearing and external ear normal.   Left Ear: Hearing and external ear normal.   Nose: Nose normal.   Mouth/Throat: Oropharynx is clear and moist and mucous membranes are normal. Mucous membranes are not pale, not dry and not cyanotic. No oral lesions. No oropharyngeal exudate.   Eyes: Conjunctivae and EOM are normal. Right eye exhibits no discharge. Left eye exhibits no discharge. No scleral icterus.   Neck: Trachea normal. Neck supple. No JVD present. No edema present. No thyroid mass and no thyromegaly present.   Cardiovascular: Normal rate, regular rhythm, S2 normal and normal heart sounds.  Exam reveals no gallop, no S3 and no friction rub.    No murmur heard.  Pulmonary/Chest: Effort normal and breath sounds normal. No respiratory distress. She has no wheezes. She has no rales. She exhibits no tenderness.   Abdominal: Soft. Normal appearance and bowel sounds are normal. She exhibits no distension, no ascites and no mass. There is no splenomegaly or hepatomegaly. There is no tenderness. There is no rigidity, no rebound and no guarding. No hernia.   Musculoskeletal: She exhibits no tenderness or deformity.       Vascular Status -  Her exam exhibits no right foot edema. Her exam exhibits no left foot edema.  Lymphadenopathy:     She has no cervical adenopathy.        Left: No supraclavicular adenopathy present.   Neurological: She is alert and oriented to person, place, and time. " She has normal strength. No cranial nerve deficit or sensory deficit. She exhibits normal muscle tone. Coordination normal.   Skin: No rash noted. She is not diaphoretic. No cyanosis. No pallor. Nails show no clubbing.   Psychiatric: She has a normal mood and affect. Her behavior is normal. Judgment and thought content normal.   Nursing note and vitals reviewed.      Laboratory Tests:    Upon review of medical records:    Dated February 15, 2017 sodium 142 potassium 4.7 chloride 106 CO2 28 BUN 13 serum creatinine 0.0 glucose 108.  Calcium 10.2.  Albumin 4.10.  T bili 2.0   alkaline phosphatase 109.  Lipase 142.  PT 11.3.  INR 1.03.  WBC 5.11 hemoglobin 15.7 hematocrit 45.1 MCV 96.8 and platelet count 174.    Dated February 17, 2017 (5:33 AM) sodium 143 potassium 4.2 chloride 110 CO2 25 BUN 11 serum creatinine 0.80 and glucose 108.  Calcium 8.8.  Albumin 3.70.  T bili 0.6 AST 81  alkaline phosphatase 95.  Lipase 3287.  WBC 10.47 hemoglobin 13.6 hematocrit 40.7 MCV 99.8 platelet count 130.    Dated February 17, 2017 lipase 3886.    Dated February 18, 2017 sodium 141 potassium 4.3 chloride 109 CO2 26 BUN 10 serum creatinine 0.70 and glucose 83.  Calcium 8.5.  Albumin 3.70.  T bili 0.6 AST 41  alkaline phosphatase 92.  Lipase 566.  WBC 7.92 hemoglobin 13.1 hematocrit 39.3 .3 platelet count 136.    Dated February 27, 2017 sodium 143 potassium 4.2 chloride 1079 CO2 23 BUN 11 creatinine 1.0 and glucose 129 calcium 9.8 total protein 7.1 albumen 4 ALT 62 AST 32 and alkaline phosphatase 89 lipase 360.      Abdominal Imaging:  Upon Review of medical records:    Dated February 15, 2017 the patient underwent a gallbladder ultrasound which revealed: Limited images of the pancreas are unremarkable.  The liver parenchyma is echogenic consistent with fatty infiltration.  Stones are present within the gallbladder.  No gallbladder wall thickening.  No pericholecystic fluid.  The common duct measures  12.6 mm.  Limited images of the right kidney are unremarkable.    Dated February 15, 2017 the patient underwent a CT of the abdomen and pelvis with oral and IV contrast which revealed: Clear lung bases.  Heart is normal in size.  Liver is diffusely hypodense consistent with fatty infiltration.  Stones are present within the gallbladder.  The common duct is dilated measuring 9 mm.  There is an approximately 3 mm calcification in the region of the distal common duct best appreciated on coronal images 44.  The spleen is unremarkable.  No adrenal mass present.  The pancreas is normal.  Kidneys are normal.  Aorta is normal in caliber.  No free fluid or adenopathy.  Abdominal portions of the GI tract are unremarkable with no evidence of obstruction.  The appendix is normal.  There are colonic diverticula without CT evidence of diverticulitis.  The urinary bladder is unremarkable.  There is no significant free fluid or adenopathy.    Procedures:  Upon review of medical records:    Dated February 16, 2017 the patient underwent ERCP which revealed: Dilated common bile duct.  Common bile duct and right intrahepatic ductal stones.  Papillary stenosis.  Periampullary diverticulum.  No biopsy.  Assessment and Plan:      Crystal was seen today for follow-up.    Diagnoses and all orders for this visit:    Choledocholithiasis  Comments:  Improved.  Status post lithotripsy and removal of stones.    Obstructive jaundice  Comments:  Secondary to choledocholithiasis.  Improved.  Currently the patient has a biliary stent in place.    Dilated cbd, acquired  Comments:  Secondary to choledocholithiasis.    Acute biliary pancreatitis without infection or necrosis  Comments:  Postprocedure.  Mild.  Clinically resolved.    Heartburn  Comments:  Improved.    Gallstones  Comments:  Stable.          Discussion:       Plan     Patient Instructions     1. Anti-antireflux measures.  2. Low-fat diet.   3. Laparoscopic cholecystectomy as per   Jelly.  4. Post cholecystectomy,  once the patient is healed,  the stent will be removed.  5. It is recommended that the patient should undergo a colonoscopy in the future in view of history of a large lesion of the colon removed several years ago.  6. The patient should avoid lifting heavy weights (greater than 20 pounds), excessive bending or jumping in view of biliary stent.  7. Discussed with the patient in detail.  Opportunity was given to ask questions.      Patient Care Team:  No Known Provider as PCP - General    Chaitanya Stanley MD

## 2017-02-27 NOTE — PATIENT INSTRUCTIONS
1. Anti-antireflux measures.  2. Low-fat diet.   3. Laparoscopic cholecystectomy as per Dr. Reaves.  4. Post cholecystectomy,  once the patient is healed,  the stent will be removed.  5. It is recommended that the patient should undergo a colonoscopy in the future in view of history of a large lesion of the colon removed several years ago.  6. The patient should avoid lifting heavy weights (greater than 20 pounds), excessive bending or jumping in view of biliary stent.  7. Discussed with the patient in detail.  Opportunity was given to ask questions.

## 2017-02-28 ENCOUNTER — OFFICE VISIT (OUTPATIENT)
Dept: SURGERY | Facility: CLINIC | Age: 52
End: 2017-02-28

## 2017-02-28 VITALS
BODY MASS INDEX: 50.02 KG/M2 | SYSTOLIC BLOOD PRESSURE: 130 MMHG | WEIGHT: 293 LBS | HEIGHT: 64 IN | DIASTOLIC BLOOD PRESSURE: 78 MMHG | HEART RATE: 68 BPM | TEMPERATURE: 98.2 F

## 2017-02-28 DIAGNOSIS — K80.20 CALCULUS OF GALLBLADDER WITHOUT CHOLECYSTITIS WITHOUT OBSTRUCTION: Primary | ICD-10-CM

## 2017-02-28 PROCEDURE — 99213 OFFICE O/P EST LOW 20 MIN: CPT | Performed by: SURGERY

## 2017-02-28 RX ORDER — PANTOPRAZOLE SODIUM 40 MG/10ML
40 INJECTION, POWDER, LYOPHILIZED, FOR SOLUTION INTRAVENOUS ONCE
Status: CANCELLED | OUTPATIENT
Start: 2017-02-28 | End: 2017-02-28

## 2017-02-28 RX ORDER — SODIUM CHLORIDE 9 MG/ML
100 INJECTION, SOLUTION INTRAVENOUS CONTINUOUS
Status: CANCELLED | OUTPATIENT
Start: 2017-02-28

## 2017-02-28 NOTE — PROGRESS NOTES
Reason for Consultation:     Chief complaint   Chief Complaint   Patient presents with   • Follow-up     f/u labs       Subjective .     History of present illness:  I saw the patient in the office to discuss a Lap Juliette and follow up labs.  Patient has gallstones and a hx of pancreatitis. She complains of epigastric pain.  She does have a history significant for cholelithiasis, she has had a previous admission for chronic cholecystitis with acute exacerbation and choledocholithiasis.  She has been treated with recent ERCP and stent placement, she did have postoperative pancreatitis requiring several day hospitalization.  She has quit smoking and feels markedly better.    Review of Systems   Constitutional: Positive for appetite change. Negative for fatigue and fever.   HENT: Negative for congestion, nosebleeds and postnasal drip.    Eyes: Negative for photophobia.   Respiratory: Negative for cough, choking, chest tightness and shortness of breath.    Cardiovascular: Negative for chest pain, palpitations and leg swelling.   Gastrointestinal: Positive for abdominal pain (right upper quadrant / epigastric pain) and nausea.   Endocrine: Negative for cold intolerance and heat intolerance.   Genitourinary: Negative for flank pain and frequency.   Musculoskeletal: Negative for arthralgias.   Neurological: Negative for seizures, light-headedness, numbness and headaches.   Psychiatric/Behavioral: Negative for agitation, behavioral problems, confusion and hallucinations.       History  Past Medical History   Diagnosis Date   • Cholelithiasis 2/15/2017   • Colon polyp    • Pancreatitis 2017     Acute after ercp   • Sleep apnea        Past Surgical History   Procedure Laterality Date   •  section       x2   • Polypectomy     • Colon surgery       MUCOSAL RESECTION   • Pr ercp dx collection specimen brushing/washing N/A 2017     Procedure: ENDOSCOPIC RETROGRADE CHOLANGIOPANCREATOGRAPHY WITH ENDOSCOPIC  SPHINCTEROTOMY; LITHOTRIPSY; WIREGUIDED; BALLOON EXTRACTION (9MM/12MM); PLACEMENT OF 8.5 Nepalese-7 CM BILIARY STENT; EXTRACTION OF STONE FROM RIGHT INTRAHEPATIC DUCT;  Surgeon: Chaitanya Stanley MD;  Location: [facility];  Service: Gastroenterology   • Tubal abdominal ligation  1990   • Colonoscopy  2003   • Flexible sigmoidoscopy  2003       Family History   Problem Relation Age of Onset   • Sarcoidosis Mother    • Colon polyps Mother    • Cancer Father    • Heart disease Father    • Pancreatitis Father      He is due to ercp       Social History   Substance Use Topics   • Smoking status: Current Every Day Smoker     Packs/day: 0.50     Years: 15.00     Types: Cigarettes   • Smokeless tobacco: Never Used      Comment: Have quit twice the first time for 8 yrs second time for two years resumed 2015   • Alcohol use Yes     1 Glasses of wine per week      Comment: Very rarely maybe once or twice a year       Review of patient's allergies indicates no known allergies.    Objective     Vital Signs   Temp:  [98.2 °F (36.8 °C)-99.1 °F (37.3 °C)] 98.2 °F (36.8 °C)  Heart Rate:  [68-77] 68  Resp:  [16] 16  BP: (130)/(78) 130/78    Physical Exam:     General Appearance:    Alert, cooperative, in no acute distress   Head:    Normocephalic, without obvious abnormality, atraumatic   Eyes:            Lids and lashes normal, conjunctivae and sclerae normal, no   icterus, no pallor, corneas clear, PERRLA   Ears:    Ears appear intact with no abnormalities noted   Throat:   No oral lesions, no thrush, oral mucosa moist   Neck:   No adenopathy, supple, trachea midline, no thyromegaly, no   carotid bruit, no JVD   Back:     No kyphosis present, no scoliosis present, no skin lesions,      erythema or scars, no tenderness to percussion or                   palpation,   range of motion normal   Lungs:     Clear to auscultation,respirations regular, even and                  unlabored    Heart:    Regular rhythm and normal rate, normal S1 and  S2, no            murmur   Abdomen:     no masses, no organomegaly, soft non-tender, non-distended, no guarding, there is evidence of right upper quadrant tenderness   Extremities:   Moves all extremities well, no edema, no cyanosis, no             redness   Pulses:   Pulses palpable and equal bilaterally   Skin:   No bleeding, bruising or rash   Lymph nodes:   No palpable adenopathy   Neurologic:   Cranial nerves 2 - 12 grossly intact, sensation intact        Results Review:   I reviewed the patient's new clinical results.  I reviewed the patient's new imaging results and agree with the interpretation.  I reviewed the patient's other test results and agree with the interpretation      Assessment/Plan :    I had a detailed and extensive discussion with the patient in the office and they understand that they need to undergo laparoscopic cholecystectomy with intraoperative cholangiography or possible open cholecystectomy. Full risks and benefits of operative versus nonoperative intervention were discussed with the patient and these included things such as nonresolution of symptoms and possible worsening of symptoms without surgical intervention versus infection, bleeding, open cholecystectomy, common bile duct injury, postoperative biliary leakage, need for drain placement, possible inability to perform cholangiography due to inflammation, postoperative abscess, etc with surgical intervention. The patient understands, agrees, and wishes to proceed with the surgical treatment plan as mentioned above. The patient had no questions for me at the end of the discussion.  I did draw a picture of the anatomy for the patient and used this in my informed consent.    I discussed the patients findings and my recommendations with patient.        Benjamin Reaves MD  02/28/17  2:08 PM

## 2017-03-08 ENCOUNTER — HOSPITAL ENCOUNTER (OUTPATIENT)
Dept: GENERAL RADIOLOGY | Facility: HOSPITAL | Age: 52
Discharge: HOME | End: 2017-03-08
Admitting: SURGERY

## 2017-03-08 ENCOUNTER — APPOINTMENT (OUTPATIENT)
Dept: PREADMISSION TESTING | Facility: HOSPITAL | Age: 52
End: 2017-03-08

## 2017-03-08 VITALS
BODY MASS INDEX: 48.82 KG/M2 | WEIGHT: 293 LBS | HEIGHT: 65 IN | DIASTOLIC BLOOD PRESSURE: 72 MMHG | SYSTOLIC BLOOD PRESSURE: 125 MMHG

## 2017-03-08 DIAGNOSIS — K80.20 CALCULUS OF GALLBLADDER WITHOUT CHOLECYSTITIS WITHOUT OBSTRUCTION: ICD-10-CM

## 2017-03-08 LAB
ANION GAP SERPL CALCULATED.3IONS-SCNC: 14.2 MMOL/L
B-HCG UR QL: NEGATIVE
BUN BLD-MCNC: 9 MG/DL (ref 7–20)
BUN/CREAT SERPL: 9 (ref 7.1–23.5)
CALCIUM SPEC-SCNC: 9.8 MG/DL (ref 8.4–10.2)
CHLORIDE SERPL-SCNC: 107 MMOL/L (ref 98–107)
CO2 SERPL-SCNC: 27 MMOL/L (ref 26–30)
CREAT BLD-MCNC: 1 MG/DL (ref 0.6–1.3)
DEPRECATED RDW RBC AUTO: 41.2 FL (ref 37–54)
ERYTHROCYTE [DISTWIDTH] IN BLOOD BY AUTOMATED COUNT: 11.8 % (ref 11.5–14.5)
GFR SERPL CREATININE-BSD FRML MDRD: 59 ML/MIN/1.73
GLUCOSE BLD-MCNC: 97 MG/DL (ref 74–98)
HCT VFR BLD AUTO: 48.6 % (ref 37–47)
HGB BLD-MCNC: 16.6 G/DL (ref 12–16)
MCH RBC QN AUTO: 32.8 PG (ref 27–31)
MCHC RBC AUTO-ENTMCNC: 34.2 G/DL (ref 30–37)
MCV RBC AUTO: 96 FL (ref 81–99)
PLATELET # BLD AUTO: 165 10*3/MM3 (ref 130–400)
PMV BLD AUTO: 12.2 FL (ref 6–12)
POTASSIUM BLD-SCNC: 4.2 MMOL/L (ref 3.5–5.1)
RBC # BLD AUTO: 5.06 10*6/MM3 (ref 4.2–5.4)
SODIUM BLD-SCNC: 144 MMOL/L (ref 137–145)
WBC NRBC COR # BLD: 5.78 10*3/MM3 (ref 4.8–10.8)

## 2017-03-08 PROCEDURE — 93005 ELECTROCARDIOGRAM TRACING: CPT | Performed by: SURGERY

## 2017-03-08 PROCEDURE — 36415 COLL VENOUS BLD VENIPUNCTURE: CPT

## 2017-03-08 PROCEDURE — 71010 HC CHEST PA OR AP: CPT

## 2017-03-08 PROCEDURE — 81025 URINE PREGNANCY TEST: CPT | Performed by: SURGERY

## 2017-03-08 PROCEDURE — 80048 BASIC METABOLIC PNL TOTAL CA: CPT | Performed by: SURGERY

## 2017-03-08 PROCEDURE — 85027 COMPLETE CBC AUTOMATED: CPT | Performed by: SURGERY

## 2017-03-15 ENCOUNTER — ANESTHESIA EVENT (OUTPATIENT)
Dept: PERIOP | Facility: HOSPITAL | Age: 52
End: 2017-03-15

## 2017-03-15 ENCOUNTER — ANESTHESIA (OUTPATIENT)
Dept: PERIOP | Facility: HOSPITAL | Age: 52
End: 2017-03-15

## 2017-03-15 ENCOUNTER — APPOINTMENT (OUTPATIENT)
Dept: GENERAL RADIOLOGY | Facility: HOSPITAL | Age: 52
End: 2017-03-15

## 2017-03-15 ENCOUNTER — HOSPITAL ENCOUNTER (OUTPATIENT)
Facility: HOSPITAL | Age: 52
Setting detail: HOSPITAL OUTPATIENT SURGERY
Discharge: HOME | End: 2017-03-15
Attending: SURGERY | Admitting: SURGERY

## 2017-03-15 VITALS
OXYGEN SATURATION: 97 % | DIASTOLIC BLOOD PRESSURE: 74 MMHG | HEART RATE: 68 BPM | SYSTOLIC BLOOD PRESSURE: 145 MMHG | RESPIRATION RATE: 18 BRPM | TEMPERATURE: 97 F

## 2017-03-15 DIAGNOSIS — K80.20 CALCULUS OF GALLBLADDER WITHOUT CHOLECYSTITIS WITHOUT OBSTRUCTION: ICD-10-CM

## 2017-03-15 DIAGNOSIS — K82.9 GALL BLADDER DISEASE: ICD-10-CM

## 2017-03-15 LAB — B-HCG UR QL: NEGATIVE

## 2017-03-15 PROCEDURE — 25010000002 PROPOFOL 200 MG/20ML EMULSION: Performed by: NURSE ANESTHETIST, CERTIFIED REGISTERED

## 2017-03-15 PROCEDURE — 25010000002 MIDAZOLAM PER 1 MG: Performed by: NURSE ANESTHETIST, CERTIFIED REGISTERED

## 2017-03-15 PROCEDURE — 74300 X-RAY BILE DUCTS/PANCREAS: CPT

## 2017-03-15 PROCEDURE — 25010000002 HYDROMORPHONE PER 4 MG

## 2017-03-15 PROCEDURE — 25010000002 ONDANSETRON PER 1 MG: Performed by: NURSE ANESTHETIST, CERTIFIED REGISTERED

## 2017-03-15 PROCEDURE — 47563 LAPARO CHOLECYSTECTOMY/GRAPH: CPT | Performed by: SURGERY

## 2017-03-15 PROCEDURE — 25010000002 HYDROMORPHONE PER 4 MG: Performed by: NURSE ANESTHETIST, CERTIFIED REGISTERED

## 2017-03-15 PROCEDURE — 25010000003 AMPICILLIN-SULBACTAM PER 1.5 G: Performed by: SURGERY

## 2017-03-15 PROCEDURE — 25010000002 DEXAMETHASONE PER 1 MG: Performed by: NURSE ANESTHETIST, CERTIFIED REGISTERED

## 2017-03-15 PROCEDURE — 25010000002 FENTANYL CITRATE (PF) 100 MCG/2ML SOLUTION: Performed by: NURSE ANESTHETIST, CERTIFIED REGISTERED

## 2017-03-15 PROCEDURE — 25010000002 SUCCINYLCHOLINE PER 20 MG: Performed by: NURSE ANESTHETIST, CERTIFIED REGISTERED

## 2017-03-15 PROCEDURE — 81025 URINE PREGNANCY TEST: CPT | Performed by: SURGERY

## 2017-03-15 PROCEDURE — 0 IOPAMIDOL 61 % SOLUTION: Performed by: SURGERY

## 2017-03-15 RX ORDER — PANTOPRAZOLE SODIUM 40 MG/10ML
INJECTION, POWDER, LYOPHILIZED, FOR SOLUTION INTRAVENOUS
Status: COMPLETED
Start: 2017-03-15 | End: 2017-03-15

## 2017-03-15 RX ORDER — FENTANYL CITRATE 50 UG/ML
INJECTION, SOLUTION INTRAMUSCULAR; INTRAVENOUS AS NEEDED
Status: DISCONTINUED | OUTPATIENT
Start: 2017-03-15 | End: 2017-03-15 | Stop reason: SURG

## 2017-03-15 RX ORDER — NEOSTIGMINE METHYLSULFATE 5 MG/5 ML
SYRINGE (ML) INTRAVENOUS AS NEEDED
Status: DISCONTINUED | OUTPATIENT
Start: 2017-03-15 | End: 2017-03-15 | Stop reason: SURG

## 2017-03-15 RX ORDER — MEPERIDINE HYDROCHLORIDE 25 MG/ML
12.5 INJECTION INTRAMUSCULAR; INTRAVENOUS; SUBCUTANEOUS
Status: DISCONTINUED | OUTPATIENT
Start: 2017-03-15 | End: 2017-03-15 | Stop reason: HOSPADM

## 2017-03-15 RX ORDER — PROPOFOL 10 MG/ML
INJECTION, EMULSION INTRAVENOUS AS NEEDED
Status: DISCONTINUED | OUTPATIENT
Start: 2017-03-15 | End: 2017-03-15 | Stop reason: SURG

## 2017-03-15 RX ORDER — PROMETHAZINE HYDROCHLORIDE 25 MG/1
25 SUPPOSITORY RECTAL ONCE AS NEEDED
Status: DISCONTINUED | OUTPATIENT
Start: 2017-03-15 | End: 2017-03-15 | Stop reason: HOSPADM

## 2017-03-15 RX ORDER — BUPIVACAINE HYDROCHLORIDE 5 MG/ML
INJECTION, SOLUTION EPIDURAL; INTRACAUDAL
Status: DISCONTINUED
Start: 2017-03-15 | End: 2017-03-15 | Stop reason: HOSPADM

## 2017-03-15 RX ORDER — HYDROMORPHONE HCL 110MG/55ML
PATIENT CONTROLLED ANALGESIA SYRINGE INTRAVENOUS AS NEEDED
Status: DISCONTINUED | OUTPATIENT
Start: 2017-03-15 | End: 2017-03-15 | Stop reason: SURG

## 2017-03-15 RX ORDER — ONDANSETRON 4 MG/1
4 TABLET, FILM COATED ORAL ONCE AS NEEDED
Status: DISCONTINUED | OUTPATIENT
Start: 2017-03-15 | End: 2017-03-15 | Stop reason: HOSPADM

## 2017-03-15 RX ORDER — PANTOPRAZOLE SODIUM 40 MG/10ML
40 INJECTION, POWDER, LYOPHILIZED, FOR SOLUTION INTRAVENOUS ONCE
Status: COMPLETED | OUTPATIENT
Start: 2017-03-15 | End: 2017-03-15

## 2017-03-15 RX ORDER — PROMETHAZINE HYDROCHLORIDE 25 MG/ML
12.5 INJECTION, SOLUTION INTRAMUSCULAR; INTRAVENOUS ONCE AS NEEDED
Status: DISCONTINUED | OUTPATIENT
Start: 2017-03-15 | End: 2017-03-15 | Stop reason: HOSPADM

## 2017-03-15 RX ORDER — SODIUM CHLORIDE, SODIUM LACTATE, POTASSIUM CHLORIDE, CALCIUM CHLORIDE 600; 310; 30; 20 MG/100ML; MG/100ML; MG/100ML; MG/100ML
1000 INJECTION, SOLUTION INTRAVENOUS CONTINUOUS PRN
Status: DISCONTINUED | OUTPATIENT
Start: 2017-03-15 | End: 2017-03-15 | Stop reason: HOSPADM

## 2017-03-15 RX ORDER — BUPIVACAINE HYDROCHLORIDE 5 MG/ML
INJECTION, SOLUTION EPIDURAL; INTRACAUDAL AS NEEDED
Status: DISCONTINUED | OUTPATIENT
Start: 2017-03-15 | End: 2017-03-15 | Stop reason: HOSPADM

## 2017-03-15 RX ORDER — SODIUM CHLORIDE 9 MG/ML
100 INJECTION, SOLUTION INTRAVENOUS CONTINUOUS
Status: DISCONTINUED | OUTPATIENT
Start: 2017-03-15 | End: 2017-03-15 | Stop reason: HOSPADM

## 2017-03-15 RX ORDER — BUPIVACAINE HYDROCHLORIDE AND EPINEPHRINE 5; 5 MG/ML; UG/ML
INJECTION, SOLUTION EPIDURAL; INTRACAUDAL; PERINEURAL
Status: DISCONTINUED
Start: 2017-03-15 | End: 2017-03-15 | Stop reason: WASHOUT

## 2017-03-15 RX ORDER — HYDROCODONE BITARTRATE AND ACETAMINOPHEN 7.5; 325 MG/1; MG/1
1 TABLET ORAL ONCE AS NEEDED
Status: DISCONTINUED | OUTPATIENT
Start: 2017-03-15 | End: 2017-03-15 | Stop reason: HOSPADM

## 2017-03-15 RX ORDER — PROMETHAZINE HYDROCHLORIDE 25 MG/ML
6.25 INJECTION, SOLUTION INTRAMUSCULAR; INTRAVENOUS ONCE AS NEEDED
Status: DISCONTINUED | OUTPATIENT
Start: 2017-03-15 | End: 2017-03-15 | Stop reason: HOSPADM

## 2017-03-15 RX ORDER — GLYCOPYRROLATE 0.2 MG/ML
INJECTION INTRAMUSCULAR; INTRAVENOUS AS NEEDED
Status: DISCONTINUED | OUTPATIENT
Start: 2017-03-15 | End: 2017-03-15 | Stop reason: SURG

## 2017-03-15 RX ORDER — SUCCINYLCHOLINE CHLORIDE 20 MG/ML
INJECTION INTRAMUSCULAR; INTRAVENOUS AS NEEDED
Status: DISCONTINUED | OUTPATIENT
Start: 2017-03-15 | End: 2017-03-15 | Stop reason: SURG

## 2017-03-15 RX ORDER — ACETAMINOPHEN 10 MG/ML
INJECTION, SOLUTION INTRAVENOUS
Status: COMPLETED
Start: 2017-03-15 | End: 2017-03-15

## 2017-03-15 RX ORDER — ONDANSETRON 2 MG/ML
4 INJECTION INTRAMUSCULAR; INTRAVENOUS ONCE AS NEEDED
Status: DISCONTINUED | OUTPATIENT
Start: 2017-03-15 | End: 2017-03-15 | Stop reason: HOSPADM

## 2017-03-15 RX ORDER — IBUPROFEN 600 MG/1
600 TABLET ORAL EVERY 6 HOURS PRN
Status: DISCONTINUED | OUTPATIENT
Start: 2017-03-15 | End: 2017-03-15 | Stop reason: HOSPADM

## 2017-03-15 RX ORDER — DEXAMETHASONE SODIUM PHOSPHATE 4 MG/ML
INJECTION, SOLUTION INTRA-ARTICULAR; INTRALESIONAL; INTRAMUSCULAR; INTRAVENOUS; SOFT TISSUE AS NEEDED
Status: DISCONTINUED | OUTPATIENT
Start: 2017-03-15 | End: 2017-03-15 | Stop reason: SURG

## 2017-03-15 RX ORDER — MIDAZOLAM HYDROCHLORIDE 1 MG/ML
INJECTION INTRAMUSCULAR; INTRAVENOUS AS NEEDED
Status: DISCONTINUED | OUTPATIENT
Start: 2017-03-15 | End: 2017-03-15 | Stop reason: SURG

## 2017-03-15 RX ORDER — ROCURONIUM BROMIDE 10 MG/ML
INJECTION, SOLUTION INTRAVENOUS AS NEEDED
Status: DISCONTINUED | OUTPATIENT
Start: 2017-03-15 | End: 2017-03-15 | Stop reason: SURG

## 2017-03-15 RX ORDER — PROMETHAZINE HYDROCHLORIDE 25 MG/1
25 TABLET ORAL ONCE AS NEEDED
Status: DISCONTINUED | OUTPATIENT
Start: 2017-03-15 | End: 2017-03-15 | Stop reason: HOSPADM

## 2017-03-15 RX ORDER — HYDROCODONE BITARTRATE AND ACETAMINOPHEN 7.5; 325 MG/1; MG/1
TABLET ORAL
Status: COMPLETED
Start: 2017-03-15 | End: 2017-03-15

## 2017-03-15 RX ORDER — HYDROCODONE BITARTRATE AND ACETAMINOPHEN 7.5; 325 MG/1; MG/1
1-2 TABLET ORAL EVERY 4 HOURS PRN
Qty: 20 TABLET | Refills: 0 | Status: SHIPPED | OUTPATIENT
Start: 2017-03-15 | End: 2017-03-29

## 2017-03-15 RX ORDER — IPRATROPIUM BROMIDE AND ALBUTEROL SULFATE 2.5; .5 MG/3ML; MG/3ML
3 SOLUTION RESPIRATORY (INHALATION) ONCE AS NEEDED
Status: DISCONTINUED | OUTPATIENT
Start: 2017-03-15 | End: 2017-03-15 | Stop reason: HOSPADM

## 2017-03-15 RX ORDER — ONDANSETRON 2 MG/ML
INJECTION INTRAMUSCULAR; INTRAVENOUS AS NEEDED
Status: DISCONTINUED | OUTPATIENT
Start: 2017-03-15 | End: 2017-03-15 | Stop reason: SURG

## 2017-03-15 RX ADMIN — HYDROMORPHONE HYDROCHLORIDE 0.5 MG: 1 INJECTION, SOLUTION INTRAMUSCULAR; INTRAVENOUS; SUBCUTANEOUS at 10:35

## 2017-03-15 RX ADMIN — FENTANYL CITRATE 100 MCG: 50 INJECTION, SOLUTION INTRAMUSCULAR; INTRAVENOUS at 09:04

## 2017-03-15 RX ADMIN — LIDOCAINE HYDROCHLORIDE 60 MG: 20 INJECTION, SOLUTION INTRAVENOUS at 09:04

## 2017-03-15 RX ADMIN — SODIUM CHLORIDE, POTASSIUM CHLORIDE, SODIUM LACTATE AND CALCIUM CHLORIDE 1000 ML: 600; 310; 30; 20 INJECTION, SOLUTION INTRAVENOUS at 07:50

## 2017-03-15 RX ADMIN — SODIUM CHLORIDE 3 G: 9 INJECTION, SOLUTION INTRAVENOUS at 09:06

## 2017-03-15 RX ADMIN — HYDROMORPHONE HYDROCHLORIDE 0.5 MG: 1 INJECTION, SOLUTION INTRAMUSCULAR; INTRAVENOUS; SUBCUTANEOUS at 10:20

## 2017-03-15 RX ADMIN — ROCURONIUM BROMIDE 10 MG: 10 INJECTION INTRAVENOUS at 09:04

## 2017-03-15 RX ADMIN — ONDANSETRON 4 MG: 2 INJECTION INTRAMUSCULAR; INTRAVENOUS at 09:43

## 2017-03-15 RX ADMIN — ROCURONIUM BROMIDE 30 MG: 10 INJECTION INTRAVENOUS at 09:14

## 2017-03-15 RX ADMIN — SUCCINYLCHOLINE CHLORIDE 160 MG: 20 INJECTION, SOLUTION INTRAMUSCULAR; INTRAVENOUS at 09:04

## 2017-03-15 RX ADMIN — HYDROCODONE BITARTRATE AND ACETAMINOPHEN 1 TABLET: 7.5; 325 TABLET ORAL at 11:49

## 2017-03-15 RX ADMIN — MIDAZOLAM HYDROCHLORIDE 2 MG: 1 INJECTION, SOLUTION INTRAMUSCULAR; INTRAVENOUS at 09:01

## 2017-03-15 RX ADMIN — HYDROMORPHONE HYDROCHLORIDE 0.4 MG: 2 INJECTION, SOLUTION INTRAMUSCULAR; INTRAVENOUS; SUBCUTANEOUS at 09:42

## 2017-03-15 RX ADMIN — Medication 5 MG: at 09:49

## 2017-03-15 RX ADMIN — PROPOFOL 200 MG: 10 INJECTION, EMULSION INTRAVENOUS at 09:04

## 2017-03-15 RX ADMIN — ACETAMINOPHEN 1000 MG: 10 INJECTION, SOLUTION INTRAVENOUS at 09:34

## 2017-03-15 RX ADMIN — DEXAMETHASONE SODIUM PHOSPHATE 4 MG: 4 INJECTION, SOLUTION INTRAMUSCULAR; INTRAVENOUS at 09:43

## 2017-03-15 RX ADMIN — GLYCOPYRROLATE 0.8 MG: 0.2 INJECTION, SOLUTION INTRAMUSCULAR; INTRAVENOUS at 09:49

## 2017-03-15 RX ADMIN — HYDROMORPHONE HYDROCHLORIDE 0.6 MG: 2 INJECTION, SOLUTION INTRAMUSCULAR; INTRAVENOUS; SUBCUTANEOUS at 09:56

## 2017-03-15 RX ADMIN — SODIUM CHLORIDE, POTASSIUM CHLORIDE, SODIUM LACTATE AND CALCIUM CHLORIDE: 600; 310; 30; 20 INJECTION, SOLUTION INTRAVENOUS at 09:52

## 2017-03-15 RX ADMIN — FENTANYL CITRATE 100 MCG: 50 INJECTION, SOLUTION INTRAMUSCULAR; INTRAVENOUS at 09:14

## 2017-03-15 RX ADMIN — PANTOPRAZOLE SODIUM 40 MG: 40 INJECTION, POWDER, LYOPHILIZED, FOR SOLUTION INTRAVENOUS at 08:00

## 2017-03-15 RX ADMIN — PANTOPRAZOLE SODIUM 40 MG: 40 INJECTION, POWDER, FOR SOLUTION INTRAVENOUS at 08:00

## 2017-03-15 NOTE — PLAN OF CARE
Problem: Perioperative Period (Adult)  Goal: Signs and Symptoms of Listed Potential Problems Will be Absent or Manageable (Perioperative Period)    03/15/17 0878   Perioperative Period   Problems Present (Perioperative Period) none

## 2017-03-15 NOTE — DISCHARGE INSTRUCTIONS
To assist you in voiding:  Drink plenty of fluids  Listen to running water while attempting to void.    If you are unable to urinate and you have an uncomfortable urge to void or it has been   6 hours since you were discharged, return to the Emergency Room    Rest today  No pushing,pulling,tugging,heavy lifting, or strenuous activity   No major decision making,driving,or drinking alcoholic beverages for 24 hours due to the sedation you received  Always use good hand hygiene/washing technique  No driving on pain medication.

## 2017-03-15 NOTE — ANESTHESIA POSTPROCEDURE EVALUATION
Patient: Lea Thompson    Procedure Summary     Date Anesthesia Start Anesthesia Stop Room / Location    03/15/17 0857 1006  [facility] /  [facility]       Procedure Diagnosis Surgeon Provider    CHOLECYSTECTOMY LAPAROSCOPIC INTRAOPERATIVE CHOLANGIOGRAM (N/A Abdomen) Calculus of gallbladder without cholecystitis without obstruction  (Calculus of gallbladder without cholecystitis without obstruction [K80.20]) MD Gabby Swan CRNA          Anesthesia Type: general  Last vitals  /68 (03/15/17 1032)    Temp   98   Pulse 62 (03/15/17 1032)   Resp 14 (03/15/17 1032)    SpO2   99%     Post Anesthesia Care and Evaluation    Patient location during evaluation: PACU  Patient participation: complete - patient participated  Level of consciousness: awake  Pain score: 2  Pain management: adequate  Airway patency: patent  Anesthetic complications: No anesthetic complications  PONV Status: none  Cardiovascular status: blood pressure returned to baseline  Respiratory status: acceptable  Hydration status: acceptable

## 2017-03-15 NOTE — ANESTHESIA PREPROCEDURE EVALUATION
Anesthesia Evaluation     Patient summary reviewed and Nursing notes reviewed   no history of anesthetic complications:  NPO Status: > 8 hours   Airway   Mallampati: III  TM distance: >3 FB  Neck ROM: full  no difficulty expected and possible difficult intubation  Dental - normal exam     Pulmonary     breath sounds clear to auscultation  (+) a smoker Current, COPD mild, shortness of breath, sleep apnea on CPAP, decreased breath sounds,     ROS comment: Smokes 1.5 packs per day. Had quit for 8 years and started back 2 years ago  Cardiovascular - normal exam  Exercise tolerance: poor (<4 METS)    ECG reviewed  Rhythm: regular  Rate: normal    (+) hypertension ( no meds) poorly controlled, DEJESUS, PVD ( le edema at times le edema at times),       Neuro/Psych- negative ROS  GI/Hepatic/Renal/Endo    (+) obesity, morbid obesity, GERD, diabetes mellitus ( no meds) type 2,     ROS Comment: occassional indegestion when eats mexican food    Musculoskeletal     (+) arthralgias, myalgias,   Abdominal   (+) obese,     Abdomen: tender.   Substance History   (+) alcohol use,   (-) drug use      Comment: Rare social glass of wine   OB/GYN negative ob/gyn ROS         Other - negative ROS       ROS/Med Hx Other: EKG was normal, sr  Pt non compliant with lifestyle changes  cxr nad                                Anesthesia Plan    ASA 2     general   (Risks and benefits discussed including risk of aspiration, recall and dental damage. All patient questions answered. Will continue with plan of care. Patient told that a breathing tube will be used to manage the airway.)  intravenous induction   Anesthetic plan and risks discussed with patient.

## 2017-03-15 NOTE — H&P (VIEW-ONLY)
Reason for Consultation:     Chief complaint   Chief Complaint   Patient presents with   • Follow-up     f/u labs       Subjective .     History of present illness:  I saw the patient in the office to discuss a Lap Juliette and follow up labs.  Patient has gallstones and a hx of pancreatitis. She complains of epigastric pain.  She does have a history significant for cholelithiasis, she has had a previous admission for chronic cholecystitis with acute exacerbation and choledocholithiasis.  She has been treated with recent ERCP and stent placement, she did have postoperative pancreatitis requiring several day hospitalization.  She has quit smoking and feels markedly better.    Review of Systems   Constitutional: Positive for appetite change. Negative for fatigue and fever.   HENT: Negative for congestion, nosebleeds and postnasal drip.    Eyes: Negative for photophobia.   Respiratory: Negative for cough, choking, chest tightness and shortness of breath.    Cardiovascular: Negative for chest pain, palpitations and leg swelling.   Gastrointestinal: Positive for abdominal pain (right upper quadrant / epigastric pain) and nausea.   Endocrine: Negative for cold intolerance and heat intolerance.   Genitourinary: Negative for flank pain and frequency.   Musculoskeletal: Negative for arthralgias.   Neurological: Negative for seizures, light-headedness, numbness and headaches.   Psychiatric/Behavioral: Negative for agitation, behavioral problems, confusion and hallucinations.       History  Past Medical History   Diagnosis Date   • Cholelithiasis 2/15/2017   • Colon polyp    • Pancreatitis 2017     Acute after ercp   • Sleep apnea        Past Surgical History   Procedure Laterality Date   •  section       x2   • Polypectomy     • Colon surgery       MUCOSAL RESECTION   • Pr ercp dx collection specimen brushing/washing N/A 2017     Procedure: ENDOSCOPIC RETROGRADE CHOLANGIOPANCREATOGRAPHY WITH ENDOSCOPIC  SPHINCTEROTOMY; LITHOTRIPSY; WIREGUIDED; BALLOON EXTRACTION (9MM/12MM); PLACEMENT OF 8.5 [place]-7 CM BILIARY STENT; EXTRACTION OF STONE FROM RIGHT INTRAHEPATIC DUCT;  Surgeon: Chaitanya Stanley MD;  Location: [facility];  Service: Gastroenterology   • Tubal abdominal ligation  1990   • Colonoscopy  2003   • Flexible sigmoidoscopy  2003       Family History   Problem Relation Age of Onset   • Sarcoidosis Mother    • Colon polyps Mother    • Cancer Father    • Heart disease Father    • Pancreatitis Father      He is due to ercp       Social History   Substance Use Topics   • Smoking status: Current Every Day Smoker     Packs/day: 0.50     Years: 15.00     Types: Cigarettes   • Smokeless tobacco: Never Used      Comment: Have quit twice the first time for 8 yrs second time for two years resumed 2015   • Alcohol use Yes     1 Glasses of wine per week      Comment: Very rarely maybe once or twice a year       Review of patient's allergies indicates no known allergies.    Objective     Vital Signs   Temp:  [98.2 °F (36.8 °C)-99.1 °F (37.3 °C)] 98.2 °F (36.8 °C)  Heart Rate:  [68-77] 68  Resp:  [16] 16  BP: (130)/(78) 130/78    Physical Exam:     General Appearance:    Alert, cooperative, in no acute distress   Head:    Normocephalic, without obvious abnormality, atraumatic   Eyes:            Lids and lashes normal, conjunctivae and sclerae normal, no   icterus, no pallor, corneas clear, PERRLA   Ears:    Ears appear intact with no abnormalities noted   Throat:   No oral lesions, no thrush, oral mucosa moist   Neck:   No adenopathy, supple, trachea midline, no thyromegaly, no   carotid bruit, no JVD   Back:     No kyphosis present, no scoliosis present, no skin lesions,      erythema or scars, no tenderness to percussion or                   palpation,   range of motion normal   Lungs:     Clear to auscultation,respirations regular, even and                  unlabored    Heart:    Regular rhythm and normal rate, normal S1 and  S2, no            murmur   Abdomen:     no masses, no organomegaly, soft non-tender, non-distended, no guarding, there is evidence of right upper quadrant tenderness   Extremities:   Moves all extremities well, no edema, no cyanosis, no             redness   Pulses:   Pulses palpable and equal bilaterally   Skin:   No bleeding, bruising or rash   Lymph nodes:   No palpable adenopathy   Neurologic:   Cranial nerves 2 - 12 grossly intact, sensation intact        Results Review:   I reviewed the patient's new clinical results.  I reviewed the patient's new imaging results and agree with the interpretation.  I reviewed the patient's other test results and agree with the interpretation      Assessment/Plan :    I had a detailed and extensive discussion with the patient in the office and they understand that they need to undergo laparoscopic cholecystectomy with intraoperative cholangiography or possible open cholecystectomy. Full risks and benefits of operative versus nonoperative intervention were discussed with the patient and these included things such as nonresolution of symptoms and possible worsening of symptoms without surgical intervention versus infection, bleeding, open cholecystectomy, common bile duct injury, postoperative biliary leakage, need for drain placement, possible inability to perform cholangiography due to inflammation, postoperative abscess, etc with surgical intervention. The patient understands, agrees, and wishes to proceed with the surgical treatment plan as mentioned above. The patient had no questions for me at the end of the discussion.  I did draw a picture of the anatomy for the patient and used this in my informed consent.    I discussed the patients findings and my recommendations with patient.        Benjamin Reaves MD  02/28/17  2:08 PM

## 2017-03-15 NOTE — PLAN OF CARE
Problem: Perioperative Period (Adult)  Intervention: Promote Pulmonary Hygiene and Secretion Clearance    03/15/17 1016   Promote Aggressive Pulmonary Hygiene/Secretion Management   Cough And Deep Breathing done with encouragement   Positioning   Head Of Bed (HOB) Position HOB elevated   Activity   Activity Type bedrest       Intervention: Monitor/Manage Pain    03/15/17 1001   Manage Acute Burn Pain   Pain Management Interventions cold applied       Intervention: Promote Normothermia    03/15/17 1016   Cardiac Interventions   Warming Thermoregulation Maintenance warm blankets applied

## 2017-03-15 NOTE — OP NOTE
PATIENT:     Lea Thompson    DATE OF SURGERY:     3/15/2017    PHYSICIAN:   Benjamin Reaves MD    REFERRING PHYSICIAN:  No Known Provider    YOB: 1965    PREOPERATIVE DIAGNOSIS:  Chronic cholecystitis due to cholelithiasis    POSTOPERATIVE DIAGNOSIS:  Chronic cholecystitis due to cholelithiasis    PROCEDURE:  Laparoscopic cholecystectomy with intraoperative cholangiography.    ANESTHESIA:  General endotracheal.    HISTORY:  The patient was sent to me as a consultation via Benjamin Reaves MD for evaluation and treatment of chronic right upper quadrant and mid epigastric abdominal discomfort.  Workup was begun and the patient was subsequently found to have chronic cholecystitis due to cholelithiasis.  The patient is here now today for elective laparoscopic cholecystectomy with intraoperative cholangiography for treatment of chronic cholecystitis.  The procedure was discussed with the patient preoperatively who understands, agrees, and wishes to proceed with the above-mentioned procedure in an elective outpatient fashion.      OPERATIVE PROCEDURE:  The patient was taken to the operating room, placed in the supine position, and given general endotracheal anesthesia.  The patient was prepped and draped in the normal sterile fashion, and also received preoperative IV antibiotics.  An appropriate timeout was performed by the nursing staff prior to the incision.  I did discuss the situation with the patient preoperatively.      An umbilical incision was then made to insert a Veress needle to insufflate the abdomen with carbon dioxide, and a separate 5 mm port was inserted here along with a camera via an Optiview.  A separate subxiphoid port was inserted along with two right subcostal 5 mm ports.  The gallbladder was well visualized.    Good exposure was obtained, and the gallbladder was grasped at its infundibulum and its fundus and retracted superiorly and laterally.  There were some chronic  attachments of fatty tissue to the gallbladder indicative of chronic cholecystitis and these were easily taken down.    Good exposure was obtained and dissection was performed in the triangle of Calot, and the cystic duct and cystic artery were identified in the normal manner.  A clip was then placed on the cystic duct proximally and then two clips were placed on the cystic artery proximally and one distally.  Cystic ductotomy was performed.  A separate cholangiogram catheter had been inserted through a right upper quadrant introducer port and then this was fed into the cystic duct itself and a clip was applied.     Intraoperative cholangiography was then performed under fluoroscopy, carefully evaluating the biliary ductal anatomy.  This was done without difficulty.  The right and left hepatic ducts were well visualized as well as the common hepatic duct.  The common bile duct was well visualized, as was the duodenum.  There was nice flow into the duodenum and there was no evidence of biliary ductal obstruction or choledocholithiasis.  There was ample distance between the cystic ductotomy and the cystic duct/common duct junction.  I did feel comfortable performing the procedure laparoscopically. There was evidence of previously placed CBD stent.    The cholangiogram catheter was removed.  The cystic duct was clipped twice distally and then divided, as was the cystic artery.  Bovie electrocautery was then used to remove the gallbladder from the liver bed.  It was then removed via the subxiphoid port site without difficulty.     Copious irrigation was used in the patient’s abdominal cavity.  It was clean and dry at this point.  Hemostasis was intact and there was no evidence of bilious leakage.  All trocar sites were injected with a local anesthetic mixture.  Trocars were removed under direct vision and the fascia was closed with 0-Vicryl suture and 4-0 Vicryl subcuticular stitch along with Steri-Strips for skin  reapproximation.      The patient was stable at this point and subsequently transferred back to the recovery room in stable condition.    Benjamin Reaves MD

## 2017-03-15 NOTE — ANESTHESIA PROCEDURE NOTES
Airway  Urgency: elective    Airway not difficult    General Information and Staff    Patient location during procedure: OR  CRNA: CATRACHITO RAMIREZ    Indications and Patient Condition  Indications for airway management: airway protection    Preoxygenated: yes  Mask difficulty assessment: 1 - vent by mask    Final Airway Details  Final airway type: endotracheal airway      Successful airway: ETT  Cuffed: yes   Successful intubation technique: direct laryngoscopy  Facilitating devices/methods: intubating stylet  Endotracheal tube insertion site: oral  Blade: Rashad  Blade size: #3  ETT size: 7.5 mm  Cormack-Lehane Classification: grade IIa - partial view of glottis  Placement verified by: chest auscultation and capnometry   Cuff volume (mL): 6  Measured from: lips  ETT to lips (cm): 20  Number of attempts at approach: 1    Additional Comments  Teeth as pre-op, easy intubation

## 2017-03-20 LAB
LAB AP CASE REPORT: NORMAL
Lab: NORMAL
PATH REPORT.FINAL DX SPEC: NORMAL

## 2017-03-29 ENCOUNTER — OFFICE VISIT (OUTPATIENT)
Dept: SURGERY | Facility: CLINIC | Age: 52
End: 2017-03-29

## 2017-03-29 VITALS
WEIGHT: 290 LBS | HEIGHT: 64 IN | TEMPERATURE: 98.4 F | SYSTOLIC BLOOD PRESSURE: 134 MMHG | BODY MASS INDEX: 49.51 KG/M2 | DIASTOLIC BLOOD PRESSURE: 80 MMHG

## 2017-03-29 DIAGNOSIS — Z48.89 POSTOPERATIVE VISIT: Primary | ICD-10-CM

## 2017-03-29 PROCEDURE — 99024 POSTOP FOLLOW-UP VISIT: CPT | Performed by: SURGERY

## 2017-03-29 NOTE — PROGRESS NOTES
Reason for Consultation: Follow-up lap sunita    Chief Complaint:   Chief Complaint   Patient presents with   • Post-op     Lap sunita       History of present illness:  I saw the patient in the office today as a followup from their recent laparoscopic cholecystectomy.  They state that they have done well and are having no complaints.    Vital Signs:   Temp:  [98.4 °F (36.9 °C)] 98.4 °F (36.9 °C)  BP: (134)/(80) 134/80    Physical Exam:   General Appearance:    Alert, cooperative, in no acute distress   Abdomen:     no masses, no organomegaly, soft non-tender, non-distended, no guarding, wounds are well healed     Assessment / Plan :    1. Postoperative visit        I did discuss the situation with the patient today in the office and they have done well from their recent laparoscopic cholecystectomy.  I have released the patient back to normal activity, they understand that they need to be careful about heavy lifting.  I need to see the patient back in the office only if they are having further problems, they know to call me if they are.    Benjamin Reaves MD  03/29/17

## 2017-04-14 ENCOUNTER — APPOINTMENT (OUTPATIENT)
Dept: CT IMAGING | Facility: HOSPITAL | Age: 52
End: 2017-04-14
Attending: EMERGENCY MEDICINE

## 2017-04-14 ENCOUNTER — HOSPITAL ENCOUNTER (EMERGENCY)
Facility: HOSPITAL | Age: 52
Discharge: HOME | End: 2017-04-14
Attending: EMERGENCY MEDICINE | Admitting: EMERGENCY MEDICINE

## 2017-04-14 VITALS
DIASTOLIC BLOOD PRESSURE: 58 MMHG | BODY MASS INDEX: 48.65 KG/M2 | RESPIRATION RATE: 16 BRPM | HEART RATE: 103 BPM | SYSTOLIC BLOOD PRESSURE: 104 MMHG | HEIGHT: 64 IN | WEIGHT: 285 LBS | OXYGEN SATURATION: 97 % | TEMPERATURE: 99.7 F

## 2017-04-14 DIAGNOSIS — K65.1 INTRA-ABDOMINAL ABSCESS (HCC): Primary | ICD-10-CM

## 2017-04-14 LAB
ALBUMIN SERPL-MCNC: 3.6 G/DL (ref 3.5–5)
ALBUMIN/GLOB SERPL: 0.9 G/DL (ref 1–2)
ALP SERPL-CCNC: 121 U/L (ref 38–126)
ALT SERPL W P-5'-P-CCNC: 36 U/L (ref 13–69)
AMORPH URATE CRY URNS QL MICRO: ABNORMAL /HPF
ANION GAP SERPL CALCULATED.3IONS-SCNC: 12 MMOL/L
AST SERPL-CCNC: 25 U/L (ref 15–46)
BACTERIA UR QL AUTO: ABNORMAL /HPF
BASOPHILS # BLD AUTO: 0.05 10*3/MM3 (ref 0–0.2)
BASOPHILS NFR BLD AUTO: 0.4 % (ref 0–2.5)
BILIRUB SERPL-MCNC: 0.8 MG/DL (ref 0.2–1.3)
BILIRUB UR QL STRIP: ABNORMAL
BUN BLD-MCNC: 10 MG/DL (ref 7–20)
BUN/CREAT SERPL: 14.3 (ref 7.1–23.5)
CALCIUM SPEC-SCNC: 9.2 MG/DL (ref 8.4–10.2)
CHLORIDE SERPL-SCNC: 105 MMOL/L (ref 98–107)
CLARITY UR: ABNORMAL
CO2 SERPL-SCNC: 27 MMOL/L (ref 26–30)
COLOR UR: ABNORMAL
CREAT BLD-MCNC: 0.7 MG/DL (ref 0.6–1.3)
DEPRECATED RDW RBC AUTO: 40.7 FL (ref 37–54)
EOSINOPHIL # BLD AUTO: 0.07 10*3/MM3 (ref 0–0.7)
EOSINOPHIL NFR BLD AUTO: 0.6 % (ref 0–7)
ERYTHROCYTE [DISTWIDTH] IN BLOOD BY AUTOMATED COUNT: 11.9 % (ref 11.5–14.5)
GFR SERPL CREATININE-BSD FRML MDRD: 89 ML/MIN/1.73
GLOBULIN UR ELPH-MCNC: 3.9 GM/DL
GLUCOSE BLD-MCNC: 151 MG/DL (ref 74–98)
GLUCOSE UR STRIP-MCNC: NEGATIVE MG/DL
HCG SERPL QL: NEGATIVE
HCT VFR BLD AUTO: 40.1 % (ref 37–47)
HGB BLD-MCNC: 13.4 G/DL (ref 12–16)
HGB UR QL STRIP.AUTO: ABNORMAL
HOLD SPECIMEN: NORMAL
HYALINE CASTS UR QL AUTO: ABNORMAL /LPF
IMM GRANULOCYTES # BLD: 0.1 10*3/MM3 (ref 0–0.06)
IMM GRANULOCYTES NFR BLD: 0.8 % (ref 0–0.6)
KETONES UR QL STRIP: ABNORMAL
LEUKOCYTE ESTERASE UR QL STRIP.AUTO: NEGATIVE
LIPASE SERPL-CCNC: 52 U/L (ref 23–300)
LYMPHOCYTES # BLD AUTO: 1.45 10*3/MM3 (ref 0.6–3.4)
LYMPHOCYTES NFR BLD AUTO: 11.5 % (ref 10–50)
MCH RBC QN AUTO: 31.6 PG (ref 27–31)
MCHC RBC AUTO-ENTMCNC: 33.4 G/DL (ref 30–37)
MCV RBC AUTO: 94.6 FL (ref 81–99)
MONOCYTES # BLD AUTO: 0.88 10*3/MM3 (ref 0–0.9)
MONOCYTES NFR BLD AUTO: 7 % (ref 0–12)
MUCOUS THREADS URNS QL MICRO: ABNORMAL /HPF
NEUTROPHILS # BLD AUTO: 10.06 10*3/MM3 (ref 2–6.9)
NEUTROPHILS NFR BLD AUTO: 79.7 % (ref 37–80)
NITRITE UR QL STRIP: NEGATIVE
NRBC BLD MANUAL-RTO: 0 /100 WBC (ref 0–0)
PH UR STRIP.AUTO: 5.5 [PH] (ref 5–8)
PLATELET # BLD AUTO: 280 10*3/MM3 (ref 130–400)
PMV BLD AUTO: 10 FL (ref 6–12)
POTASSIUM BLD-SCNC: 4 MMOL/L (ref 3.5–5.1)
PROT SERPL-MCNC: 7.5 G/DL (ref 6.3–8.2)
PROT UR QL STRIP: ABNORMAL
RBC # BLD AUTO: 4.24 10*6/MM3 (ref 4.2–5.4)
RBC # UR: ABNORMAL /HPF
REF LAB TEST METHOD: ABNORMAL
SODIUM BLD-SCNC: 140 MMOL/L (ref 137–145)
SP GR UR STRIP: 1.02 (ref 1–1.03)
SQUAMOUS #/AREA URNS HPF: ABNORMAL /HPF
UROBILINOGEN UR QL STRIP: ABNORMAL
WBC NRBC COR # BLD: 12.61 10*3/MM3 (ref 4.8–10.8)
WBC UR QL AUTO: ABNORMAL /HPF
WHOLE BLOOD HOLD SPECIMEN: NORMAL
WHOLE BLOOD HOLD SPECIMEN: NORMAL

## 2017-04-14 PROCEDURE — 25010000002 LEVOFLOXACIN PER 250 MG: Performed by: EMERGENCY MEDICINE

## 2017-04-14 PROCEDURE — 25010000002 ONDANSETRON PER 1 MG: Performed by: EMERGENCY MEDICINE

## 2017-04-14 PROCEDURE — 25010000002 MORPHINE PER 10 MG: Performed by: EMERGENCY MEDICINE

## 2017-04-14 PROCEDURE — 96375 TX/PRO/DX INJ NEW DRUG ADDON: CPT

## 2017-04-14 PROCEDURE — 96361 HYDRATE IV INFUSION ADD-ON: CPT

## 2017-04-14 PROCEDURE — 84703 CHORIONIC GONADOTROPIN ASSAY: CPT | Performed by: EMERGENCY MEDICINE

## 2017-04-14 PROCEDURE — 83690 ASSAY OF LIPASE: CPT | Performed by: EMERGENCY MEDICINE

## 2017-04-14 PROCEDURE — 0 DIATRIZOATE MEGLUMINE & SODIUM PER 1 ML: Performed by: EMERGENCY MEDICINE

## 2017-04-14 PROCEDURE — 96367 TX/PROPH/DG ADDL SEQ IV INF: CPT

## 2017-04-14 PROCEDURE — 96365 THER/PROPH/DIAG IV INF INIT: CPT

## 2017-04-14 PROCEDURE — 85025 COMPLETE CBC W/AUTO DIFF WBC: CPT | Performed by: EMERGENCY MEDICINE

## 2017-04-14 PROCEDURE — 80053 COMPREHEN METABOLIC PANEL: CPT | Performed by: EMERGENCY MEDICINE

## 2017-04-14 PROCEDURE — 74177 CT ABD & PELVIS W/CONTRAST: CPT

## 2017-04-14 PROCEDURE — 99283 EMERGENCY DEPT VISIT LOW MDM: CPT

## 2017-04-14 PROCEDURE — 81001 URINALYSIS AUTO W/SCOPE: CPT | Performed by: EMERGENCY MEDICINE

## 2017-04-14 PROCEDURE — 0 IOPAMIDOL 61 % SOLUTION: Performed by: EMERGENCY MEDICINE

## 2017-04-14 RX ORDER — LEVOFLOXACIN 500 MG/1
500 TABLET, FILM COATED ORAL DAILY
Qty: 7 TABLET | Refills: 0 | Status: SHIPPED | OUTPATIENT
Start: 2017-04-14 | End: 2017-10-30

## 2017-04-14 RX ORDER — SODIUM CHLORIDE 9 MG/ML
125 INJECTION, SOLUTION INTRAVENOUS CONTINUOUS
Status: DISCONTINUED | OUTPATIENT
Start: 2017-04-14 | End: 2017-04-14 | Stop reason: HOSPADM

## 2017-04-14 RX ORDER — ONDANSETRON 2 MG/ML
4 INJECTION INTRAMUSCULAR; INTRAVENOUS ONCE
Status: COMPLETED | OUTPATIENT
Start: 2017-04-14 | End: 2017-04-14

## 2017-04-14 RX ORDER — LEVOFLOXACIN 5 MG/ML
500 INJECTION, SOLUTION INTRAVENOUS ONCE
Status: COMPLETED | OUTPATIENT
Start: 2017-04-14 | End: 2017-04-14

## 2017-04-14 RX ORDER — OXYCODONE HYDROCHLORIDE AND ACETAMINOPHEN 5; 325 MG/1; MG/1
1 TABLET ORAL EVERY 6 HOURS PRN
Qty: 12 TABLET | Refills: 0 | Status: SHIPPED | OUTPATIENT
Start: 2017-04-14 | End: 2017-10-30

## 2017-04-14 RX ORDER — MORPHINE SULFATE 4 MG/ML
4 INJECTION, SOLUTION INTRAMUSCULAR; INTRAVENOUS ONCE
Status: COMPLETED | OUTPATIENT
Start: 2017-04-14 | End: 2017-04-14

## 2017-04-14 RX ORDER — METRONIDAZOLE 500 MG/1
500 TABLET ORAL 2 TIMES DAILY
Qty: 14 TABLET | Refills: 0 | Status: SHIPPED | OUTPATIENT
Start: 2017-04-14 | End: 2017-05-11

## 2017-04-14 RX ORDER — NITROFURANTOIN 25; 75 MG/1; MG/1
100 CAPSULE ORAL 2 TIMES DAILY
COMMUNITY
End: 2017-05-26

## 2017-04-14 RX ORDER — SODIUM CHLORIDE 0.9 % (FLUSH) 0.9 %
10 SYRINGE (ML) INJECTION AS NEEDED
Status: DISCONTINUED | OUTPATIENT
Start: 2017-04-14 | End: 2017-04-14 | Stop reason: HOSPADM

## 2017-04-14 RX ADMIN — DIATRIZOATE MEGLUMINE AND DIATRIZOATE SODIUM 30 ML: 660; 100 LIQUID ORAL; RECTAL at 11:33

## 2017-04-14 RX ADMIN — SODIUM CHLORIDE 125 ML/HR: 9 INJECTION, SOLUTION INTRAVENOUS at 09:53

## 2017-04-14 RX ADMIN — IOPAMIDOL 100 ML: 612 INJECTION, SOLUTION INTRAVENOUS at 11:33

## 2017-04-14 RX ADMIN — METRONIDAZOLE 500 MG: 500 INJECTION, SOLUTION INTRAVENOUS at 13:30

## 2017-04-14 RX ADMIN — ONDANSETRON 4 MG: 2 INJECTION INTRAMUSCULAR; INTRAVENOUS at 11:56

## 2017-04-14 RX ADMIN — LEVOFLOXACIN 500 MG: 5 INJECTION, SOLUTION INTRAVENOUS at 12:38

## 2017-04-14 RX ADMIN — MORPHINE SULFATE 4 MG: 4 INJECTION, SOLUTION INTRAMUSCULAR; INTRAVENOUS at 11:57

## 2017-04-14 NOTE — DISCHARGE INSTRUCTIONS
You may take Tylenol 650mg every 6-8 hours as well as ibuprofen 600mg every 6-8 hours as needed for pain or fever.  Please take the prescribed oxycodone only when your pain is not otherwise controlled with Tylenol or ibuprofen and when you do not have any dangerous activities to perform such as driving, as this can be dangerous.

## 2017-04-14 NOTE — ED PROVIDER NOTES
"Subjective   History of Present Illness   51F w/ hx of obesity, CKD, IBD and who is 4 wks s/p laparoscopic cholecystectomy p/w RUQ abd pain and intermittent fevers. Describes one week of intermittent fevers to Tm 103F. Endorses anorexia as well. Was seen yesterday at  and given macrobid for \"slight UTI\". Denies urinary symptoms, n/v/d or other complaints.     Review of Systems   Gastrointestinal: Positive for abdominal pain.   All other systems reviewed and are negative.      Past Medical History:   Diagnosis Date   • Cholelithiasis 2/15/2017   • Colon polyp    • Pancreatitis 2017    Acute after ercp, REPORTS STENT INTACT AT PRESENT TIME   • Sleep apnea     CPAP HS, INSTRUCTED TO BRING THIS IN THE DOS   • Wears glasses        No Known Allergies    Past Surgical History:   Procedure Laterality Date   •  SECTION      x2   • CHOLECYSTECTOMY     • CHOLECYSTECTOMY WITH INTRAOPERATIVE CHOLANGIOGRAM N/A 3/15/2017    Procedure: CHOLECYSTECTOMY LAPAROSCOPIC INTRAOPERATIVE CHOLANGIOGRAM;  Surgeon: Benjamin Reaves MD;  Location: [facility] OR;  Service:    • COLON SURGERY      MUCOSAL RESECTION   • COLONOSCOPY     • FLEXIBLE SIGMOIDOSCOPY     • POLYPECTOMY     • HI ERCP DX COLLECTION SPECIMEN BRUSHING/WASHING N/A 2017    Procedure: ENDOSCOPIC RETROGRADE CHOLANGIOPANCREATOGRAPHY WITH ENDOSCOPIC SPHINCTEROTOMY; LITHOTRIPSY; WIREGUIDED; BALLOON EXTRACTION (9MM/12MM); PLACEMENT OF 8.5 [place]-7 CM BILIARY STENT; EXTRACTION OF STONE FROM RIGHT INTRAHEPATIC DUCT;  Surgeon: Chaitanya Stanley MD;  Location: [facility] OR;  Service: Gastroenterology   • TUBAL ABDOMINAL LIGATION         Family History   Problem Relation Age of Onset   • Sarcoidosis Mother    • Colon polyps Mother    • Cancer Father    • Heart disease Father    • Pancreatitis Father      He is due to ercp       Social History     Social History   • Marital status:      Spouse name: N/A   • Number of children: N/A   • Years of education: " N/A     Social History Main Topics   • Smoking status: Current Every Day Smoker     Packs/day: 0.50     Years: 15.00     Types: Cigarettes   • Smokeless tobacco: Never Used   • Alcohol use Yes     1 Glasses of wine per week      Comment: REPORTS RARE USE, WINE ONCE OR TWICE YEARLY   • Drug use: No   • Sexual activity: Defer      Comment:      Other Topics Concern   • None     Social History Narrative   • None           Objective   Physical Exam   Constitutional: She is oriented to person, place, and time. She appears well-developed and well-nourished. No distress.   obese   HENT:   Head: Normocephalic.   Mouth/Throat: Oropharynx is clear and moist. No oropharyngeal exudate.   Eyes: Conjunctivae are normal. No scleral icterus.   Neck: Neck supple. No tracheal deviation present.   Cardiovascular: Normal rate, regular rhythm, normal heart sounds and intact distal pulses.  Exam reveals no gallop and no friction rub.    No murmur heard.  Pulmonary/Chest: Effort normal and breath sounds normal. No stridor. No respiratory distress. She has no wheezes. She has no rales. She exhibits no tenderness.   Abdominal: Soft. She exhibits no distension and no mass. There is tenderness (RUQ). There is no rebound and no guarding. No hernia.   Genitourinary:   Genitourinary Comments: R CVA ttp   Musculoskeletal: She exhibits no edema or deformity.   Neurological: She is alert and oriented to person, place, and time.   Skin: Skin is warm and dry. She is not diaphoretic. No erythema. No pallor.   Psychiatric: She has a normal mood and affect. Her behavior is normal.   Nursing note and vitals reviewed.      Procedures         ED Course  ED Course                  MDM   51F s/p lap sunita wks ago here w/ intermittent fevers and RUQ pain. AFVSS though slightly tachycardic. TTP RUQ and R CVA. Concern for pyelonephritis vs post-op abscess vs post-op hematoma now infected vs other. Plan for labs, imaging, ivf and pt defers pain control at  this point.     12:05 PM Labs show leukocytosis. CT shows 5.3 x 4.0 cm fluid collection c/w abscess. Will start on levoquin / flagyl and discuss w/ Dr. Reaves.   12:14 PM Discussed w/ Dr. Reaves, who felt that pt would be unlikely to get a drain placed today.  He felt that if she were clinically stable, which she appears to be, she would be okay to go home with antibiotics and he would set up for a drain to be placed on Monday.  I discussed this with the patient, and I informed her that this may increase her risk of worsening causing a worsening infection and possible death, and they would need to be very vigilant about how she is feeling and return if any worsening in pain, vomiting, or other symptoms that concern her.  She states that she understands this risk and would prefer to go home area and her  will be with her and they live within 5 minutes of the hospital and agree to return if any new or worsening symptoms.  They will also call Dr. Reaves to arrange for outpatient follow-up on Monday.    Final diagnoses:   Intra-abdominal abscess            Dillon Cruz MD  04/14/17 0713

## 2017-04-17 ENCOUNTER — OFFICE VISIT (OUTPATIENT)
Dept: SURGERY | Facility: CLINIC | Age: 52
End: 2017-04-17

## 2017-04-17 DIAGNOSIS — N39.0 URINARY TRACT INFECTION WITHOUT HEMATURIA, SITE UNSPECIFIED: ICD-10-CM

## 2017-04-17 DIAGNOSIS — R10.11 RIGHT UPPER QUADRANT ABDOMINAL PAIN: Primary | ICD-10-CM

## 2017-04-17 PROCEDURE — 99213 OFFICE O/P EST LOW 20 MIN: CPT | Performed by: SURGERY

## 2017-04-22 ENCOUNTER — RESULTS ENCOUNTER (OUTPATIENT)
Dept: SURGERY | Facility: CLINIC | Age: 52
End: 2017-04-22

## 2017-04-22 DIAGNOSIS — R10.11 RIGHT UPPER QUADRANT ABDOMINAL PAIN: ICD-10-CM

## 2017-04-22 DIAGNOSIS — N39.0 URINARY TRACT INFECTION WITHOUT HEMATURIA, SITE UNSPECIFIED: ICD-10-CM

## 2017-04-24 ENCOUNTER — RESULTS ENCOUNTER (OUTPATIENT)
Dept: SURGERY | Facility: CLINIC | Age: 52
End: 2017-04-24

## 2017-04-24 DIAGNOSIS — N39.0 URINARY TRACT INFECTION WITHOUT HEMATURIA, SITE UNSPECIFIED: ICD-10-CM

## 2017-04-24 DIAGNOSIS — R10.11 RIGHT UPPER QUADRANT ABDOMINAL PAIN: ICD-10-CM

## 2017-04-25 ENCOUNTER — OFFICE VISIT (OUTPATIENT)
Dept: SURGERY | Facility: CLINIC | Age: 52
End: 2017-04-25

## 2017-04-25 VITALS
HEIGHT: 64 IN | SYSTOLIC BLOOD PRESSURE: 130 MMHG | TEMPERATURE: 97.8 F | WEIGHT: 285 LBS | DIASTOLIC BLOOD PRESSURE: 78 MMHG | BODY MASS INDEX: 48.65 KG/M2

## 2017-04-25 DIAGNOSIS — Z48.89 POSTOPERATIVE VISIT: Primary | ICD-10-CM

## 2017-04-25 PROCEDURE — 99024 POSTOP FOLLOW-UP VISIT: CPT | Performed by: SURGERY

## 2017-04-25 NOTE — PROGRESS NOTES
Primary Care Provider: No Known Provider    Reason for Consultation: Follow-up lap sunita    Chief Complaint:   Chief Complaint   Patient presents with   • Post-op     s/p lap sunita       History of present illness:  I saw the patient in the office today as a followup from their recent laparoscopic cholecystectomy. They state the high fever and dark urine has improved. Patient had a RUQ ultrasound that had shown a small fluid collection in the gallbladder. She has finished all antibiotics. She does state that she is tolerating a regular diet, having normal bowel function, there is no history of fever at home.    Vital Signs:   Temp:  [97.8 °F (36.6 °C)] 97.8 °F (36.6 °C)  BP: (130)/(78) 130/78    Physical Exam:   General Appearance:    Alert, cooperative, in no acute distress   Abdomen:     no masses, no organomegaly, soft non-tender, non-distended, no guarding, wounds are well healed     Assessment / Plan :    1. Postoperative visit        I did discuss the situation with the patient today in the office and they have done well from their recent laparoscopic cholecystectomy.  I have released the patient back to normal activity, they understand that they need to be careful about heavy lifting.  I need to see the patient back in the office only if they are having further problems, they know to call me if they are. She knows to contact Dr. Stanley's office for schedule of ERCP stent removal after another 2 weeks.    Benjamin Reaves MD  04/25/17

## 2017-05-11 ENCOUNTER — OFFICE VISIT (OUTPATIENT)
Dept: GASTROENTEROLOGY | Facility: CLINIC | Age: 52
End: 2017-05-11

## 2017-05-11 VITALS
DIASTOLIC BLOOD PRESSURE: 86 MMHG | RESPIRATION RATE: 16 BRPM | HEIGHT: 64 IN | HEART RATE: 99 BPM | BODY MASS INDEX: 50.02 KG/M2 | WEIGHT: 293 LBS | TEMPERATURE: 99 F | SYSTOLIC BLOOD PRESSURE: 151 MMHG

## 2017-05-11 DIAGNOSIS — K65.1 RIGHT UPPER QUADRANT ABSCESS (HCC): ICD-10-CM

## 2017-05-11 DIAGNOSIS — K83.8 DILATED CBD, ACQUIRED: ICD-10-CM

## 2017-05-11 DIAGNOSIS — R11.0 NAUSEA: ICD-10-CM

## 2017-05-11 DIAGNOSIS — R10.11 PAIN, ABDOMINAL, RUQ: Primary | ICD-10-CM

## 2017-05-11 DIAGNOSIS — K80.50 CHOLEDOCHOLITHIASIS: ICD-10-CM

## 2017-05-11 PROCEDURE — 99214 OFFICE O/P EST MOD 30 MIN: CPT | Performed by: INTERNAL MEDICINE

## 2017-05-11 RX ORDER — ONDANSETRON 4 MG/1
4 TABLET, FILM COATED ORAL EVERY 8 HOURS PRN
COMMUNITY
End: 2017-10-30

## 2017-05-12 ENCOUNTER — APPOINTMENT (OUTPATIENT)
Dept: LAB | Facility: HOSPITAL | Age: 52
End: 2017-05-12
Attending: INTERNAL MEDICINE

## 2017-05-12 LAB
ALBUMIN SERPL-MCNC: 4.1 G/DL (ref 3.5–5)
ALBUMIN/GLOB SERPL: 1.1 G/DL (ref 1–2)
ALP SERPL-CCNC: 101 U/L (ref 38–126)
ALT SERPL W P-5'-P-CCNC: 43 U/L (ref 13–69)
ANION GAP SERPL CALCULATED.3IONS-SCNC: 15.2 MMOL/L
AST SERPL-CCNC: 29 U/L (ref 15–46)
BASOPHILS # BLD AUTO: 0.02 10*3/MM3 (ref 0–0.2)
BASOPHILS NFR BLD AUTO: 0.3 % (ref 0–2.5)
BILIRUB SERPL-MCNC: 0.8 MG/DL (ref 0.2–1.3)
BUN BLD-MCNC: 11 MG/DL (ref 7–20)
BUN/CREAT SERPL: 13.8 (ref 7.1–23.5)
CALCIUM SPEC-SCNC: 9.6 MG/DL (ref 8.4–10.2)
CHLORIDE SERPL-SCNC: 106 MMOL/L (ref 98–107)
CO2 SERPL-SCNC: 25 MMOL/L (ref 26–30)
CREAT BLD-MCNC: 0.8 MG/DL (ref 0.6–1.3)
DEPRECATED RDW RBC AUTO: 45.1 FL (ref 37–54)
EOSINOPHIL # BLD AUTO: 0.35 10*3/MM3 (ref 0–0.7)
EOSINOPHIL NFR BLD AUTO: 6 % (ref 0–7)
ERYTHROCYTE [DISTWIDTH] IN BLOOD BY AUTOMATED COUNT: 13.2 % (ref 11.5–14.5)
GFR SERPL CREATININE-BSD FRML MDRD: 76 ML/MIN/1.73
GLOBULIN UR ELPH-MCNC: 3.6 GM/DL
GLUCOSE BLD-MCNC: 113 MG/DL (ref 74–98)
HCT VFR BLD AUTO: 44 % (ref 37–47)
HGB BLD-MCNC: 14.9 G/DL (ref 12–16)
IMM GRANULOCYTES # BLD: 0.03 10*3/MM3 (ref 0–0.06)
IMM GRANULOCYTES NFR BLD: 0.5 % (ref 0–0.6)
LIPASE SERPL-CCNC: 103 U/L (ref 23–300)
LYMPHOCYTES # BLD AUTO: 2.46 10*3/MM3 (ref 0.6–3.4)
LYMPHOCYTES NFR BLD AUTO: 41.9 % (ref 10–50)
MCH RBC QN AUTO: 32 PG (ref 27–31)
MCHC RBC AUTO-ENTMCNC: 33.9 G/DL (ref 30–37)
MCV RBC AUTO: 94.4 FL (ref 81–99)
MONOCYTES # BLD AUTO: 0.47 10*3/MM3 (ref 0–0.9)
MONOCYTES NFR BLD AUTO: 8 % (ref 0–12)
NEUTROPHILS # BLD AUTO: 2.54 10*3/MM3 (ref 2–6.9)
NEUTROPHILS NFR BLD AUTO: 43.3 % (ref 37–80)
NRBC BLD MANUAL-RTO: 0 /100 WBC (ref 0–0)
PLATELET # BLD AUTO: 160 10*3/MM3 (ref 130–400)
PMV BLD AUTO: 10.7 FL (ref 6–12)
POTASSIUM BLD-SCNC: 4.2 MMOL/L (ref 3.5–5.1)
PROT SERPL-MCNC: 7.7 G/DL (ref 6.3–8.2)
RBC # BLD AUTO: 4.66 10*6/MM3 (ref 4.2–5.4)
SODIUM BLD-SCNC: 142 MMOL/L (ref 137–145)
WBC NRBC COR # BLD: 5.87 10*3/MM3 (ref 4.8–10.8)

## 2017-05-12 PROCEDURE — 85025 COMPLETE CBC W/AUTO DIFF WBC: CPT | Performed by: INTERNAL MEDICINE

## 2017-05-12 PROCEDURE — 80053 COMPREHEN METABOLIC PANEL: CPT | Performed by: INTERNAL MEDICINE

## 2017-05-12 PROCEDURE — 83690 ASSAY OF LIPASE: CPT | Performed by: INTERNAL MEDICINE

## 2017-05-22 DIAGNOSIS — K65.1 RIGHT UPPER QUADRANT ABSCESS (HCC): Primary | ICD-10-CM

## 2017-05-25 ENCOUNTER — HOSPITAL ENCOUNTER (OUTPATIENT)
Dept: CT IMAGING | Facility: HOSPITAL | Age: 52
Discharge: HOME | End: 2017-05-25
Admitting: NURSE PRACTITIONER

## 2017-05-25 DIAGNOSIS — K65.1 RIGHT UPPER QUADRANT ABSCESS (HCC): ICD-10-CM

## 2017-05-25 PROCEDURE — 0 DIATRIZOATE MEGLUMINE & SODIUM PER 1 ML: Performed by: NURSE PRACTITIONER

## 2017-05-25 PROCEDURE — 0 IOPAMIDOL 61 % SOLUTION: Performed by: NURSE PRACTITIONER

## 2017-05-25 PROCEDURE — 74160 CT ABDOMEN W/CONTRAST: CPT

## 2017-05-25 RX ADMIN — IOPAMIDOL 100 ML: 612 INJECTION, SOLUTION INTRAVENOUS at 11:00

## 2017-05-25 RX ADMIN — DIATRIZOATE MEGLUMINE AND DIATRIZOATE SODIUM 30 ML: 660; 100 LIQUID ORAL; RECTAL at 11:00

## 2017-10-30 ENCOUNTER — OFFICE VISIT (OUTPATIENT)
Dept: GASTROENTEROLOGY | Facility: CLINIC | Age: 52
End: 2017-10-30

## 2017-10-30 VITALS
BODY MASS INDEX: 50.02 KG/M2 | HEIGHT: 64 IN | SYSTOLIC BLOOD PRESSURE: 155 MMHG | TEMPERATURE: 98.4 F | DIASTOLIC BLOOD PRESSURE: 98 MMHG | WEIGHT: 293 LBS | HEART RATE: 102 BPM | RESPIRATION RATE: 16 BRPM

## 2017-10-30 DIAGNOSIS — K83.8 DILATED CBD, ACQUIRED: ICD-10-CM

## 2017-10-30 DIAGNOSIS — E66.3 OVER WEIGHT: ICD-10-CM

## 2017-10-30 DIAGNOSIS — K80.50 CHOLEDOCHOLITHIASIS: Primary | ICD-10-CM

## 2017-10-30 DIAGNOSIS — K83.1 OBSTRUCTIVE JAUNDICE: ICD-10-CM

## 2017-10-30 PROCEDURE — 99214 OFFICE O/P EST MOD 30 MIN: CPT | Performed by: INTERNAL MEDICINE

## 2017-11-21 ENCOUNTER — PREP FOR SURGERY (OUTPATIENT)
Dept: OTHER | Facility: HOSPITAL | Age: 52
End: 2017-11-21

## 2017-11-21 DIAGNOSIS — K83.8 DILATED CBD, ACQUIRED: ICD-10-CM

## 2017-11-21 DIAGNOSIS — K80.50 CHOLEDOCHOLITHIASIS: Primary | ICD-10-CM

## 2017-11-21 DIAGNOSIS — K83.1 OBSTRUCTIVE JAUNDICE: ICD-10-CM

## 2017-11-21 RX ORDER — SODIUM CHLORIDE 9 MG/ML
70 INJECTION, SOLUTION INTRAVENOUS CONTINUOUS PRN
Status: CANCELLED | OUTPATIENT
Start: 2017-11-21

## 2017-11-22 PROBLEM — K83.8 DILATED CBD, ACQUIRED: Status: ACTIVE | Noted: 2017-11-22

## 2017-11-27 ENCOUNTER — APPOINTMENT (OUTPATIENT)
Dept: PREADMISSION TESTING | Facility: HOSPITAL | Age: 52
End: 2017-11-27

## 2017-11-27 ENCOUNTER — HOSPITAL ENCOUNTER (OUTPATIENT)
Dept: GENERAL RADIOLOGY | Facility: HOSPITAL | Age: 52
Discharge: HOME | End: 2017-11-27
Admitting: NURSE PRACTITIONER

## 2017-11-27 VITALS
HEIGHT: 64 IN | SYSTOLIC BLOOD PRESSURE: 158 MMHG | WEIGHT: 293 LBS | BODY MASS INDEX: 50.02 KG/M2 | DIASTOLIC BLOOD PRESSURE: 80 MMHG

## 2017-11-27 DIAGNOSIS — K83.8 DILATED CBD, ACQUIRED: ICD-10-CM

## 2017-11-27 DIAGNOSIS — K83.1 OBSTRUCTIVE JAUNDICE: ICD-10-CM

## 2017-11-27 DIAGNOSIS — K80.50 CHOLEDOCHOLITHIASIS: ICD-10-CM

## 2017-11-27 LAB
ALBUMIN SERPL-MCNC: 4.1 G/DL (ref 3.5–5)
ALBUMIN/GLOB SERPL: 1.5 G/DL (ref 1–2)
ALP SERPL-CCNC: 91 U/L (ref 38–126)
ALT SERPL W P-5'-P-CCNC: 53 U/L (ref 13–69)
ANION GAP SERPL CALCULATED.3IONS-SCNC: 13.1 MMOL/L
APTT PPP: 26.6 SECONDS (ref 25–36)
AST SERPL-CCNC: 24 U/L (ref 15–46)
BILIRUB SERPL-MCNC: 0.4 MG/DL (ref 0.2–1.3)
BUN BLD-MCNC: 12 MG/DL (ref 7–20)
BUN/CREAT SERPL: 15 (ref 7.1–23.5)
CALCIUM SPEC-SCNC: 9.3 MG/DL (ref 8.4–10.2)
CHLORIDE SERPL-SCNC: 107 MMOL/L (ref 98–107)
CO2 SERPL-SCNC: 24 MMOL/L (ref 26–30)
CREAT BLD-MCNC: 0.8 MG/DL (ref 0.6–1.3)
DEPRECATED RDW RBC AUTO: 43 FL (ref 37–54)
ERYTHROCYTE [DISTWIDTH] IN BLOOD BY AUTOMATED COUNT: 12.3 % (ref 11.5–14.5)
GFR SERPL CREATININE-BSD FRML MDRD: 76 ML/MIN/1.73
GLOBULIN UR ELPH-MCNC: 2.8 GM/DL
GLUCOSE BLD-MCNC: 125 MG/DL (ref 74–98)
HCT VFR BLD AUTO: 43.5 % (ref 37–47)
HGB BLD-MCNC: 14.8 G/DL (ref 12–16)
INR PPP: 1 (ref 0.9–1.1)
MCH RBC QN AUTO: 32.7 PG (ref 27–31)
MCHC RBC AUTO-ENTMCNC: 34 G/DL (ref 30–37)
MCV RBC AUTO: 96 FL (ref 81–99)
PLATELET # BLD AUTO: 160 10*3/MM3 (ref 130–400)
PMV BLD AUTO: 10.7 FL (ref 6–12)
POTASSIUM BLD-SCNC: 4.1 MMOL/L (ref 3.5–5.1)
PROT SERPL-MCNC: 6.9 G/DL (ref 6.3–8.2)
PROTHROMBIN TIME: 10.9 SECONDS (ref 9.3–12.1)
RBC # BLD AUTO: 4.53 10*6/MM3 (ref 4.2–5.4)
SODIUM BLD-SCNC: 140 MMOL/L (ref 137–145)
WBC NRBC COR # BLD: 6.65 10*3/MM3 (ref 4.8–10.8)

## 2017-11-29 ENCOUNTER — ANESTHESIA EVENT (OUTPATIENT)
Dept: PERIOP | Facility: HOSPITAL | Age: 52
End: 2017-11-29

## 2017-11-30 ENCOUNTER — ANESTHESIA (OUTPATIENT)
Dept: PERIOP | Facility: HOSPITAL | Age: 52
End: 2017-11-30

## 2017-11-30 ENCOUNTER — HOSPITAL ENCOUNTER (OUTPATIENT)
Facility: HOSPITAL | Age: 52
Setting detail: OBSERVATION
Discharge: HOME | End: 2017-12-01
Attending: INTERNAL MEDICINE | Admitting: INTERNAL MEDICINE

## 2017-11-30 DIAGNOSIS — K83.8 DILATED CBD, ACQUIRED: ICD-10-CM

## 2017-11-30 DIAGNOSIS — K80.50 CHOLEDOCHOLITHIASIS: ICD-10-CM

## 2017-11-30 DIAGNOSIS — K83.1 OBSTRUCTIVE JAUNDICE: ICD-10-CM

## 2017-11-30 LAB
B-HCG UR QL: NEGATIVE
GLUCOSE BLDC GLUCOMTR-MCNC: 108 MG/DL (ref 70–130)

## 2017-11-30 PROCEDURE — 81025 URINE PREGNANCY TEST: CPT | Performed by: INTERNAL MEDICINE

## 2017-11-30 PROCEDURE — 43275 ERCP REMOVE FORGN BODY DUCT: CPT | Performed by: INTERNAL MEDICINE

## 2017-11-30 PROCEDURE — 25010000002 ONDANSETRON PER 1 MG

## 2017-11-30 PROCEDURE — 43264 ERCP REMOVE DUCT CALCULI: CPT | Performed by: INTERNAL MEDICINE

## 2017-11-30 PROCEDURE — 25010000002 PROPOFOL 200 MG/20ML EMULSION: Performed by: NURSE ANESTHETIST, CERTIFIED REGISTERED

## 2017-11-30 PROCEDURE — 25010000002 PIPERACILLIN SOD-TAZOBACTAM PER 1 G: Performed by: INTERNAL MEDICINE

## 2017-11-30 PROCEDURE — S0260 H&P FOR SURGERY: HCPCS | Performed by: INTERNAL MEDICINE

## 2017-11-30 PROCEDURE — C1769 GUIDE WIRE: HCPCS | Performed by: INTERNAL MEDICINE

## 2017-11-30 PROCEDURE — 25010000002 FENTANYL CITRATE (PF) 250 MCG/5ML SOLUTION: Performed by: NURSE ANESTHETIST, CERTIFIED REGISTERED

## 2017-11-30 PROCEDURE — 25010000002 MORPHINE PER 10 MG: Performed by: INTERNAL MEDICINE

## 2017-11-30 PROCEDURE — 94799 UNLISTED PULMONARY SVC/PX: CPT

## 2017-11-30 PROCEDURE — 82962 GLUCOSE BLOOD TEST: CPT

## 2017-11-30 PROCEDURE — 74328 X-RAY BILE DUCT ENDOSCOPY: CPT | Performed by: INTERNAL MEDICINE

## 2017-11-30 PROCEDURE — 25010000002 ONDANSETRON PER 1 MG: Performed by: NURSE ANESTHETIST, CERTIFIED REGISTERED

## 2017-11-30 PROCEDURE — 25010000002 ENOXAPARIN PER 10 MG: Performed by: INTERNAL MEDICINE

## 2017-11-30 PROCEDURE — 25010000002 HYDROMORPHONE PER 4 MG: Performed by: NURSE ANESTHETIST, CERTIFIED REGISTERED

## 2017-11-30 PROCEDURE — 25010000002 SUCCINYLCHOLINE PER 20 MG: Performed by: NURSE ANESTHETIST, CERTIFIED REGISTERED

## 2017-11-30 PROCEDURE — 25010000002 DEXAMETHASONE PER 1 MG: Performed by: NURSE ANESTHETIST, CERTIFIED REGISTERED

## 2017-11-30 PROCEDURE — 0 IOPAMIDOL 61 % SOLUTION: Performed by: INTERNAL MEDICINE

## 2017-11-30 PROCEDURE — 25010000002 MIDAZOLAM PER 1 MG: Performed by: NURSE ANESTHETIST, CERTIFIED REGISTERED

## 2017-11-30 PROCEDURE — G0378 HOSPITAL OBSERVATION PER HR: HCPCS

## 2017-11-30 RX ORDER — NEOSTIGMINE METHYLSULFATE 5 MG/5 ML
SYRINGE (ML) INTRAVENOUS AS NEEDED
Status: DISCONTINUED | OUTPATIENT
Start: 2017-11-30 | End: 2017-11-30 | Stop reason: SURG

## 2017-11-30 RX ORDER — HYDROMORPHONE HCL 110MG/55ML
PATIENT CONTROLLED ANALGESIA SYRINGE INTRAVENOUS AS NEEDED
Status: DISCONTINUED | OUTPATIENT
Start: 2017-11-30 | End: 2017-11-30 | Stop reason: SURG

## 2017-11-30 RX ORDER — NALOXONE HCL 0.4 MG/ML
0.4 VIAL (ML) INJECTION
Status: DISCONTINUED | OUTPATIENT
Start: 2017-11-30 | End: 2017-12-01 | Stop reason: HOSPADM

## 2017-11-30 RX ORDER — SODIUM CHLORIDE 9 MG/ML
70 INJECTION, SOLUTION INTRAVENOUS CONTINUOUS PRN
Status: DISCONTINUED | OUTPATIENT
Start: 2017-11-30 | End: 2017-11-30 | Stop reason: HOSPADM

## 2017-11-30 RX ORDER — ONDANSETRON 2 MG/ML
4 INJECTION INTRAMUSCULAR; INTRAVENOUS EVERY 6 HOURS PRN
Status: DISCONTINUED | OUTPATIENT
Start: 2017-11-30 | End: 2017-12-01 | Stop reason: HOSPADM

## 2017-11-30 RX ORDER — SUCCINYLCHOLINE CHLORIDE 20 MG/ML
INJECTION INTRAMUSCULAR; INTRAVENOUS AS NEEDED
Status: DISCONTINUED | OUTPATIENT
Start: 2017-11-30 | End: 2017-11-30 | Stop reason: SURG

## 2017-11-30 RX ORDER — ONDANSETRON 4 MG/1
4 TABLET, ORALLY DISINTEGRATING ORAL EVERY 6 HOURS PRN
Status: DISCONTINUED | OUTPATIENT
Start: 2017-11-30 | End: 2017-12-01 | Stop reason: HOSPADM

## 2017-11-30 RX ORDER — DEXTROSE, SODIUM CHLORIDE, AND POTASSIUM CHLORIDE 5; .45; .15 G/100ML; G/100ML; G/100ML
80 INJECTION INTRAVENOUS CONTINUOUS
Status: DISCONTINUED | OUTPATIENT
Start: 2017-11-30 | End: 2017-12-01 | Stop reason: HOSPADM

## 2017-11-30 RX ORDER — ONDANSETRON 2 MG/ML
4 INJECTION INTRAMUSCULAR; INTRAVENOUS ONCE
Status: COMPLETED | OUTPATIENT
Start: 2017-11-30 | End: 2017-11-30

## 2017-11-30 RX ORDER — PROPOFOL 10 MG/ML
INJECTION, EMULSION INTRAVENOUS AS NEEDED
Status: DISCONTINUED | OUTPATIENT
Start: 2017-11-30 | End: 2017-11-30 | Stop reason: SURG

## 2017-11-30 RX ORDER — GLYCOPYRROLATE 0.2 MG/ML
INJECTION INTRAMUSCULAR; INTRAVENOUS AS NEEDED
Status: DISCONTINUED | OUTPATIENT
Start: 2017-11-30 | End: 2017-11-30 | Stop reason: SURG

## 2017-11-30 RX ORDER — ROCURONIUM BROMIDE 10 MG/ML
INJECTION, SOLUTION INTRAVENOUS AS NEEDED
Status: DISCONTINUED | OUTPATIENT
Start: 2017-11-30 | End: 2017-11-30 | Stop reason: SURG

## 2017-11-30 RX ORDER — DEXAMETHASONE SODIUM PHOSPHATE 4 MG/ML
INJECTION, SOLUTION INTRA-ARTICULAR; INTRALESIONAL; INTRAMUSCULAR; INTRAVENOUS; SOFT TISSUE AS NEEDED
Status: DISCONTINUED | OUTPATIENT
Start: 2017-11-30 | End: 2017-11-30 | Stop reason: SURG

## 2017-11-30 RX ORDER — MEPERIDINE HYDROCHLORIDE 25 MG/ML
25 INJECTION INTRAMUSCULAR; INTRAVENOUS; SUBCUTANEOUS
Status: DISCONTINUED | OUTPATIENT
Start: 2017-11-30 | End: 2017-11-30 | Stop reason: HOSPADM

## 2017-11-30 RX ORDER — ONDANSETRON 2 MG/ML
INJECTION INTRAMUSCULAR; INTRAVENOUS
Status: COMPLETED
Start: 2017-11-30 | End: 2017-11-30

## 2017-11-30 RX ORDER — ONDANSETRON 4 MG/1
4 TABLET, FILM COATED ORAL EVERY 6 HOURS PRN
Status: DISCONTINUED | OUTPATIENT
Start: 2017-11-30 | End: 2017-12-01 | Stop reason: HOSPADM

## 2017-11-30 RX ORDER — PROMETHAZINE HYDROCHLORIDE 25 MG/ML
6.25 INJECTION, SOLUTION INTRAMUSCULAR; INTRAVENOUS ONCE
Status: DISCONTINUED | OUTPATIENT
Start: 2017-11-30 | End: 2017-11-30 | Stop reason: HOSPADM

## 2017-11-30 RX ORDER — IPRATROPIUM BROMIDE AND ALBUTEROL SULFATE 2.5; .5 MG/3ML; MG/3ML
3 SOLUTION RESPIRATORY (INHALATION) ONCE
Status: DISCONTINUED | OUTPATIENT
Start: 2017-11-30 | End: 2017-11-30 | Stop reason: HOSPADM

## 2017-11-30 RX ORDER — MIDAZOLAM HYDROCHLORIDE 1 MG/ML
INJECTION INTRAMUSCULAR; INTRAVENOUS AS NEEDED
Status: DISCONTINUED | OUTPATIENT
Start: 2017-11-30 | End: 2017-11-30 | Stop reason: SURG

## 2017-11-30 RX ORDER — FENTANYL CITRATE 50 UG/ML
INJECTION, SOLUTION INTRAMUSCULAR; INTRAVENOUS AS NEEDED
Status: DISCONTINUED | OUTPATIENT
Start: 2017-11-30 | End: 2017-11-30 | Stop reason: SURG

## 2017-11-30 RX ORDER — MORPHINE SULFATE 2 MG/ML
4 INJECTION, SOLUTION INTRAMUSCULAR; INTRAVENOUS
Status: DISCONTINUED | OUTPATIENT
Start: 2017-11-30 | End: 2017-12-01 | Stop reason: HOSPADM

## 2017-11-30 RX ORDER — ONDANSETRON 2 MG/ML
INJECTION INTRAMUSCULAR; INTRAVENOUS AS NEEDED
Status: DISCONTINUED | OUTPATIENT
Start: 2017-11-30 | End: 2017-11-30 | Stop reason: SURG

## 2017-11-30 RX ADMIN — ROCURONIUM BROMIDE 20 MG: 10 INJECTION INTRAVENOUS at 07:34

## 2017-11-30 RX ADMIN — SODIUM CHLORIDE: 9 INJECTION, SOLUTION INTRAVENOUS at 07:55

## 2017-11-30 RX ADMIN — FENTANYL CITRATE 100 MCG: 50 INJECTION, SOLUTION INTRAMUSCULAR; INTRAVENOUS at 07:27

## 2017-11-30 RX ADMIN — PROPOFOL 200 MG: 10 INJECTION, EMULSION INTRAVENOUS at 07:27

## 2017-11-30 RX ADMIN — HYDROMORPHONE HYDROCHLORIDE 0.5 MG: 2 INJECTION, SOLUTION INTRAMUSCULAR; INTRAVENOUS; SUBCUTANEOUS at 07:57

## 2017-11-30 RX ADMIN — ONDANSETRON 4 MG: 2 INJECTION INTRAMUSCULAR; INTRAVENOUS at 07:43

## 2017-11-30 RX ADMIN — DEXAMETHASONE SODIUM PHOSPHATE 4 MG: 4 INJECTION, SOLUTION INTRAMUSCULAR; INTRAVENOUS at 07:43

## 2017-11-30 RX ADMIN — DEXTROSE MONOHYDRATE, SODIUM CHLORIDE, AND POTASSIUM CHLORIDE 80 ML/HR: 50; 4.5; 1.49 INJECTION, SOLUTION INTRAVENOUS at 15:45

## 2017-11-30 RX ADMIN — ENOXAPARIN SODIUM 40 MG: 40 INJECTION SUBCUTANEOUS at 12:20

## 2017-11-30 RX ADMIN — TAZOBACTAM SODIUM AND PIPERACILLIN SODIUM 3.38 G: 375; 3 INJECTION, SOLUTION INTRAVENOUS at 06:27

## 2017-11-30 RX ADMIN — SODIUM CHLORIDE 70 ML/HR: 9 INJECTION, SOLUTION INTRAVENOUS at 06:27

## 2017-11-30 RX ADMIN — GLYCOPYRROLATE 0.2 MG: 0.2 INJECTION, SOLUTION INTRAMUSCULAR; INTRAVENOUS at 07:14

## 2017-11-30 RX ADMIN — MORPHINE SULFATE 4 MG: 2 INJECTION, SOLUTION INTRAMUSCULAR; INTRAVENOUS at 21:26

## 2017-11-30 RX ADMIN — LIDOCAINE HYDROCHLORIDE 60 MG: 20 INJECTION, SOLUTION INTRAVENOUS at 07:27

## 2017-11-30 RX ADMIN — GLYCOPYRROLATE 0.6 MG: 0.2 INJECTION, SOLUTION INTRAMUSCULAR; INTRAVENOUS at 08:18

## 2017-11-30 RX ADMIN — FENTANYL CITRATE 100 MCG: 50 INJECTION, SOLUTION INTRAMUSCULAR; INTRAVENOUS at 07:15

## 2017-11-30 RX ADMIN — ROCURONIUM BROMIDE 10 MG: 10 INJECTION INTRAVENOUS at 07:59

## 2017-11-30 RX ADMIN — Medication 3 MG: at 08:18

## 2017-11-30 RX ADMIN — ROCURONIUM BROMIDE 5 MG: 10 INJECTION INTRAVENOUS at 07:27

## 2017-11-30 RX ADMIN — ONDANSETRON 4 MG: 2 INJECTION INTRAMUSCULAR; INTRAVENOUS at 09:10

## 2017-11-30 RX ADMIN — SUCCINYLCHOLINE CHLORIDE 160 MG: 20 INJECTION, SOLUTION INTRAMUSCULAR; INTRAVENOUS at 07:27

## 2017-11-30 RX ADMIN — MIDAZOLAM HYDROCHLORIDE 2 MG: 1 INJECTION, SOLUTION INTRAMUSCULAR; INTRAVENOUS at 07:10

## 2017-11-30 NOTE — ANESTHESIA POSTPROCEDURE EVALUATION
Patient: Lea Thompson    Procedure Summary     Date Anesthesia Start Anesthesia Stop Room / Location    11/30/17 0712 0838  [facility]  [facility]       Procedure Diagnosis Surgeon Provider    ENDOSCOPIC RETROGRADE CHOLANGIOPANCREATOGRAPHY with stent removal and balloon sweep (N/A ) Choledocholithiasis; Dilated cbd, acquired; Bile duct stone  (Choledocholithiasis [K80.50]; Dilated cbd, acquired [K83.8]; Obstructive jaundice [K83.8]) MD Kin Stanley CRNA          Anesthesia Type: general  Last vitals  BP   112/74   Temp 98.1   Pulse   87   Resp    23   SpO2 99     Post Anesthesia Care and Evaluation    Patient location during evaluation: PACU  Patient participation: complete - patient participated  Level of consciousness: awake and awake and alert  Pain score: 0  Pain management: adequate  Airway patency: patent  Anesthetic complications: No anesthetic complications  PONV Status: none  Cardiovascular status: acceptable  Respiratory status: acceptable, face mask, spontaneous ventilation and nonlabored ventilation  Hydration status: acceptable

## 2017-11-30 NOTE — ANESTHESIA PREPROCEDURE EVALUATION
Anesthesia Evaluation     Patient summary reviewed and Nursing notes reviewed   history of anesthetic complications: PONV  NPO Solid Status: > 8 hours  NPO Liquid Status: > 8 hours     Airway   Mallampati: II  TM distance: >3 FB  Neck ROM: full  possible difficult intubation  Dental - normal exam     Pulmonary     breath sounds clear to auscultation  (+) a smoker (7.5 Pack years) Current Abstained day of surgery, COPD mild, shortness of breath, sleep apnea on CPAP, decreased breath sounds,     ROS comment: Smokes 1.5 packs per day. Had quit for 8 years and started back 2 years ago  Cardiovascular - normal exam  Exercise tolerance: poor (<4 METS)    ECG reviewed  Rhythm: regular  Rate: normal    (+) hypertension ( no meds) poorly controlled, DEJESUS, PVD ( le edema at times le edema at times),       Neuro/Psych- negative ROS  GI/Hepatic/Renal/Endo    (+) obesity, morbid obesity, GERD, diabetes mellitus ( no meds) type 2,     ROS Comment: occassional indegestion when eats mexican food    Musculoskeletal     (+) arthralgias, myalgias,   Abdominal   (+) obese,     Abdomen: tender.   Substance History   (+) alcohol use,   (-) drug use      Comment: Rare social glass of wine   OB/GYN negative ob/gyn ROS         Other - negative ROS       ROS/Med Hx Other: EKG was normal, sr 80  Pt non compliant with lifestyle changes  cxr nad  14.8 / 43.5   K 4.1                            Anesthesia Plan    ASA 3     general   Rapid sequence(Risks and benefits of general anesthesia discussed including risk of aspiration, recall and dental damage. All patient questions answered.  Patient told a breathing tube will be used to manage the airway. Will apply cricoid pressure during induction and intubation.    Will continue with plan of care.     Intubated with Glidescope during prior ERCP and 3 Mac DV with Cholecystectomy.)  intravenous induction   Anesthetic plan and risks discussed with patient.

## 2017-11-30 NOTE — ANESTHESIA PROCEDURE NOTES
Airway  Urgency: elective    Airway not difficult    General Information and Staff    Patient location during procedure: OR  CRNA: VAN MEDRANO    Indications and Patient Condition  Indications for airway management: airway protection    Preoxygenated: yes      Final Airway Details  Final airway type: endotracheal airway      Successful airway: ETT  Cuffed: yes   Successful intubation technique: direct laryngoscopy  Facilitating devices/methods: intubating stylet and cricoid pressure  Endotracheal tube insertion site: oral  Blade: Rashad  Blade size: #3  ETT size: 7.0 mm  Cormack-Lehane Classification: grade I - full view of glottis  Placement verified by: chest auscultation, capnometry and palpation of cuff   Measured from: lips  ETT to lips (cm): 21  Number of attempts at approach: 1    Additional Comments  RSI with adequate Cricoid pressure applied. Dentition and Lips as pre-induction. ETT cuff inflated to minimal occlusive pressure.

## 2017-12-01 VITALS
HEART RATE: 78 BPM | OXYGEN SATURATION: 98 % | RESPIRATION RATE: 18 BRPM | WEIGHT: 293 LBS | TEMPERATURE: 97.7 F | DIASTOLIC BLOOD PRESSURE: 58 MMHG | SYSTOLIC BLOOD PRESSURE: 115 MMHG | BODY MASS INDEX: 48.82 KG/M2 | HEIGHT: 65 IN

## 2017-12-01 PROCEDURE — 25010000002 ENOXAPARIN PER 10 MG: Performed by: INTERNAL MEDICINE

## 2017-12-01 PROCEDURE — G0378 HOSPITAL OBSERVATION PER HR: HCPCS

## 2017-12-01 RX ADMIN — DEXTROSE MONOHYDRATE, SODIUM CHLORIDE, AND POTASSIUM CHLORIDE 80 ML/HR: 50; 4.5; 1.49 INJECTION, SOLUTION INTRAVENOUS at 03:40

## 2017-12-01 RX ADMIN — ENOXAPARIN SODIUM 40 MG: 40 INJECTION SUBCUTANEOUS at 11:57

## 2017-12-01 NOTE — PROGRESS NOTES
Discharge Planning Assessment   Jose     Patient Name: Lea Thompson  MRN: [id number]  Today's Date: 12/1/2017    Admit Date: 11/30/2017          Discharge Needs Assessment       12/01/17 1102    Living Environment    Lives With spouse    Living Arrangements house    Home Accessibility no concerns    Stair Railings at Home none    Transportation Available car;family or friend will provide    Living Environment    Quality Of Family Relationships supportive    Discharge Needs Assessment    Equipment Currently Used at Home bipap/ cpap    Equipment Needed After Discharge none            Discharge Plan       12/01/17 1103    Case Management/Social Work Plan    Plan home, self care    Additional Comments lives wtih spouse,  pt works first shift, pt drives, pt independent of ADL's, pt has Cpap at night obtianed from somewhere in [facility],  no oxygen  no DME, no HH plans on returning home at discharge        Discharge Placement     No information found                Demographic Summary       12/01/17 1101    Referral Information    Admission Type observation    Reason For Consult discharge planning    Primary Care Physician Information    Name VICKEY Salazar    Phone [phone]            Functional Status       12/01/17 1102    Functional Status Prior    Ambulation 0-->independent    Transferring 0-->independent    Toileting 0-->independent    Bathing 0-->independent    Dressing 0-->independent    Eating 0-->independent    Communication 0-->understands/communicates without difficulty    Swallowing 0-->swallows foods/liquids without difficulty    IADL    Medications independent    Meal Preparation independent    Housekeeping independent    Laundry independent    Shopping independent    Oral Care independent    Activity Tolerance    Current Activity Tolerance excellent    Cognitive/Perceptual/Developmental    Current Mental Status/Cognitive Functioning no deficits noted    Employment/Financial    Shift Worked first  shift    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

## 2017-12-01 NOTE — PROGRESS NOTES
D/C homeCase Management Discharge Note         Discharge Placement     No information found        Other:  (POV)    Discharge Codes: 01  Discharge to home

## 2017-12-01 NOTE — PROGRESS NOTES
"Patient: Lea Thompson  Procedure(s):  ENDOSCOPIC RETROGRADE CHOLANGIOPANCREATOGRAPHY with stent removal and balloon sweep  Anesthesia type: [unfilled]    Patient location: [facility] Floor  Last vitals: /58  Pulse 78  Temp 97.7 °F (36.5 °C)  Resp 18  Ht 64.5\" (163.8 cm)  Wt (!) 317 lb 12.8 oz (144 kg)  LMP 04/28/2016  SpO2 98%  BMI 53.71 kg/m2  Level of consciousness: awake, alert and oriented    Post-anesthesia pain: adequate analgesia  Airway patency: patent  Respiratory: unassisted  Cardiovascular: stable and blood pressure at baseline  Hydration: euvolemic    Anesthetic complications: no  "

## 2017-12-01 NOTE — PLAN OF CARE
Problem: Patient Care Overview (Adult)  Goal: Plan of Care Review  Outcome: Outcome(s) achieved Date Met:  12/01/17  Goal: Adult Individualization and Mutuality  Outcome: Outcome(s) achieved Date Met:  12/01/17  Goal: Discharge Needs Assessment  Outcome: Outcome(s) achieved Date Met:  12/01/17    Problem: GI Endoscopy (Adult)  Goal: Signs and Symptoms of Listed Potential Problems Will be Absent or Manageable (GI Endoscopy)  Outcome: Outcome(s) achieved Date Met:  12/01/17    Problem: Perioperative Period (Adult)  Goal: Signs and Symptoms of Listed Potential Problems Will be Absent or Manageable (Perioperative Period)  Outcome: Outcome(s) achieved Date Met:  12/01/17

## 2017-12-01 NOTE — PLAN OF CARE
Problem: Patient Care Overview (Adult)  Goal: Plan of Care Review  Outcome: Ongoing (interventions implemented as appropriate)    12/01/17 0517   Coping/Psychosocial Response Interventions   Plan Of Care Reviewed With patient   Patient Care Overview   Progress improving

## 2017-12-01 NOTE — DISCHARGE SUMMARY
"DISCHARGE SUMMARY.    DATE OF ADMISSION: November 30, 2017.  DATE OF DISCHARGE: December 1, 2017.    DISCHARGE DIAGNOSES:  1.  Choledocholithiasis.  Resolved.  2.  Dilated common bile duct.  3.  The patient is overweight.      PROCEDURES:  Dated November 30, 2017 ERCP, removal of a biliary stent, multiple balloon sweeps and removal of multiple common duct stones.      Consultants:  None.    History and hospital course:  The patient has history of obstructive jaundice secondary to common duct stones.  She had undergone an ERCP with placement of a biliary stent in the past.  Later the patient had undergone laparoscopic cholecystectomy.  On 11/30/2017 she underwent an elective ERCP with removal of stent and stones.  Post-procedurally the patient was admitted for observation.  She did fine.  The patient was started on clear liquids which was then advanced to low fat diet which he tolerated well.    On the day of discharge:    SUBJECTIVE: The patient feels okay.   There is no overt GI bleed (hematemesis melena or hematochezia).  No nausea or vomiting.  There is no diarrhea or constipation.  There is no abdominal pain.  There is no  of reflux.  There is no dysphagia or odynophagia.  The patient ate low-fat diet and tolerated.    UNDERLYING CHRONIC ILLNESS:      REVIEW OF SYSTEMS:   Constitutional: No fever or chills.  CNS: No seizure or stroke.  Cardiovascular: No chest pains, no palpitations.  Pulmonary: No shortness of breath.  No cough.  Liver: No jaundice.  Rheumatologic: No specific joint symptoms.  Skin: No rash.  Renal: The patient is making urine.  No hematuria or dysuria.  Psychiatric : Denies depression or anxiety.  Nutrition: Low-fat diet  Activities: Has used bathroom.  The patient has also walked in the hallway.    OBJECTIVE:  Alert and oriented × 3.  No apparent distress.    Vital signs:    Blood pressure 115/58, pulse 78, temperature 97.7 °F (36.5 °C), resp. rate 18, height 64.5\" (163.8 cm), weight (!) 317 " lb 12.8 oz (144 kg), last menstrual period 04/28/2016, SpO2 98 %.    HEENT: Normal.  No jaundice.  No pallor.  Neck: Supple.  No JVD.  No thyromegaly.  No lymph nodes.  Chest: Clear to auscultation.  Cardiovascular: S1 and S2 okay.  No S3.  No murmurs.  Abdomen: Inspection: Nondistended.  Palpation: Soft.  No mass  was palpable.  Tenderness: Nontender. Percussion: No clinical ascites.  Auscultation: Good bowel sounds.  Extremities: No edema.  No cyanosis.  No clubbing.  Rectal: Deferred.  Stigmata of chronic liver disease: None.  Neurological: No focal neurological deficit.  Rheumatologic: No obvious joint swelling.    DIET: Low-fat.    ACTIVITIES: As tolerated.    DISCHARGE MEDICATIONS:   None.    DISCONTINUED MEDICATIONS:  None.    NEW MEDICATIONS STARTED:  None    DISPOSITION: The patient will be discharged home in stable condition.    FOLLOW-UP: 4-6 weeks.  It is recommended that the patient should undergo a colonoscopy in the future.

## 2017-12-04 ENCOUNTER — TRANSITIONAL CARE MANAGEMENT TELEPHONE ENCOUNTER (OUTPATIENT)
Dept: FAMILY MEDICINE CLINIC | Facility: CLINIC | Age: 52
End: 2017-12-04

## 2017-12-05 NOTE — OUTREACH NOTE
EDWINA call completed.  Please refer to TCM call flowsheet for call documentation.  Patient declined to be seen at this time.  She is doing very well.

## 2018-04-04 ENCOUNTER — OFFICE VISIT (OUTPATIENT)
Dept: INTERNAL MEDICINE | Facility: CLINIC | Age: 53
End: 2018-04-04

## 2018-04-04 VITALS
TEMPERATURE: 99.1 F | OXYGEN SATURATION: 97 % | DIASTOLIC BLOOD PRESSURE: 80 MMHG | WEIGHT: 293 LBS | HEART RATE: 87 BPM | BODY MASS INDEX: 50.02 KG/M2 | RESPIRATION RATE: 14 BRPM | SYSTOLIC BLOOD PRESSURE: 122 MMHG | HEIGHT: 64 IN

## 2018-04-04 DIAGNOSIS — Z12.39 BREAST CANCER SCREENING: ICD-10-CM

## 2018-04-04 DIAGNOSIS — E66.01 OBESITY, MORBID (HCC): Primary | ICD-10-CM

## 2018-04-04 PROCEDURE — 99203 OFFICE O/P NEW LOW 30 MIN: CPT | Performed by: FAMILY MEDICINE

## 2018-04-04 NOTE — PATIENT INSTRUCTIONS
Obesity, Adult  Obesity is the condition of having too much total body fat. Being overweight or obese means that your weight is greater than what is considered healthy for your body size. Obesity is determined by a measurement called BMI. BMI is an estimate of body fat and is calculated from height and weight. For adults, a BMI of 30 or higher is considered obese.  Obesity can eventually lead to other health concerns and major illnesses, including:  · Stroke.  · Coronary artery disease (CAD).  · Type 2 diabetes.  · Some types of cancer, including cancers of the colon, breast, uterus, and gallbladder.  · Osteoarthritis.  · High blood pressure (hypertension).  · High cholesterol.  · Sleep apnea.  · Gallbladder stones.  · Infertility problems.  What are the causes?  The main cause of obesity is taking in (consuming) more calories than your body uses for energy. Other factors that contribute to this condition may include:  · Being born with genes that make you more likely to become obese.  · Having a medical condition that causes obesity. These conditions include:  ¨ Hypothyroidism.  ¨ Polycystic ovarian syndrome (PCOS).  ¨ Binge-eating disorder.  ¨ Cushing syndrome.  · Taking certain medicines, such as steroids, antidepressants, and seizure medicines.  · Not being physically active (sedentary lifestyle).  · Living where there are limited places to exercise safely or buy healthy foods.  · Not getting enough sleep.  What increases the risk?  The following factors may increase your risk of this condition:  · Having a family history of obesity.  · Being a woman of -American descent.  · Being a man of  descent.  What are the signs or symptoms?  Having excessive body fat is the main symptom of this condition.  How is this diagnosed?  This condition may be diagnosed based on:  · Your symptoms.  · Your medical history.  · A physical exam. Your health care provider may measure:  ¨ Your BMI. If you are an  adult with a BMI between 25 and less than 30, you are considered overweight. If you are an adult with a BMI of 30 or higher, you are considered obese.  ¨ The distances around your hips and your waist (circumferences). These may be compared to each other to help diagnose your condition.  ¨ Your skinfold thickness. Your health care provider may gently pinch a fold of your skin and measure it.  How is this treated?  Treatment for this condition often includes changing your lifestyle. Treatment may include some or all of the following:  · Dietary changes. Work with your health care provider and a dietitian to set a weight-loss goal that is healthy and reasonable for you. Dietary changes may include eating:  ¨ Smaller portions. A portion size is the amount of a particular food that is healthy for you to eat at one time. This varies from person to person.  ¨ Low-calorie or low-fat options.  ¨ More whole grains, fruits, and vegetables.  · Regular physical activity. This may include aerobic activity (cardio) and strength training.  · Medicine to help you lose weight. Your health care provider may prescribe medicine if you are unable to lose 1 pound a week after 6 weeks of eating more healthily and doing more physical activity.  · Surgery. Surgical options may include gastric banding and gastric bypass. Surgery may be done if:  ¨ Other treatments have not helped to improve your condition.  ¨ You have a BMI of 40 or higher.  ¨ You have life-threatening health problems related to obesity.  Follow these instructions at home:     Eating and drinking     · Follow recommendations from your health care provider about what you eat and drink. Your health care provider may advise you to:  ¨ Limit fast foods, sweets, and processed snack foods.  ¨ Choose low-fat options, such as low-fat milk instead of whole milk.  ¨ Eat 5 or more servings of fruits or vegetables every day.  ¨ Eat at home more often. This gives you more control over  what you eat.  ¨ Choose healthy foods when you eat out.  ¨ Learn what a healthy portion size is.  ¨ Keep low-fat snacks on hand.  ¨ Avoid sugary drinks, such as soda, fruit juice, iced tea sweetened with sugar, and flavored milk.  ¨ Eat a healthy breakfast.  · Drink enough water to keep your urine clear or pale yellow.  · Do not go without eating for long periods of time (do not fast) or follow a fad diet. Fasting and fad diets can be unhealthy and even dangerous.  Physical Activity   · Exercise regularly, as told by your health care provider. Ask your health care provider what types of exercise are safe for you and how often you should exercise.  · Warm up and stretch before being active.  · Cool down and stretch after being active.  · Rest between periods of activity.  Lifestyle   · Limit the time that you spend in front of your TV, computer, or video game system.  · Find ways to reward yourself that do not involve food.  · Limit alcohol intake to no more than 1 drink a day for nonpregnant women and 2 drinks a day for men. One drink equals 12 oz of beer, 5 oz of wine, or 1½ oz of hard liquor.  General instructions   · Keep a weight loss journal to keep track of the food you eat and how much you exercise you get.  · Take over-the-counter and prescription medicines only as told by your health care provider.  · Take vitamins and supplements only as told by your health care provider.  · Consider joining a support group. Your health care provider may be able to recommend a support group.  · Keep all follow-up visits as told by your health care provider. This is important.  Contact a health care provider if:  · You are unable to meet your weight loss goal after 6 weeks of dietary and lifestyle changes.  This information is not intended to replace advice given to you by your health care provider. Make sure you discuss any questions you have with your health care provider.  Document Released: 01/25/2006 Document Revised:  05/22/2017 Document Reviewed: 10/05/2016  [employer] Interactive Patient Education © 2017 [employer]      Exercising to Lose Weight  Exercising can help you to lose weight. In order to lose weight through exercise, you need to do vigorous-intensity exercise. You can tell that you are exercising with vigorous intensity if you are breathing very hard and fast and cannot hold a conversation while exercising.  Moderate-intensity exercise helps to maintain your current weight. You can tell that you are exercising at a moderate level if you have a higher heart rate and faster breathing, but you are still able to hold a conversation.  How often should I exercise?  Choose an activity that you enjoy and set realistic goals. Your health care provider can help you to make an activity plan that works for you. Exercise regularly as directed by your health care provider. This may include:  · Doing resistance training twice each week, such as:  ¨ Push-ups.  ¨ Sit-ups.  ¨ Lifting weights.  ¨ Using resistance bands.  · Doing a given intensity of exercise for a given amount of time. Choose from these options:  ¨ 150 minutes of moderate-intensity exercise every week.  ¨ 75 minutes of vigorous-intensity exercise every week.  ¨ A mix of moderate-intensity and vigorous-intensity exercise every week.  Children, pregnant women, people who are out of shape, people who are overweight, and older adults may need to consult a health care provider for individual recommendations. If you have any sort of medical condition, be sure to consult your health care provider before starting a new exercise program.  What are some activities that can help me to lose weight?  · Walking at a rate of at least 4.5 miles an hour.  · Jogging or running at a rate of 5 miles per hour.  · Biking at a rate of at least 10 miles per hour.  · Lap swimming.  · Roller-skating or in-line skating.  · Cross-country skiing.  · Vigorous competitive sports, such as football,  basketball, and soccer.  · Jumping rope.  · Aerobic dancing.  How can I be more active in my day-to-day activities?  · Use the stairs instead of the elevator.  · Take a walk during your lunch break.  · If you drive, park your car farther away from work or school.  · If you take public transportation, get off one stop early and walk the rest of the way.  · Make all of your phone calls while standing up and walking around.  · Get up, stretch, and walk around every 30 minutes throughout the day.  What guidelines should I follow while exercising?  · Do not exercise so much that you hurt yourself, feel dizzy, or get very short of breath.  · Consult your health care provider prior to starting a new exercise program.  · Wear comfortable clothes and shoes with good support.  · Drink plenty of water while you exercise to prevent dehydration or heat stroke. Body water is lost during exercise and must be replaced.  · Work out until you breathe faster and your heart beats faster.  This information is not intended to replace advice given to you by your health care provider. Make sure you discuss any questions you have with your health care provider.  Document Released: 01/20/2012 Document Revised: 05/25/2017 Document Reviewed: 05/21/2015  [employer] Interactive Patient Education © 2017 [employer]      MyPlate from [employer]  The general, healthful diet is based on the 2010 Dietary Guidelines for Americans. The amount of food you need to eat from each food group depends on your age, sex, and level of physical activity and can be individualized by a dietitian. Go to ChooseMyPlate.gov for more information.  What do I need to know about the MyPlate plan?  · Enjoy your food, but eat less.  · Avoid oversized portions.  ¨ ½ of your plate should include fruits and vegetables.  ¨ ¼ of your plate should be grains.  ¨ ¼ of your plate should be protein.  Grains   · Make at least half of your grains whole grains.  · For a 2,000 calorie daily  food plan, eat 6 oz every day.  · 1 oz is about 1 slice bread, 1 cup cereal, or ½ cup cooked rice, cereal, or pasta.  Vegetables   · Make half your plate fruits and vegetables.  · For a 2,000 calorie daily food plan, eat 2½ cups every day.  · 1 cup is about 1 cup raw or cooked vegetables or vegetable juice or 2 cups raw leafy greens.  Fruits   · Make half your plate fruits and vegetables.  · For a 2,000 calorie daily food plan, eat 2 cups every day.  · 1 cup is about 1 cup fruit or 100% fruit juice or ½ cup dried fruit.  Protein   · For a 2,000 calorie daily food plan, eat 5½ oz every day.  · 1 oz is about 1 oz meat, poultry, or fish, ¼ cup cooked beans, 1 egg, 1 Tbsp peanut butter, or ½ oz nuts or seeds.  Dairy   · Switch to fat-free or low-fat (1%) milk.  · For a 2,000 calorie daily food plan, eat 3 cups every day.  · 1 cup is about 1 cup milk or yogurt or soy milk (soy beverage), 1½ oz natural cheese, or 2 oz processed cheese.  Fats, Oils, and Empty Calories   · Only small amounts of oils are recommended.  · Empty calories are calories from solid fats or added sugars.  · Compare sodium in foods like soup, bread, and frozen meals. Choose the foods with lower numbers.  · Drink water instead of sugary drinks.  What foods can I eat?  Grains   Whole grains such as whole wheat, quinoa, millet, and bulgur. Bread, rolls, and pasta made from whole grains. Brown or wild rice. Hot or cold cereals made from whole grains and without added sugar.  Vegetables   All fresh vegetables, especially fresh red, dark green, or orange vegetables. Peas and beans. Low-sodium frozen or canned vegetables prepared without added salt. Low-sodium vegetable juices.  Fruits   All fresh, frozen, and dried fruits. Canned fruit packed in water or fruit juice without added sugar. Fruit juices without added sugar.  Meats and Other Protein Sources   Boiled, baked, or grilled lean meat trimmed of fat. Skinless poultry. Fresh seafood and shellfish.  Canned seafood packed in water. Unsalted nuts and unsalted nut butters. Tofu. Dried beans and pea. Eggs.  Dairy   Low-fat or fat-free milk, yogurt, and cheeses.  Sweets and Desserts   Frozen desserts made from low-fat milk.  Fats and Oils   Olive, peanut, and canola oils and margarine. Salad dressing and mayonnaise made from these oils.  Other   Soups and casseroles made from allowed ingredients and without added fat or salt.  The items listed above may not be a complete list of recommended foods or beverages. Contact your dietitian for more options.   What foods are not recommended?  Grains   Sweetened, low-fiber cereals. Packaged baked goods. Snack crackers and chips. Cheese crackers, butter crackers, and biscuits. Frozen waffles, sweet breads, doughnuts, pastries, packaged baking mixes, pancakes, cakes, and cookies.  Vegetables   Regular canned or frozen vegetables or vegetables prepared with salt. Canned tomatoes. Canned tomato sauce. Fried vegetables. Vegetables in cream sauce or cheese sauce.  Fruits   Fruits packed in syrup or made with added sugar.  Meats and Other Protein Sources   Marbled or fatty meats such as ribs. Poultry with skin. Fried meats, poultry, eggs, or fish. Sausages, hot dogs, and deli meats such as pastrami, bologna, or salami.  Dairy   Whole milk, cream, cheeses made from whole milk, sour cream. Ice cream or yogurt made from whole milk or with added sugar.  Beverages   For adults, no more than one alcoholic drink per day. Regular soft drinks or other sugary beverages. Juice drinks.  Sweets and Desserts   Sugary or fatty desserts, candy, and other sweets.  Fats and Oils   Solid shortening or partially hydrogenated oils. Solid margarine. Margarine that contains trans fats. Butter.  The items listed above may not be a complete list of foods and beverages to avoid. Contact your dietitian for more information.   This information is not intended to replace advice given to you by your health care  provider. Make sure you discuss any questions you have with your health care provider.  Document Released: 01/06/2009 Document Revised: 05/25/2017 Document Reviewed: 11/26/2014  [employer] Interactive Patient Education © 2017 [employer]    Serving Sizes  A serving size is a measured amount of food or drink, such as one slice of bread, that has an associated nutrient content. Knowing the serving size of a food or drink can help you determine how much of that food you should consume.  What is the size of one serving?  The size of one healthy serving depends on the food or drink. To determine a serving size, read the food label. If the food or drink does not have a food label, try to find serving size information online. Or, use the following to estimate the size of one adult serving:  Grain   1 slice bread. ½ bagel. ½ cup pasta.  Vegetable   ½ cup cooked or canned vegetables. 1 cup raw, leafy greens.  Fruit   ½ cup canned fruit. 1 medium fruit. ¼ cup dried fruit.  Meat and Other Protein Sources   1 oz meat, poultry, or fish. ¼ cup cooked beans. 1 egg. ¼ cup nuts or seeds. 1 Tbsp nut butter. ¼ cup tofu or tempeh. 2 Tbsp hummus.  Dairy   An individual container of yogurt (6-8 oz). 1 piece of cheese the size of your thumb (1 oz). 1 cup (8 oz) milk or milk alternative.  Fat   A piece the size of one dice. 1 tsp soft margarine. 1 Tbsp mayonnaise. 1 tsp vegetable oil. 1 Tbsp regular salad dressing. 2 Tbsp low-fat salad dressing.  How many servings should I eat from each food group each day?  The following are the suggested number of servings to try and have every day from each food group. You can also look at your eating throughout the week and aim for meeting these requirements on most days for overall healthy eating.  Grain   6-8 servings. Try to have half of your grains from whole grains, such as whole wheat bread, corn tortillas, oatmeal, brown rice, whole wheat pasta, and bulgur.  Vegetable   At least 2½-3  servings.  Fruit   2 servings.  Meat and Other Protein Foods   5-6 servings. Aim to have lean proteins, such as chicken, turkey, fish, beans, or tofu.  Dairy   3 servings. Choose low-fat or nonfat if you are trying to control your weight.  Fat   2-3 servings.  Is a serving the same thing as a portion?  No. A portion is the actual amount you eat, which may be more than one serving. Knowing the specific serving size of a food and the nutritional information that goes with it can help you make a healthy decision on what size portion to eat.  What are some tips to help me learn healthy serving sizes?  · Check food labels for serving sizes. Many foods that come as a single portion actually contain multiple servings.  · Determine the serving size of foods you commonly eat and figure out how large a portion you usually eat.  · Measure the number of servings that can be held by the bowls, glasses, cups, and plates you typically use. For example, pour your breakfast cereal into your regular bowl and then pour it into a measuring cup.  · For 1-2 days, measure the serving sizes of all the foods you eat.  · Practice estimating serving sizes and determining how big your portions should be.  This information is not intended to replace advice given to you by your health care provider. Make sure you discuss any questions you have with your health care provider.  Document Released: 09/15/2004 Document Revised: 08/12/2017 Document Reviewed: 03/17/2015  [employer] Interactive Patient Education © 2017 [employer]

## 2018-04-04 NOTE — PROGRESS NOTES
"Subjective    Lea Thompson is a 52 y.o. female here for:  Chief Complaint   Patient presents with   • Establish Care   • Back Pain     History of Present Illness     Patient was having back pain with radiation down leg but she did exercises prior to visit and that has improved. Overall is feeling okay at this time. Not on any medicines. She is not exercising for her morbid obesity. Has not been eating best diet.     The following portions of the patient's history were reviewed and updated as appropriate: allergies, current medications, past family history, past medical history, past social history, past surgical history and problem list.    Review of Systems   Constitutional: Positive for unexpected weight gain.   Respiratory: Positive for shortness of breath (with exertion).    Musculoskeletal: Positive for arthralgias.   Neurological: Positive for headaches.   Psychiatric/Behavioral: Positive for depressed mood. The patient is nervous/anxious.    All other systems reviewed and are negative.      Vitals:    04/04/18 0926   BP: 122/80   Pulse: 87   Resp: 14   Temp: 99.1 °F (37.3 °C)   SpO2: 97%   Weight: (!) 149 kg (329 lb)   Height: 163.8 cm (64.49\")         Objective   Physical Exam   Constitutional: She is oriented to person, place, and time. Vital signs are normal. She appears well-developed and well-nourished. She is active.  Non-toxic appearance. She does not appear ill. No distress. She is morbidly obese.  HENT:   Head: Normocephalic and atraumatic. Hair is normal.   Right Ear: Hearing and external ear normal.   Left Ear: Hearing and external ear normal.   Nose: Nose normal.   Mouth/Throat: Mucous membranes are not dry.   Eyes: EOM and lids are normal. Pupils are equal, round, and reactive to light. No scleral icterus.   Neck: Neck supple.   Cardiovascular: Normal rate, regular rhythm and normal heart sounds.    No murmur heard.  Pulmonary/Chest: Effort normal and breath sounds normal.   Neurological: She " is alert and oriented to person, place, and time. She displays no tremor. No cranial nerve deficit. Gait normal.   Skin: Skin is warm and dry. No rash noted. She is not diaphoretic. No cyanosis. Nails show no clubbing.   Psychiatric: She has a normal mood and affect. Her speech is normal and behavior is normal. Judgment and thought content normal. Cognition and memory are normal.   Nursing note and vitals reviewed.        Assessment/Plan     Problem List Items Addressed This Visit        Digestive    Obesity, morbid - Primary    Current Assessment & Plan     Discussed the patient's BMI with her. BMI is above normal parameters. Follow-up plan includes:  educational material, exercise counseling and nutrition counseling.             Other Visit Diagnoses     Breast cancer screening        Relevant Orders    Mammo Screening Digital Tomosynthesis Bilateral With CAD        ·     Return in about 6 months (around 10/4/2018) for Pap Smear, Annual physical, Fasting.    Marianela Espinal MD    Please note that portions of this note may have been completed with a voice recognition program. Efforts were made to edit dictation, but occasionally words are mistranscribed.

## 2018-04-07 PROBLEM — K83.1 OBSTRUCTIVE JAUNDICE: Status: RESOLVED | Noted: 2017-02-15 | Resolved: 2018-04-07

## 2018-04-07 PROBLEM — E66.01 OBESITY, MORBID (HCC): Status: ACTIVE | Noted: 2018-04-07

## 2018-04-07 PROBLEM — K83.8 DILATED CBD, ACQUIRED: Status: RESOLVED | Noted: 2017-11-22 | Resolved: 2018-04-07

## 2018-04-07 PROBLEM — K80.50 CHOLEDOCHOLITHIASIS: Status: RESOLVED | Noted: 2017-02-15 | Resolved: 2018-04-07

## 2018-04-07 NOTE — ASSESSMENT & PLAN NOTE
Discussed the patient's BMI with her. BMI is above normal parameters. Follow-up plan includes:  educational material, exercise counseling and nutrition counseling.

## 2018-05-04 ENCOUNTER — HOSPITAL ENCOUNTER (OUTPATIENT)
Dept: MAMMOGRAPHY | Facility: HOSPITAL | Age: 53
Discharge: HOME | End: 2018-05-04
Admitting: FAMILY MEDICINE

## 2018-05-04 PROCEDURE — 77063 BREAST TOMOSYNTHESIS BI: CPT

## 2018-05-04 PROCEDURE — 77067 SCR MAMMO BI INCL CAD: CPT

## 2018-11-06 ENCOUNTER — OFFICE VISIT (OUTPATIENT)
Dept: INTERNAL MEDICINE | Facility: CLINIC | Age: 53
End: 2018-11-06

## 2018-11-06 ENCOUNTER — TELEPHONE (OUTPATIENT)
Dept: SURGERY | Facility: CLINIC | Age: 53
End: 2018-11-06

## 2018-11-06 VITALS
HEART RATE: 92 BPM | BODY MASS INDEX: 50.02 KG/M2 | TEMPERATURE: 98.4 F | WEIGHT: 293 LBS | SYSTOLIC BLOOD PRESSURE: 120 MMHG | HEIGHT: 64 IN | OXYGEN SATURATION: 98 % | DIASTOLIC BLOOD PRESSURE: 70 MMHG

## 2018-11-06 DIAGNOSIS — Z13.220 SCREENING, LIPID: ICD-10-CM

## 2018-11-06 DIAGNOSIS — Z00.00 ANNUAL PHYSICAL EXAM: Primary | ICD-10-CM

## 2018-11-06 DIAGNOSIS — R73.9 HYPERGLYCEMIA: ICD-10-CM

## 2018-11-06 DIAGNOSIS — E66.01 CLASS 3 SEVERE OBESITY WITHOUT SERIOUS COMORBIDITY WITH BODY MASS INDEX (BMI) OF 50.0 TO 59.9 IN ADULT, UNSPECIFIED OBESITY TYPE (HCC): ICD-10-CM

## 2018-11-06 DIAGNOSIS — Z72.0 TOBACCO ABUSE: ICD-10-CM

## 2018-11-06 DIAGNOSIS — Z12.11 COLON CANCER SCREENING: ICD-10-CM

## 2018-11-06 DIAGNOSIS — Z13.29 THYROID DISORDER SCREENING: ICD-10-CM

## 2018-11-06 DIAGNOSIS — Z12.4 PAP SMEAR FOR CERVICAL CANCER SCREENING: ICD-10-CM

## 2018-11-06 PROBLEM — G47.33 OSA (OBSTRUCTIVE SLEEP APNEA): Status: ACTIVE | Noted: 2018-11-06

## 2018-11-06 PROBLEM — Z86.0100 HISTORY OF COLON POLYPS: Status: ACTIVE | Noted: 2018-11-06

## 2018-11-06 PROBLEM — E66.813 CLASS 3 SEVERE OBESITY WITHOUT SERIOUS COMORBIDITY WITH BODY MASS INDEX (BMI) OF 50.0 TO 59.9 IN ADULT: Status: ACTIVE | Noted: 2018-04-07

## 2018-11-06 PROBLEM — Z87.19 HISTORY OF PANCREATITIS: Status: ACTIVE | Noted: 2018-11-06

## 2018-11-06 PROBLEM — Z86.010 HISTORY OF COLON POLYPS: Status: ACTIVE | Noted: 2018-11-06

## 2018-11-06 LAB
ALBUMIN SERPL-MCNC: 4 G/DL (ref 3.5–5)
ALBUMIN/GLOB SERPL: 1.3 G/DL (ref 1–2)
ALP SERPL-CCNC: 103 U/L (ref 38–126)
ALT SERPL-CCNC: 48 U/L (ref 13–69)
AST SERPL-CCNC: 30 U/L (ref 15–46)
BASOPHILS # BLD AUTO: 0.04 10*3/MM3 (ref 0–0.2)
BASOPHILS NFR BLD AUTO: 0.5 % (ref 0–2.5)
BILIRUB SERPL-MCNC: 0.6 MG/DL (ref 0.2–1.3)
BUN SERPL-MCNC: 14 MG/DL (ref 7–20)
BUN/CREAT SERPL: 17.5 (ref 7.1–23.5)
CALCIUM SERPL-MCNC: 9.6 MG/DL (ref 8.4–10.2)
CHLORIDE SERPL-SCNC: 106 MMOL/L (ref 98–107)
CHOLEST SERPL-MCNC: 215 MG/DL (ref 0–199)
CO2 SERPL-SCNC: 25 MMOL/L (ref 26–30)
CREAT SERPL-MCNC: 0.8 MG/DL (ref 0.6–1.3)
EOSINOPHIL # BLD AUTO: 0.23 10*3/MM3 (ref 0–0.7)
EOSINOPHIL NFR BLD AUTO: 3.1 % (ref 0–7)
ERYTHROCYTE [DISTWIDTH] IN BLOOD BY AUTOMATED COUNT: 12.5 % (ref 11.5–14.5)
GLOBULIN SER CALC-MCNC: 3 GM/DL
GLUCOSE SERPL-MCNC: 113 MG/DL (ref 74–98)
HBA1C MFR BLD: 5.4 %
HCT VFR BLD AUTO: 45.9 % (ref 37–47)
HDLC SERPL-MCNC: 55 MG/DL (ref 40–60)
HGB BLD-MCNC: 15.5 G/DL (ref 12–16)
IMM GRANULOCYTES # BLD: 0.04 10*3/MM3 (ref 0–0.06)
IMM GRANULOCYTES NFR BLD: 0.5 % (ref 0–0.6)
LDLC SERPL CALC-MCNC: 131 MG/DL (ref 0–99)
LYMPHOCYTES # BLD AUTO: 2.3 10*3/MM3 (ref 0.6–3.4)
LYMPHOCYTES NFR BLD AUTO: 31.2 % (ref 10–50)
MCH RBC QN AUTO: 33.3 PG (ref 27–31)
MCHC RBC AUTO-ENTMCNC: 33.8 G/DL (ref 30–37)
MCV RBC AUTO: 98.5 FL (ref 81–99)
MONOCYTES # BLD AUTO: 0.46 10*3/MM3 (ref 0–0.9)
MONOCYTES NFR BLD AUTO: 6.2 % (ref 0–12)
NEUTROPHILS # BLD AUTO: 4.3 10*3/MM3 (ref 2–6.9)
NEUTROPHILS NFR BLD AUTO: 58.5 % (ref 37–80)
NRBC BLD AUTO-RTO: 0 /100 WBC (ref 0–0)
PLATELET # BLD AUTO: 177 10*3/MM3 (ref 130–400)
POTASSIUM SERPL-SCNC: 4.5 MMOL/L (ref 3.5–5.1)
PROT SERPL-MCNC: 7 G/DL (ref 6.3–8.2)
RBC # BLD AUTO: 4.66 10*6/MM3 (ref 4.2–5.4)
SODIUM SERPL-SCNC: 137 MMOL/L (ref 137–145)
T4 FREE SERPL-MCNC: 1.2 NG/DL (ref 0.78–2.19)
TRIGL SERPL-MCNC: 147 MG/DL
TSH SERPL DL<=0.005 MIU/L-ACNC: 1.25 MIU/ML (ref 0.47–4.68)
VLDLC SERPL CALC-MCNC: 29.4 MG/DL
WBC # BLD AUTO: 7.37 10*3/MM3 (ref 4.8–10.8)

## 2018-11-06 PROCEDURE — 99396 PREV VISIT EST AGE 40-64: CPT | Performed by: FAMILY MEDICINE

## 2018-11-06 NOTE — PATIENT INSTRUCTIONS
Health Maintenance, Female  Adopting a healthy lifestyle and getting preventive care can go a long way to promote health and wellness. Talk with your health care provider about what schedule of regular examinations is right for you. This is a good chance for you to check in with your provider about disease prevention and staying healthy.  In between checkups, there are plenty of things you can do on your own. Experts have done a lot of research about which lifestyle changes and preventive measures are most likely to keep you healthy. Ask your health care provider for more information.  Weight and diet  Eat a healthy diet  · Be sure to include plenty of vegetables, fruits, low-fat dairy products, and lean protein.  · Do not eat a lot of foods high in solid fats, added sugars, or salt.  · Get regular exercise. This is one of the most important things you can do for your health.  ? Most adults should exercise for at least 150 minutes each week. The exercise should increase your heart rate and make you sweat (moderate-intensity exercise).  ? Most adults should also do strengthening exercises at least twice a week. This is in addition to the moderate-intensity exercise.    Maintain a healthy weight  · Body mass index (BMI) is a measurement that can be used to identify possible weight problems. It estimates body fat based on height and weight. Your health care provider can help determine your BMI and help you achieve or maintain a healthy weight.  · For females 20 years of age and older:  ? A BMI below 18.5 is considered underweight.  ? A BMI of 18.5 to 24.9 is normal.  ? A BMI of 25 to 29.9 is considered overweight.  ? A BMI of 30 and above is considered obese.    Watch levels of cholesterol and blood lipids  · You should start having your blood tested for lipids and cholesterol at 20 years of age, then have this test every 5 years.  · You may need to have your cholesterol levels checked more often if:  ? Your lipid or  cholesterol levels are high.  ? You are older than 50 years of age.  ? You are at high risk for heart disease.    Cancer screening  Lung Cancer  · Lung cancer screening is recommended for adults 55-80 years old who are at high risk for lung cancer because of a history of smoking.  · A yearly low-dose CT scan of the lungs is recommended for people who:  ? Currently smoke.  ? Have quit within the past 15 years.  ? Have at least a 30-pack-year history of smoking. A pack year is smoking an average of one pack of cigarettes a day for 1 year.  · Yearly screening should continue until it has been 15 years since you quit.  · Yearly screening should stop if you develop a health problem that would prevent you from having lung cancer treatment.    Breast Cancer  · Practice breast self-awareness. This means understanding how your breasts normally appear and feel.  · It also means doing regular breast self-exams. Let your health care provider know about any changes, no matter how small.  · If you are in your 20s or 30s, you should have a clinical breast exam (CBE) by a health care provider every 1-3 years as part of a regular health exam.  · If you are 40 or older, have a CBE every year. Also consider having a breast X-ray (mammogram) every year.  · If you have a family history of breast cancer, talk to your health care provider about genetic screening.  · If you are at high risk for breast cancer, talk to your health care provider about having an MRI and a mammogram every year.  · Breast cancer gene (BRCA) assessment is recommended for women who have family members with BRCA-related cancers. BRCA-related cancers include:  ? Breast.  ? Ovarian.  ? Tubal.  ? Peritoneal cancers.  · Results of the assessment will determine the need for genetic counseling and BRCA1 and BRCA2 testing.    Cervical Cancer  Your health care provider may recommend that you be screened regularly for cancer of the pelvic organs (ovaries, uterus, and  vagina). This screening involves a pelvic examination, including checking for microscopic changes to the surface of your cervix (Pap test). You may be encouraged to have this screening done every 3 years, beginning at age 21.  · For women ages 30-65, health care providers may recommend pelvic exams and Pap testing every 3 years, or they may recommend the Pap and pelvic exam, combined with testing for human papilloma virus (HPV), every 5 years. Some types of HPV increase your risk of cervical cancer. Testing for HPV may also be done on women of any age with unclear Pap test results.  · Other health care providers may not recommend any screening for nonpregnant women who are considered low risk for pelvic cancer and who do not have symptoms. Ask your health care provider if a screening pelvic exam is right for you.  · If you have had past treatment for cervical cancer or a condition that could lead to cancer, you need Pap tests and screening for cancer for at least 20 years after your treatment. If Pap tests have been discontinued, your risk factors (such as having a new sexual partner) need to be reassessed to determine if screening should resume. Some women have medical problems that increase the chance of getting cervical cancer. In these cases, your health care provider may recommend more frequent screening and Pap tests.    Colorectal Cancer  · This type of cancer can be detected and often prevented.  · Routine colorectal cancer screening usually begins at 50 years of age and continues through 75 years of age.  · Your health care provider may recommend screening at an earlier age if you have risk factors for colon cancer.  · Your health care provider may also recommend using home test kits to check for hidden blood in the stool.  · A small camera at the end of a tube can be used to examine your colon directly (sigmoidoscopy or colonoscopy). This is done to check for the earliest forms of colorectal  cancer.  · Routine screening usually begins at age 50.  · Direct examination of the colon should be repeated every 5-10 years through 75 years of age. However, you may need to be screened more often if early forms of precancerous polyps or small growths are found.    Skin Cancer  · Check your skin from head to toe regularly.  · Tell your health care provider about any new moles or changes in moles, especially if there is a change in a mole's shape or color.  · Also tell your health care provider if you have a mole that is larger than the size of a pencil eraser.  · Always use sunscreen. Apply sunscreen liberally and repeatedly throughout the day.  · Protect yourself by wearing long sleeves, pants, a wide-brimmed hat, and sunglasses whenever you are outside.    Heart disease, diabetes, and high blood pressure  · High blood pressure causes heart disease and increases the risk of stroke. High blood pressure is more likely to develop in:  ? People who have blood pressure in the high end of the normal range (130-139/85-89 mm Hg).  ? People who are overweight or obese.  ? People who are .  · If you are 18-39 years of age, have your blood pressure checked every 3-5 years. If you are 40 years of age or older, have your blood pressure checked every year. You should have your blood pressure measured twice--once when you are at a hospital or clinic, and once when you are not at a hospital or clinic. Record the average of the two measurements. To check your blood pressure when you are not at a hospital or clinic, you can use:  ? An automated blood pressure machine at a pharmacy.  ? A home blood pressure monitor.  · If you are between 55 years and 79 years old, ask your health care provider if you should take aspirin to prevent strokes.  · Have regular diabetes screenings. This involves taking a blood sample to check your fasting blood sugar level.  ? If you are at a normal weight and have a low risk for  diabetes, have this test once every three years after 45 years of age.  ? If you are overweight and have a high risk for diabetes, consider being tested at a younger age or more often.  Preventing infection  Hepatitis B  · If you have a higher risk for hepatitis B, you should be screened for this virus. You are considered at high risk for hepatitis B if:  ? You were born in a country where hepatitis B is common. Ask your health care provider which countries are considered high risk.  ? Your parents were born in a high-risk country, and you have not been immunized against hepatitis B (hepatitis B vaccine).  ? You have HIV or AIDS.  ? You use needles to inject street drugs.  ? You live with someone who has hepatitis B.  ? You have had sex with someone who has hepatitis B.  ? You get hemodialysis treatment.  ? You take certain medicines for conditions, including cancer, organ transplantation, and autoimmune conditions.    Hepatitis C  · Blood testing is recommended for:  ? Everyone born from 1945 through 1965.  ? Anyone with known risk factors for hepatitis C.    Sexually transmitted infections (STIs)  · You should be screened for sexually transmitted infections (STIs) including gonorrhea and chlamydia if:  ? You are sexually active and are younger than 24 years of age.  ? You are older than 24 years of age and your health care provider tells you that you are at risk for this type of infection.  ? Your sexual activity has changed since you were last screened and you are at an increased risk for chlamydia or gonorrhea. Ask your health care provider if you are at risk.  · If you do not have HIV, but are at risk, it may be recommended that you take a prescription medicine daily to prevent HIV infection. This is called pre-exposure prophylaxis (PrEP). You are considered at risk if:  ? You are sexually active and do not regularly use condoms or know the HIV status of your partner(s).  ? You take drugs by injection.  ? You  are sexually active with a partner who has HIV.    Talk with your health care provider about whether you are at high risk of being infected with HIV. If you choose to begin PrEP, you should first be tested for HIV. You should then be tested every 3 months for as long as you are taking PrEP.  Pregnancy  · If you are premenopausal and you may become pregnant, ask your health care provider about preconception counseling.  · If you may become pregnant, take 400 to 800 micrograms (mcg) of folic acid every day.  · If you want to prevent pregnancy, talk to your health care provider about birth control (contraception).  Osteoporosis and menopause  · Osteoporosis is a disease in which the bones lose minerals and strength with aging. This can result in serious bone fractures. Your risk for osteoporosis can be identified using a bone density scan.  · If you are 65 years of age or older, or if you are at risk for osteoporosis and fractures, ask your health care provider if you should be screened.  · Ask your health care provider whether you should take a calcium or vitamin D supplement to lower your risk for osteoporosis.  · Menopause may have certain physical symptoms and risks.  · Hormone replacement therapy may reduce some of these symptoms and risks.  Talk to your health care provider about whether hormone replacement therapy is right for you.  Follow these instructions at home:  · Schedule regular health, dental, and eye exams.  · Stay current with your immunizations.  · Do not use any tobacco products including cigarettes, chewing tobacco, or electronic cigarettes.  · If you are pregnant, do not drink alcohol.  · If you are breastfeeding, limit how much and how often you drink alcohol.  · Limit alcohol intake to no more than 1 drink per day for nonpregnant women. One drink equals 12 ounces of beer, 5 ounces of wine, or 1½ ounces of hard liquor.  · Do not use street drugs.  · Do not share needles.  · Ask your health care  provider for help if you need support or information about quitting drugs.  · Tell your health care provider if you often feel depressed.  · Tell your health care provider if you have ever been abused or do not feel safe at home.  This information is not intended to replace advice given to you by your health care provider. Make sure you discuss any questions you have with your health care provider.  Document Released: 07/02/2012 Document Revised: 05/25/2017 Document Reviewed: 09/20/2016  [employer] Interactive Patient Education © 2018 [employer]  Serving Sizes  A serving size is a measured amount of food or drink, such as one slice of bread, that has an associated nutrient content. Knowing the serving size of a food or drink can help you determine how much of that food you should consume.  What is the size of one serving?  The size of one healthy serving depends on the food or drink. To determine a serving size, read the food label. If the food or drink does not have a food label, try to find serving size information online. Or, use the following to estimate the size of one adult serving:  Grain  1 slice bread. ½ bagel. ½ cup pasta.  Vegetable  ½ cup cooked or canned vegetables. 1 cup raw, leafy greens.  Fruit  ½ cup canned fruit. 1 medium fruit. ¼ cup dried fruit.  Meat and Other Protein Sources  1 oz meat, poultry, or fish. ¼ cup cooked beans. 1 egg. ¼ cup nuts or seeds. 1 Tbsp nut butter. ¼ cup tofu or tempeh. 2 Tbsp hummus.  Dairy  An individual container of yogurt (6-8 oz). 1 piece of cheese the size of your thumb (1 oz). 1 cup (8 oz) milk or milk alternative.  Fat  A piece the size of one dice. 1 tsp soft margarine. 1 Tbsp mayonnaise. 1 tsp vegetable oil. 1 Tbsp regular salad dressing. 2 Tbsp low-fat salad dressing.  How many servings should I eat from each food group each day?  The following are the suggested number of servings to try and have every day from each food group. You can also look at your eating  throughout the week and aim for meeting these requirements on most days for overall healthy eating.  Grain  6-8 servings. Try to have half of your grains from whole grains, such as whole wheat bread, corn tortillas, oatmeal, brown rice, whole wheat pasta, and bulgur.  Vegetable  At least 2½-3 servings.  Fruit  2 servings.  Meat and Other Protein Foods  5-6 servings. Aim to have lean proteins, such as chicken, turkey, fish, beans, or tofu.  Dairy  3 servings. Choose low-fat or nonfat if you are trying to control your weight.  Fat  2-3 servings.  Is a serving the same thing as a portion?  No. A portion is the actual amount you eat, which may be more than one serving. Knowing the specific serving size of a food and the nutritional information that goes with it can help you make a healthy decision on what size portion to eat.  What are some tips to help me learn healthy serving sizes?  · Check food labels for serving sizes. Many foods that come as a single portion actually contain multiple servings.  · Determine the serving size of foods you commonly eat and figure out how large a portion you usually eat.  · Measure the number of servings that can be held by the bowls, glasses, cups, and plates you typically use. For example, pour your breakfast cereal into your regular bowl and then pour it into a measuring cup.  · For 1-2 days, measure the serving sizes of all the foods you eat.  · Practice estimating serving sizes and determining how big your portions should be.  This information is not intended to replace advice given to you by your health care provider. Make sure you discuss any questions you have with your health care provider.  Document Released: 09/15/2004 Document Revised: 08/12/2017 Document Reviewed: 03/17/2015  [employer] Interactive Patient Education © 2018 [employer]  Recombinant Zoster (Shingles) Vaccine, RZV: What You Need to Know  1. Why get vaccinated?  Shingles (also called herpes zoster, or just  zoster) is a painful skin rash, often with blisters. Shingles is caused by the varicella zoster virus, the same virus that causes chickenpox. After you have chickenpox, the virus stays in your body and can cause shingles later in life.  You can't catch shingles from another person. However, a person who has never had chickenpox (or chickenpox vaccine) could get chickenpox from someone with shingles.  A shingles rash usually appears on one side of the face or body and heals within 2 to 4 weeks. Its main symptom is pain, which can be severe. Other symptoms can include fever, headache, chills and upset stomach. Very rarely, a shingles infection can lead to pneumonia, hearing problems, blindness, brain inflammation (encephalitis), or death.  For about 1 person in 5, severe pain can continue even long after the rash has cleared up. This long-lasting pain is called post-herpetic neuralgia (PHN).  Shingles is far more common in people 50 years of age and older than in younger people, and the risk increases with age. It is also more common in people whose immune system is weakened because of a disease such as cancer, or by drugs such as steroids or chemotherapy.  At least 1 million people a year in the [place] get shingles.  2. Shingles vaccine (recombinant)  Recombinant shingles vaccine was approved by FDA in 2017 for the prevention of shingles. In clinical trials, it was more than 90% effective in preventing shingles. It can also reduce the likelihood of PHN.  Two doses, 2 to 6 months apart, are recommended for adults 50 and older.  This vaccine is also recommended for people who have already gotten the live shingles vaccine (Zostavax). There is no live virus in this vaccine.  3. Some people should not get this vaccine  Tell your vaccine provider if you:  · Have any severe, life-threatening allergies. A person who has ever had a life-threatening allergic reaction after a dose of recombinant shingles vaccine, or has  a severe allergy to any component of this vaccine, may be advised not to be vaccinated. Ask your health care provider if you want information about vaccine components.  · Are pregnant or breastfeeding. There is not much information about use of recombinant shingles vaccine in pregnant or nursing women. Your healthcare provider might recommend delaying vaccination.  · Are not feeling well. If you have a mild illness, such as a cold, you can probably get the vaccine today. If you are moderately or severely ill, you should probably wait until you recover. Your doctor can advise you.    4. Risks of a vaccine reaction  With any medicine, including vaccines, there is a chance of reactions.  After recombinant shingles vaccination, a person might experience:  · Pain, redness, soreness, or swelling at the site of the injection  · Headache, muscle aches, fever, shivering, fatigue    In clinical trials, most people got a sore arm with mild or moderate pain after vaccination, and some also had redness and swelling where they got the shot. Some people felt tired, had muscle pain, a headache, shivering, fever, stomach pain, or nausea. About 1 out of 6 people who got recombinant zoster vaccine experienced side effects that prevented them from doing regular activities. Symptoms went away on their own in about 2 to 3 days. Side effects were more common in younger people.  You should still get the second dose of recombinant zoster vaccine even if you had one of these reactions after the first dose.  Other things that could happen after this vaccine:  · People sometimes faint after medical procedures, including vaccination. Sitting or lying down for about 15 minutes can help prevent fainting and injuries caused by a fall. Tell your provider if you feel dizzy or have vision changes or ringing in the ears.  · Some people get shoulder pain that can be more severe and longer-lasting than routine soreness that can follow injections. This  happens very rarely.  · Any medication can cause a severe allergic reaction. Such reactions to a vaccine are estimated at about 1 in a million doses, and would happen within a few minutes to a few hours after the vaccination.  As with any medicine, there is a very remote chance of a vaccine causing a serious injury or death.  The safety of vaccines is always being monitored. For more information, visit: www.cdc.gov/vaccinesafety/  5. What if there is a serious problem?  What should I look for?  · Look for anything that concerns you, such as signs of a severe allergic reaction, very high fever, or unusual behavior.  Signs of a severe allergic reaction can include hives, swelling of the face and throat, difficulty breathing, a fast heartbeat, dizziness, and weakness. These would usually start a few minutes to a few hours after the vaccination.  What should I do?  · If you think it is a severe allergic reaction or other emergency that can't wait, call 9-1-1 and get to the nearest hospital. Otherwise, call your health care provider.  Afterward, the reaction should be reported to the Vaccine Adverse Event Reporting System (VAERS). Your doctor should file this report, or you can do it yourself through the VAERS web site atP. [employer] COMPANYww.vaers.[email or web address].govor by calling 1-463.111.5897.  [employer] does not give medical advice.  6. How can I learn more?  · Ask your healthcare provider. He or she can give you the vaccine package insert or suggest other sources of information.  · Call your local or state health department.  · Contact the Centers for Disease Control and Prevention (CDC):  ? Call 1-780.838.9331 (4-734-HRD-INFO) or  ? Visit the CDC's website at www.cdc.gov/vaccines  CDC Vaccine Information Statement (VIS) Recombinant Zoster Vaccine (02/12/2018)  This information is not intended to replace advice given to you by your health care provider. Make sure you discuss any questions you have with your health care provider.  Document Released:  02/27/2018 Document Revised: 02/27/2018 Document Reviewed: 02/27/2018  [employer] Interactive Patient Education © 2018 [employer]    For more information:    Quit Now [place]  1-800-QUIT-NOW  https://[place].quitlogix.org/en-[place]/  Steps to Quit Smoking  Smoking tobacco can be harmful to your health and can affect almost every organ in your body. Smoking puts you, and those around you, at risk for developing many serious chronic diseases. Quitting smoking is difficult, but it is one of the best things that you can do for your health. It is never too late to quit.  What are the benefits of quitting smoking?  When you quit smoking, you lower your risk of developing serious diseases and conditions, such as:  · Lung cancer or lung disease, such as COPD.  · Heart disease.  · Stroke.  · Heart attack.  · Infertility.  · Osteoporosis and bone fractures.  Additionally, symptoms such as coughing, wheezing, and shortness of breath may get better when you quit. You may also find that you get sick less often because your body is stronger at fighting off colds and infections. If you are pregnant, quitting smoking can help to reduce your chances of having a baby of low birth weight.  How do I get ready to quit?  When you decide to quit smoking, create a plan to make sure that you are successful. Before you quit:  · Pick a date to quit. Set a date within the next two weeks to give you time to prepare.  · Write down the reasons why you are quitting. Keep this list in places where you will see it often, such as on your bathroom mirror or in your car or wallet.  · Identify the people, places, things, and activities that make you want to smoke (triggers) and avoid them. Make sure to take these actions:  ¨ Throw away all cigarettes at home, at work, and in your car.  ¨ Throw away smoking accessories, such as ashtrays and lighters.  ¨ Clean your car and make sure to empty the ashtray.  ¨ Clean your home, including curtains and  carpets.  · Tell your family, friends, and coworkers that you are quitting. Support from your loved ones can make quitting easier.  · Talk with your health care provider about your options for quitting smoking.  · Find out what treatment options are covered by your health insurance.  What strategies can I use to quit smoking?  Talk with your healthcare provider about different strategies to quit smoking. Some strategies include:  · Quitting smoking altogether instead of gradually lessening how much you smoke over a period of time. Research shows that quitting “cold turkey” is more successful than gradually quitting.  · Attending in-person counseling to help you build problem-solving skills. You are more likely to have success in quitting if you attend several counseling sessions. Even short sessions of 10 minutes can be effective.  · Finding resources and support systems that can help you to quit smoking and remain smoke-free after you quit. These resources are most helpful when you use them often. They can include:  ¨ Online chats with a counselor.  ¨ Telephone quitlines.  ¨ Printed self-help materials.  ¨ Support groups or group counseling.  ¨ Text messaging programs.  ¨ Mobile phone applications.  · Taking medicines to help you quit smoking. (If you are pregnant or breastfeeding, talk with your health care provider first.) Some medicines contain nicotine and some do not. Both types of medicines help with cravings, but the medicines that include nicotine help to relieve withdrawal symptoms. Your health care provider may recommend:  ¨ Nicotine patches, gum, or lozenges.  ¨ Nicotine inhalers or sprays.  ¨ Non-nicotine medicine that is taken by mouth.  Talk with your health care provider about combining strategies, such as taking medicines while you are also receiving in-person counseling. Using these two strategies together makes you more likely to succeed in quitting than if you used either strategy on its own.  If  you are pregnant or breastfeeding, talk with your health care provider about finding counseling or other support strategies to quit smoking. Do not take medicine to help you quit smoking unless told to do so by your health care provider.  What things can I do to make it easier to quit?  Quitting smoking might feel overwhelming at first, but there is a lot that you can do to make it easier. Take these important actions:  · Reach out to your family and friends and ask that they support and encourage you during this time. Call telephone quitlines, reach out to support groups, or work with a counselor for support.  · Ask people who smoke to avoid smoking around you.  · Avoid places that trigger you to smoke, such as bars, parties, or smoke-break areas at work.  · Spend time around people who do not smoke.  · Lessen stress in your life, because stress can be a smoking trigger for some people. To lessen stress, try:  ¨ Exercising regularly.  ¨ Deep-breathing exercises.  ¨ Yoga.  ¨ Meditating.  ¨ Performing a body scan. This involves closing your eyes, scanning your body from head to toe, and noticing which parts of your body are particularly tense. Purposefully relax the muscles in those areas.  · Download or purchase mobile phone or tablet apps (applications) that can help you stick to your quit plan by providing reminders, tips, and encouragement. There are many free apps, such as QuitGuide from the CDC (Centers for Disease Control and Prevention). You can find other support for quitting smoking (smoking cessation) through smokefree.gov and other websites.  How will I feel when I quit smoking?  Within the first 24 hours of quitting smoking, you may start to feel some withdrawal symptoms. These symptoms are usually most noticeable 2-3 days after quitting, but they usually do not last beyond 2-3 weeks. Changes or symptoms that you might experience include:  · Mood swings.  · Restlessness, anxiety, or  irritation.  · Difficulty concentrating.  · Dizziness.  · Strong cravings for sugary foods in addition to nicotine.  · Mild weight gain.  · Constipation.  · Nausea.  · Coughing or a sore throat.  · Changes in how your medicines work in your body.  · A depressed mood.  · Difficulty sleeping (insomnia).  After the first 2-3 weeks of quitting, you may start to notice more positive results, such as:  · Improved sense of smell and taste.  · Decreased coughing and sore throat.  · Slower heart rate.  · Lower blood pressure.  · Clearer skin.  · The ability to breathe more easily.  · Fewer sick days.  Quitting smoking is very challenging for most people. Do not get discouraged if you are not successful the first time. Some people need to make many attempts to quit before they achieve long-term success. Do your best to stick to your quit plan, and talk with your health care provider if you have any questions or concerns.  This information is not intended to replace advice given to you by your health care provider. Make sure you discuss any questions you have with your health care provider.  Document Released: 12/12/2002 Document Revised: 08/15/2017 Document Reviewed: 05/03/2016  [employer] Interactive Patient Education © 2017 [employer]

## 2018-11-06 NOTE — PROGRESS NOTES
"11/06/2018  Chief Complaint   Patient presents with   • Gynecologic Exam     Annual Physical with PAP         Lea Thompsno is here for her annual preventive exam.    Lea Thompson has the following medical history:  Patient Active Problem List    Diagnosis   • HUGO (obstructive sleep apnea) [G47.33]   • History of pancreatitis [Z87.19]   • History of colon polyps [Z86.010]   • Tobacco abuse [Z72.0]   • Class 3 severe obesity without serious comorbidity with body mass index (BMI) of 50.0 to 59.9 in adult (CMS/[facility]) [E66.01, Z68.43]         Patient does not follow a healthy, well balanced diet.   Patient is not exercising.    Lea Thompson is not up to date on eye exam.  she is up to date on dental exam.    Screenings: up to date on mammogram, pap today    Vaccinations: received influenza and pneumonia shots at work last month. Has not had Shingrix. Not due for Tdap until 2024    Vitals:    11/06/18 0839   BP: 120/70   Pulse: 92   Temp: 98.4 °F (36.9 °C)   SpO2: 98%   Weight: (!) 142 kg (313 lb 8 oz)   Height: 163.8 cm (64.49\")     Patient's Body mass index is 53 kg/m². BMI is above normal parameters. Recommendations include: educational material and nutrition counseling.      Review of Systems   Constitutional: Negative for activity change.   Cardiovascular: Negative for chest pain.   Gastrointestinal: Negative for abdominal pain.   All other systems reviewed and are negative.      Physical Exam   Constitutional: She is oriented to person, place, and time. Vital signs are normal. She appears well-developed and well-nourished. She is active.  Non-toxic appearance. She does not have a sickly appearance. She does not appear ill. No distress. She is morbidly obese.  HENT:   Head: Normocephalic and atraumatic. Hair is normal.   Right Ear: Hearing, tympanic membrane, external ear and ear canal normal.   Left Ear: Hearing, tympanic membrane, external ear and ear canal normal.   Nose: Nose normal.   Mouth/Throat: " Uvula is midline, oropharynx is clear and moist and mucous membranes are normal. Normal dentition.   Small narrow ear canals, more so on left   Eyes: Pupils are equal, round, and reactive to light. Conjunctivae, EOM and lids are normal. No scleral icterus.   Neck: Trachea normal and phonation normal. Neck supple.   Cardiovascular: Normal rate, regular rhythm, normal heart sounds and intact distal pulses.    Pulmonary/Chest: Effort normal and breath sounds normal. She exhibits no deformity.   Abdominal: Soft. Bowel sounds are normal.   Genitourinary: Vagina normal. Pelvic exam was performed with patient supine. There is no rash, tenderness, lesion or injury on the right labia. There is no rash, tenderness, lesion or injury on the left labia. Cervix exhibits nabothian cyst (4:00). Cervix does not exhibit motion tenderness, friability or polyp.   Genitourinary Comments: Cherry Higgins, GABRIEL, Chaperone  Bimanual exam deferred due to body habitus, limited ability to palpate adnexa   Musculoskeletal: She exhibits no edema, tenderness or deformity.   Lymphadenopathy:        Head (right side): No submandibular adenopathy present.        Head (left side): No submandibular adenopathy present.     She has no cervical adenopathy.   Neurological: She is alert and oriented to person, place, and time. She has normal strength. She displays no tremor. No cranial nerve deficit. She exhibits normal muscle tone. She displays no seizure activity. Gait normal.   Skin: Skin is warm. Capillary refill takes less than 2 seconds. Turgor is normal. No rash noted. She is not diaphoretic. No cyanosis. No pallor. Nails show no clubbing.   Psychiatric: She has a normal mood and affect. Her speech is normal and behavior is normal. Judgment and thought content normal. Cognition and memory are normal. She is attentive.   Nursing note and vitals reviewed.      Health Maintenance   Topic Date Due   • ANNUAL PHYSICAL  05/08/1970   • PAP SMEAR   03/29/2017   • ZOSTER VACCINE (1 of 2) 05/08/2017   • LIPID PANEL  04/04/2018   • TDAP/TD VACCINES (1 - Tdap) 01/01/2024 (Originally 5/8/1984)   • MAMMOGRAM  05/04/2020   • COLONOSCOPY  04/04/2028   • PNEUMOCOCCAL VACCINE (19-64 MEDIUM RISK)  Completed   • INFLUENZA VACCINE  Completed       Problem List Items Addressed This Visit        Digestive    Class 3 severe obesity without serious comorbidity with body mass index (BMI) of 50.0 to 59.9 in adult (CMS/[facility])       Other    Tobacco abuse      Other Visit Diagnoses     Annual physical exam    -  Primary    Relevant Orders    Hemoglobin A1c    CBC & Differential    Comprehensive Metabolic Panel    TSH    T4, Free    Lipid Panel    Pap smear for cervical cancer screening        Colon cancer screening        Relevant Orders    Ambulatory Referral For Screening Colonoscopy    Screening, lipid        Relevant Orders    Lipid Panel    Thyroid disorder screening        Relevant Orders    TSH    T4, Free    Hyperglycemia        Noted on labs 11/2017.    Relevant Orders    Hemoglobin A1c          · Discussed healthy diet and encouraged exercise. Health maintenance information provided with patient plan as well as info on portion sizes and tobacco cessation  · Discussed shingrix    Return in about 1 year (around 11/6/2019) for Annual physical.    Marianela Espinal MD

## 2019-01-03 ENCOUNTER — TELEPHONE (OUTPATIENT)
Dept: SURGERY | Facility: CLINIC | Age: 54
End: 2019-01-03

## 2019-01-03 NOTE — TELEPHONE ENCOUNTER
Patient would like to cancel colonoscopy scheduled for this Monday.  She said she would call back to reschedule

## 2019-05-21 ENCOUNTER — PATIENT MESSAGE (OUTPATIENT)
Dept: INTERNAL MEDICINE | Facility: CLINIC | Age: 54
End: 2019-05-21

## 2019-05-21 ENCOUNTER — TELEPHONE (OUTPATIENT)
Dept: INTERNAL MEDICINE | Facility: CLINIC | Age: 54
End: 2019-05-21

## 2019-05-21 NOTE — TELEPHONE ENCOUNTER
Regarding: Referral Request  Contact: [phone]  ----- Message from Mychart, Generic sent at 5/21/2019  3:48 PM EDT -----    Good Afternoon Dr. Espinal,   I hope you are doing well.   I am having a problem with my toenail on my left foot (big toe) the nail is curving and causing an ingrown that I have been unable to treat on my own. Should I be referred to a podiatrist to have them look at and  determine what needs to be done?  If so, do you have someone to recommend for me to see?     Thank you for your time.   Lea Thompson

## 2019-11-07 ENCOUNTER — OFFICE VISIT (OUTPATIENT)
Dept: INTERNAL MEDICINE | Facility: CLINIC | Age: 54
End: 2019-11-07

## 2019-11-07 VITALS
HEIGHT: 64 IN | HEART RATE: 94 BPM | RESPIRATION RATE: 16 BRPM | BODY MASS INDEX: 50.02 KG/M2 | SYSTOLIC BLOOD PRESSURE: 132 MMHG | TEMPERATURE: 97.8 F | WEIGHT: 293 LBS | OXYGEN SATURATION: 97 % | DIASTOLIC BLOOD PRESSURE: 74 MMHG

## 2019-11-07 DIAGNOSIS — Z00.00 ANNUAL PHYSICAL EXAM: Primary | ICD-10-CM

## 2019-11-07 DIAGNOSIS — E88.81 INSULIN RESISTANCE: ICD-10-CM

## 2019-11-07 DIAGNOSIS — N76.0 BV (BACTERIAL VAGINOSIS): ICD-10-CM

## 2019-11-07 DIAGNOSIS — B96.89 BV (BACTERIAL VAGINOSIS): ICD-10-CM

## 2019-11-07 DIAGNOSIS — E66.01 CLASS 3 SEVERE OBESITY DUE TO EXCESS CALORIES WITHOUT SERIOUS COMORBIDITY WITH BODY MASS INDEX (BMI) OF 50.0 TO 59.9 IN ADULT (HCC): ICD-10-CM

## 2019-11-07 DIAGNOSIS — Z72.0 TOBACCO ABUSE: ICD-10-CM

## 2019-11-07 DIAGNOSIS — E53.8 VITAMIN B12 DEFICIENCY: ICD-10-CM

## 2019-11-07 DIAGNOSIS — Z12.39 BREAST CANCER SCREENING: ICD-10-CM

## 2019-11-07 DIAGNOSIS — R73.9 HYPERGLYCEMIA: ICD-10-CM

## 2019-11-07 DIAGNOSIS — Z71.3 WEIGHT LOSS COUNSELING, ENCOUNTER FOR: ICD-10-CM

## 2019-11-07 DIAGNOSIS — E78.49 OTHER HYPERLIPIDEMIA: ICD-10-CM

## 2019-11-07 PROCEDURE — 99396 PREV VISIT EST AGE 40-64: CPT | Performed by: FAMILY MEDICINE

## 2019-11-07 RX ORDER — METRONIDAZOLE 500 MG/1
500 TABLET ORAL 2 TIMES DAILY
Qty: 14 TABLET | Refills: 0 | Status: SHIPPED | OUTPATIENT
Start: 2019-11-07 | End: 2019-11-14

## 2019-11-07 NOTE — PROGRESS NOTES
11/07/2019  Chief Complaint   Patient presents with   • Annual Exam     physical pt states that she has had a spot on the back of her neck come up that she wants checked out          Lea Thompson is here for her annual preventive exam.    Bump on back close to neck x few days. Not painful, she can't see it.    Has lost weight previously with metformin though had a lot of gastro side effects. Was told insulin resistant by a weight loss clinic. She does not eat excessively nor binge eat but makes poor choices she says. Working on this, has cut sugar out of coffee. Has been told she's vitamin B12 deficient in the past and she believes Dr. guerrero gave her vitamin B12 shots.     She has frequent headaches, no history of nephrolithiasis. No known thyroid cancer in family. Patient has had pancreatitis, though.    Was in a hot tub last week on vacation and now has vaginal odor like when she has bacterial vaginosis. No itching.       Lea Thompson has the following medical issues:  Patient Active Problem List    Diagnosis   • HUGO (obstructive sleep apnea) [G47.33]   • History of pancreatitis [Z87.19]   • History of colon polyps [Z86.010]   • Tobacco abuse [Z72.0]   • Class 3 severe obesity without serious comorbidity with body mass index (BMI) of 50.0 to 59.9 in adult (CMS/[facility]) [E66.01, Z68.43]       Health Maintenance   Topic Date Due   • LIPID PANEL  11/30/2019 (Originally 11/6/2019)   • ZOSTER VACCINE (1 of 2) 11/15/2020 (Originally 5/8/2017)   • TDAP/TD VACCINES (1 - Tdap) 01/01/2024 (Originally 5/8/1984)   • MAMMOGRAM  05/04/2020   • ANNUAL PHYSICAL  11/08/2020   • PAP SMEAR  11/06/2021   • COLONOSCOPY  04/04/2028   • PNEUMOCOCCAL VACCINE (19-64 MEDIUM RISK)  Completed   • INFLUENZA VACCINE  Completed       Immunization History   Administered Date(s) Administered   • Flu Vaccine Quad PF >18YRS 10/17/2018, 10/16/2019   • Pneumococcal Polysaccharide (PPSV23) 10/17/2018     Visit Vitals  /74   Pulse 94   Temp  "97.8 °F (36.6 °C) (Temporal)   Resp 16   Ht 162.6 cm (64\")   Wt (!) 150 kg (331 lb 2 oz)   LMP 05/27/2016   SpO2 97%   BMI 56.84 kg/m²       Patient's Body mass index is 56.84 kg/m². BMI is above normal parameters. Recommendations include: educational material, exercise counseling and pharmacological intervention.      Review of Systems   Constitutional: Negative for activity change and appetite change.   Respiratory: Negative for shortness of breath.    Cardiovascular: Negative for chest pain.   Gastrointestinal: Negative for abdominal pain.   Genitourinary: Negative for dysuria.   Psychiatric/Behavioral: Negative for depressed mood.   All other systems reviewed and are negative.      Physical Exam   Constitutional: She is oriented to person, place, and time. Vital signs are normal. She appears well-developed and well-nourished. She is active.  Non-toxic appearance. She does not have a sickly appearance. She does not appear ill. No distress. She is morbidly obese.  HENT:   Head: Normocephalic and atraumatic. Hair is normal.   Right Ear: Hearing, tympanic membrane, external ear and ear canal normal.   Left Ear: Hearing, tympanic membrane, external ear and ear canal normal.   Nose: Nose normal.   Mouth/Throat: Uvula is midline, oropharynx is clear and moist and mucous membranes are normal. Normal dentition. No oropharyngeal exudate.   Eyes: Conjunctivae, EOM and lids are normal. Pupils are equal, round, and reactive to light. Right eye exhibits no discharge. Left eye exhibits no discharge. No scleral icterus.   Neck: Trachea normal and phonation normal. Neck supple. No tracheal deviation present. No thyromegaly present.   Cardiovascular: Normal rate, regular rhythm and normal heart sounds. Exam reveals no gallop and no friction rub.   No murmur heard.  Pulmonary/Chest: Effort normal and breath sounds normal. She exhibits no tenderness.   Abdominal: Soft. Bowel sounds are normal. She exhibits no distension and no " mass. There is no hepatosplenomegaly. There is no tenderness. There is no rigidity, no rebound and no guarding.   Musculoskeletal: She exhibits no edema, tenderness or deformity.   Lymphadenopathy:        Head (right side): No submandibular adenopathy present.        Head (left side): No submandibular adenopathy present.     She has no cervical adenopathy.   Neurological: She is alert and oriented to person, place, and time. She has normal strength. She displays no atrophy and no tremor. No cranial nerve deficit. She exhibits normal muscle tone. She displays no seizure activity. Gait normal.   Skin: Skin is warm and intact. Capillary refill takes less than 2 seconds. Turgor is normal. No rash noted. She is not diaphoretic. No cyanosis. No pallor. Nails show no clubbing.        Psychiatric: She has a normal mood and affect. Her speech is normal and behavior is normal. Judgment and thought content normal. Cognition and memory are normal.   Nursing note and vitals reviewed.        Problem List Items Addressed This Visit        Digestive    Class 3 severe obesity without serious comorbidity with body mass index (BMI) of 50.0 to 59.9 in adult (CMS/HCC)    Relevant Orders    TSH+Free T4       Other    Tobacco abuse      Other Visit Diagnoses     Annual physical exam    -  Primary    BV (bacterial vaginosis)        Relevant Medications    metroNIDAZOLE (FLAGYL) 500 MG tablet    Hyperglycemia        Relevant Orders    Hemoglobin A1c    Insulin, Random    Other hyperlipidemia        Relevant Orders    Lipid Panel    Insulin resistance        Relevant Orders    Hemoglobin A1c    Comprehensive Metabolic Panel    Insulin, Random    Vitamin B12 deficiency        Relevant Orders    CBC (No Diff)    Vitamin B12    Breast cancer screening        Relevant Orders    Mammo Screening Digital Tomosynthesis Bilateral With CAD    Weight loss counseling, encounter for              · Health maintenance information provided with patient  plan.   · Counseled on age appropriate health screenings.  · Encouraged Shingrix @ pharmacy  · Discussed medicine for weight loss, list given for her to consider  · Contrave or Wellbutrin may be good option given tobacco abuse.     Return in about 368 days (around 11/9/2020) for Annual physical or sooner if needed.    Marianela Espinal MD

## 2019-11-07 NOTE — PATIENT INSTRUCTIONS
Health Maintenance, Female  Adopting a healthy lifestyle and getting preventive care can go a long way to promote health and wellness. Talk with your health care provider about what schedule of regular examinations is right for you. This is a good chance for you to check in with your provider about disease prevention and staying healthy.  In between checkups, there are plenty of things you can do on your own. Experts have done a lot of research about which lifestyle changes and preventive measures are most likely to keep you healthy. Ask your health care provider for more information.  Weight and diet  Eat a healthy diet  · Be sure to include plenty of vegetables, fruits, low-fat dairy products, and lean protein.  · Do not eat a lot of foods high in solid fats, added sugars, or salt.  · Get regular exercise. This is one of the most important things you can do for your health.  ? Most adults should exercise for at least 150 minutes each week. The exercise should increase your heart rate and make you sweat (moderate-intensity exercise).  ? Most adults should also do strengthening exercises at least twice a week. This is in addition to the moderate-intensity exercise.     Maintain a healthy weight  · Body mass index (BMI) is a measurement that can be used to identify possible weight problems. It estimates body fat based on height and weight. Your health care provider can help determine your BMI and help you achieve or maintain a healthy weight.  · For females 20 years of age and older:  ? A BMI below 18.5 is considered underweight.  ? A BMI of 18.5 to 24.9 is normal.  ? A BMI of 25 to 29.9 is considered overweight.  ? A BMI of 30 and above is considered obese.     Watch levels of cholesterol and blood lipids  · You should start having your blood tested for lipids and cholesterol at 20 years of age, then have this test every 5 years.  · You may need to have your cholesterol levels checked more often if:  ? Your lipid or  cholesterol levels are high.  ? You are older than 50 years of age.  ? You are at high risk for heart disease.     Cancer screening  Lung Cancer  · Lung cancer screening is recommended for adults 55-80 years old who are at high risk for lung cancer because of a history of smoking.  · A yearly low-dose CT scan of the lungs is recommended for people who:  ? Currently smoke.  ? Have quit within the past 15 years.  ? Have at least a 30-pack-year history of smoking. A pack year is smoking an average of one pack of cigarettes a day for 1 year.  · Yearly screening should continue until it has been 15 years since you quit.  · Yearly screening should stop if you develop a health problem that would prevent you from having lung cancer treatment.     Breast Cancer  · Practice breast self-awareness. This means understanding how your breasts normally appear and feel.  · It also means doing regular breast self-exams. Let your health care provider know about any changes, no matter how small.  · If you are in your 20s or 30s, you should have a clinical breast exam (CBE) by a health care provider every 1-3 years as part of a regular health exam.  · If you are 40 or older, have a CBE every year. Also consider having a breast X-ray (mammogram) every year.  · If you have a family history of breast cancer, talk to your health care provider about genetic screening.  · If you are at high risk for breast cancer, talk to your health care provider about having an MRI and a mammogram every year.  · Breast cancer gene (BRCA) assessment is recommended for women who have family members with BRCA-related cancers. BRCA-related cancers include:  ? Breast.  ? Ovarian.  ? Tubal.  ? Peritoneal cancers.  · Results of the assessment will determine the need for genetic counseling and BRCA1 and BRCA2 testing.     Cervical Cancer  Your health care provider may recommend that you be screened regularly for cancer of the pelvic organs (ovaries, uterus, and  vagina). This screening involves a pelvic examination, including checking for microscopic changes to the surface of your cervix (Pap test). You may be encouraged to have this screening done every 3 years, beginning at age 21.  · For women ages 30-65, health care providers may recommend pelvic exams and Pap testing every 3 years, or they may recommend the Pap and pelvic exam, combined with testing for human papilloma virus (HPV), every 5 years. Some types of HPV increase your risk of cervical cancer. Testing for HPV may also be done on women of any age with unclear Pap test results.  · Other health care providers may not recommend any screening for nonpregnant women who are considered low risk for pelvic cancer and who do not have symptoms. Ask your health care provider if a screening pelvic exam is right for you.  · If you have had past treatment for cervical cancer or a condition that could lead to cancer, you need Pap tests and screening for cancer for at least 20 years after your treatment. If Pap tests have been discontinued, your risk factors (such as having a new sexual partner) need to be reassessed to determine if screening should resume. Some women have medical problems that increase the chance of getting cervical cancer. In these cases, your health care provider may recommend more frequent screening and Pap tests.     Colorectal Cancer  · This type of cancer can be detected and often prevented.  · Routine colorectal cancer screening usually begins at 50 years of age and continues through 75 years of age.  · Your health care provider may recommend screening at an earlier age if you have risk factors for colon cancer.  · Your health care provider may also recommend using home test kits to check for hidden blood in the stool.  · A small camera at the end of a tube can be used to examine your colon directly (sigmoidoscopy or colonoscopy). This is done to check for the earliest forms of colorectal  cancer.  · Routine screening usually begins at age 50.  · Direct examination of the colon should be repeated every 5-10 years through 75 years of age. However, you may need to be screened more often if early forms of precancerous polyps or small growths are found.     Skin Cancer  · Check your skin from head to toe regularly.  · Tell your health care provider about any new moles or changes in moles, especially if there is a change in a mole's shape or color.  · Also tell your health care provider if you have a mole that is larger than the size of a pencil eraser.  · Always use sunscreen. Apply sunscreen liberally and repeatedly throughout the day.  · Protect yourself by wearing long sleeves, pants, a wide-brimmed hat, and sunglasses whenever you are outside.     Heart disease, diabetes, and high blood pressure  · High blood pressure causes heart disease and increases the risk of stroke. High blood pressure is more likely to develop in:  ? People who have blood pressure in the high end of the normal range (130-139/85-89 mm Hg).  ? People who are overweight or obese.  ? People who are .  · If you are 18-39 years of age, have your blood pressure checked every 3-5 years. If you are 40 years of age or older, have your blood pressure checked every year. You should have your blood pressure measured twice--once when you are at a hospital or clinic, and once when you are not at a hospital or clinic. Record the average of the two measurements. To check your blood pressure when you are not at a hospital or clinic, you can use:  ? An automated blood pressure machine at a pharmacy.  ? A home blood pressure monitor.  · If you are between 55 years and 79 years old, ask your health care provider if you should take aspirin to prevent strokes.  · Have regular diabetes screenings. This involves taking a blood sample to check your fasting blood sugar level.  ? If you are at a normal weight and have a low risk for  diabetes, have this test once every three years after 45 years of age.  ? If you are overweight and have a high risk for diabetes, consider being tested at a younger age or more often.  Preventing infection  Hepatitis B  · If you have a higher risk for hepatitis B, you should be screened for this virus. You are considered at high risk for hepatitis B if:  ? You were born in a country where hepatitis B is common. Ask your health care provider which countries are considered high risk.  ? Your parents were born in a high-risk country, and you have not been immunized against hepatitis B (hepatitis B vaccine).  ? You have HIV or AIDS.  ? You use needles to inject street drugs.  ? You live with someone who has hepatitis B.  ? You have had sex with someone who has hepatitis B.  ? You get hemodialysis treatment.  ? You take certain medicines for conditions, including cancer, organ transplantation, and autoimmune conditions.     Hepatitis C  · Blood testing is recommended for:  ? Everyone born from 1945 through 1965.  ? Anyone with known risk factors for hepatitis C.     Sexually transmitted infections (STIs)  · You should be screened for sexually transmitted infections (STIs) including gonorrhea and chlamydia if:  ? You are sexually active and are younger than 24 years of age.  ? You are older than 24 years of age and your health care provider tells you that you are at risk for this type of infection.  ? Your sexual activity has changed since you were last screened and you are at an increased risk for chlamydia or gonorrhea. Ask your health care provider if you are at risk.  · If you do not have HIV, but are at risk, it may be recommended that you take a prescription medicine daily to prevent HIV infection. This is called pre-exposure prophylaxis (PrEP). You are considered at risk if:  ? You are sexually active and do not regularly use condoms or know the HIV status of your partner(s).  ? You take drugs by injection.  ? You  are sexually active with a partner who has HIV.     Talk with your health care provider about whether you are at high risk of being infected with HIV. If you choose to begin PrEP, you should first be tested for HIV. You should then be tested every 3 months for as long as you are taking PrEP.  Pregnancy  · If you are premenopausal and you may become pregnant, ask your health care provider about preconception counseling.  · If you may become pregnant, take 400 to 800 micrograms (mcg) of folic acid every day.  · If you want to prevent pregnancy, talk to your health care provider about birth control (contraception).  Osteoporosis and menopause  · Osteoporosis is a disease in which the bones lose minerals and strength with aging. This can result in serious bone fractures. Your risk for osteoporosis can be identified using a bone density scan.  · If you are 65 years of age or older, or if you are at risk for osteoporosis and fractures, ask your health care provider if you should be screened.  · Ask your health care provider whether you should take a calcium or vitamin D supplement to lower your risk for osteoporosis.  · Menopause may have certain physical symptoms and risks.  · Hormone replacement therapy may reduce some of these symptoms and risks.  Talk to your health care provider about whether hormone replacement therapy is right for you.  Follow these instructions at home:  · Schedule regular health, dental, and eye exams.  · Stay current with your immunizations.  · Do not use any tobacco products including cigarettes, chewing tobacco, or electronic cigarettes.  · If you are pregnant, do not drink alcohol.  · If you are breastfeeding, limit how much and how often you drink alcohol.  · Limit alcohol intake to no more than 1 drink per day for nonpregnant women. One drink equals 12 ounces of beer, 5 ounces of wine, or 1½ ounces of hard liquor.  · Do not use street drugs.  · Do not share needles.  · Ask your health care  provider for help if you need support or information about quitting drugs.  · Tell your health care provider if you often feel depressed.  · Tell your health care provider if you have ever been abused or do not feel safe at home.  This information is not intended to replace advice given to you by your health care provider. Make sure you discuss any questions you have with your health care provider.  Document Released: 07/02/2012 Document Revised: 05/25/2017 Document Reviewed: 09/20/2016  [employer] Interactive Patient Education © 2018 [employer]       Serving Sizes  A serving size is a measured amount of food or drink, such as one slice of bread, that has an associated nutrient content. Knowing the serving size of a food or drink can help you determine how much of that food you should consume.  What is the size of one serving?  The size of one healthy serving depends on the food or drink. To determine a serving size, read the food label. If the food or drink does not have a food label, try to find serving size information online. Or, use the following to estimate the size of one adult serving:  Grain  1 slice bread. ½ bagel. ½ cup pasta.  Vegetable  ½ cup cooked or canned vegetables. 1 cup raw, leafy greens.  Fruit  ½ cup canned fruit. 1 medium fruit. ¼ cup dried fruit.  Meat and Other Protein Sources  1 oz meat, poultry, or fish. ¼ cup cooked beans. 1 egg. ¼ cup nuts or seeds. 1 Tbsp nut butter. ¼ cup tofu or tempeh. 2 Tbsp hummus.  Dairy  An individual container of yogurt (6-8 oz). 1 piece of cheese the size of your thumb (1 oz). 1 cup (8 oz) milk or milk alternative.  Fat  A piece the size of one dice. 1 tsp soft margarine. 1 Tbsp mayonnaise. 1 tsp vegetable oil. 1 Tbsp regular salad dressing. 2 Tbsp low-fat salad dressing.  How many servings should I eat from each food group each day?  The following are the suggested number of servings to try and have every day from each food group. You can also look at your  eating throughout the week and aim for meeting these requirements on most days for overall healthy eating.  Grain  6-8 servings. Try to have half of your grains from whole grains, such as whole wheat bread, corn tortillas, oatmeal, brown rice, whole wheat pasta, and bulgur.  Vegetable  At least 2½-3 servings.  Fruit  2 servings.  Meat and Other Protein Foods  5-6 servings. Aim to have lean proteins, such as chicken, turkey, fish, beans, or tofu.  Dairy  3 servings. Choose low-fat or nonfat if you are trying to control your weight.  Fat  2-3 servings.  Is a serving the same thing as a portion?  No. A portion is the actual amount you eat, which may be more than one serving. Knowing the specific serving size of a food and the nutritional information that goes with it can help you make a healthy decision on what size portion to eat.  What are some tips to help me learn healthy serving sizes?  · Check food labels for serving sizes. Many foods that come as a single portion actually contain multiple servings.  · Determine the serving size of foods you commonly eat and figure out how large a portion you usually eat.  · Measure the number of servings that can be held by the bowls, glasses, cups, and plates you typically use. For example, pour your breakfast cereal into your regular bowl and then pour it into a measuring cup.  · For 1-2 days, measure the serving sizes of all the foods you eat.  · Practice estimating serving sizes and determining how big your portions should be.      This information is not intended to replace advice given to you by your health care provider. Make sure you discuss any questions you have with your health care provider.  Document Released: 09/15/2004 Document Revised: 08/12/2017 Document Reviewed: 03/17/2015  [email or web address] Interactive Patient Education © 2018 [email or web address]    MyPlate from [employer]    The general, healthful diet is based on the 2010 Dietary Guidelines for Americans. The amount of food you  need to eat from each food group depends on your age, sex, and level of physical activity and can be individualized by a dietitian. Go to ChooseMyPlate.gov for more information.  What do I need to know about the MyPlate plan?  · Enjoy your food, but eat less.  · Avoid oversized portions.  ? ½ of your plate should include fruits and vegetables.  ? ¼ of your plate should be grains.  ? ¼ of your plate should be protein.  Grains  · Make at least half of your grains whole grains.  · For a 2,000 calorie daily food plan, eat 6 oz every day.  · 1 oz is about 1 slice bread, 1 cup cereal, or ½ cup cooked rice, cereal, or pasta.  Vegetables  · Make half your plate fruits and vegetables.  · For a 2,000 calorie daily food plan, eat 2½ cups every day.  · 1 cup is about 1 cup raw or cooked vegetables or vegetable juice or 2 cups raw leafy greens.  Fruits  · Make half your plate fruits and vegetables.  · For a 2,000 calorie daily food plan, eat 2 cups every day.  · 1 cup is about 1 cup fruit or 100% fruit juice or ½ cup dried fruit.  Protein  · For a 2,000 calorie daily food plan, eat 5½ oz every day.  · 1 oz is about 1 oz meat, poultry, or fish, ¼ cup cooked beans, 1 egg, 1 Tbsp peanut butter, or ½ oz nuts or seeds.  Dairy  · Switch to fat-free or low-fat (1%) milk.  · For a 2,000 calorie daily food plan, eat 3 cups every day.  · 1 cup is about 1 cup milk or yogurt or soy milk (soy beverage), 1½ oz natural cheese, or 2 oz processed cheese.  Fats, Oils, and Empty Calories  · Only small amounts of oils are recommended.  · Empty calories are calories from solid fats or added sugars.  · Compare sodium in foods like soup, bread, and frozen meals. Choose the foods with lower numbers.  · Drink water instead of sugary drinks.  What foods can I eat?  Grains  Whole grains such as whole wheat, quinoa, millet, and bulgur. Bread, rolls, and pasta made from whole grains. Brown or wild rice. Hot or cold cereals made from whole grains and without  added sugar.  Vegetables  All fresh vegetables, especially fresh red, dark green, or orange vegetables. Peas and beans. Low-sodium frozen or canned vegetables prepared without added salt. Low-sodium vegetable juices.  Fruits  All fresh, frozen, and dried fruits. Canned fruit packed in water or fruit juice without added sugar. Fruit juices without added sugar.  Meats and Other Protein Sources  Boiled, baked, or grilled lean meat trimmed of fat. Skinless poultry. Fresh seafood and shellfish. Canned seafood packed in water. Unsalted nuts and unsalted nut butters. Tofu. Dried beans and pea. Eggs.  Dairy  Low-fat or fat-free milk, yogurt, and cheeses.  Sweets and Desserts  Frozen desserts made from low-fat milk.  Fats and Oils  Olive, peanut, and canola oils and margarine. Salad dressing and mayonnaise made from these oils.  Other  Soups and casseroles made from allowed ingredients and without added fat or salt.  The items listed above may not be a complete list of recommended foods or beverages. Contact your dietitian for more options.  What foods are not recommended?  Grains  Sweetened, low-fiber cereals. Packaged baked goods. Snack crackers and chips. Cheese crackers, butter crackers, and biscuits. Frozen waffles, sweet breads, doughnuts, pastries, packaged baking mixes, pancakes, cakes, and cookies.  Vegetables  Regular canned or frozen vegetables or vegetables prepared with salt. Canned tomatoes. Canned tomato sauce. Fried vegetables. Vegetables in cream sauce or cheese sauce.  Fruits  Fruits packed in syrup or made with added sugar.  Meats and Other Protein Sources  Marbled or fatty meats such as ribs. Poultry with skin. Fried meats, poultry, eggs, or fish. Sausages, hot dogs, and deli meats such as pastrami, bologna, or salami.  Dairy  Whole milk, cream, cheeses made from whole milk, sour cream. Ice cream or yogurt made from whole milk or with added sugar.  Beverages  For adults, no more than one alcoholic drink  per day. Regular soft drinks or other sugary beverages. Juice drinks.  Sweets and Desserts  Sugary or fatty desserts, candy, and other sweets.  Fats and Oils  Solid shortening or partially hydrogenated oils. Solid margarine. Margarine that contains trans fats. Butter.    The items listed above may not be a complete list of foods and beverages to avoid. Contact your dietitian for more information.    This information is not intended to replace advice given to you by your health care provider. Make sure you discuss any questions you have with your health care provider.  Document Released: 01/06/2009 Document Revised: 05/25/2017 Document Reviewed: 11/26/2014  [employer] Interactive Patient Education © 2018 [employer]        Calorie Counting for Weight Loss  Calories are units of energy. Your body needs a certain amount of calories from food to keep you going throughout the day. When you eat more calories than your body needs, your body stores the extra calories as fat. When you eat fewer calories than your body needs, your body burns fat to get the energy it needs.  Calorie counting means keeping track of how many calories you eat and drink each day. Calorie counting can be helpful if you need to lose weight. If you make sure to eat fewer calories than your body needs, you should lose weight. Ask your health care provider what a healthy weight is for you.  For calorie counting to work, you will need to eat the right number of calories in a day in order to lose a healthy amount of weight per week. A dietitian can help you determine how many calories you need in a day and will give you suggestions on how to reach your calorie goal.  · A healthy amount of weight to lose per week is usually 1-2 lb (0.5-0.9 kg). This usually means that your daily calorie intake should be reduced by 500-750 calories.  · Eating 1,200 - 1,500 calories per day can help most women lose weight.  · Eating 1,500 - 1,800 calories per day can help  most men lose weight.     What do I need to know about calorie counting?  In order to meet your daily calorie goal, you will need to:  · Find out how many calories are in each food you would like to eat. Try to do this before you eat.  · Decide how much of the food you plan to eat.  · Write down what you ate and how many calories it had. Doing this is called keeping a food log.     To successfully lose weight, it is important to balance calorie counting with a healthy lifestyle that includes regular activity. Aim for 150 minutes of moderate exercise (such as walking) or 75 minutes of vigorous exercise (such as running) each week.  Where do I find calorie information?       The number of calories in a food can be found on a Nutrition Facts label. If a food does not have a Nutrition Facts label, try to look up the calories online or ask your dietitian for help.  Remember that calories are listed per serving. If you choose to have more than one serving of a food, you will have to multiply the calories per serving by the amount of servings you plan to eat. For example, the label on a package of bread might say that a serving size is 1 slice and that there are 90 calories in a serving. If you eat 1 slice, you will have eaten 90 calories. If you eat 2 slices, you will have eaten 180 calories.  How do I keep a food log?  Immediately after each meal, record the following information in your food log:  · What you ate. Don't forget to include toppings, sauces, and other extras on the food.  · How much you ate. This can be measured in cups, ounces, or number of items.  · How many calories each food and drink had.  · The total number of calories in the meal.     Keep your food log near you, such as in a small notebook in your pocket, or use a mobile soha or website. Some programs will calculate calories for you and show you how many calories you have left for the day to meet your goal.  What are some calorie counting  "tips?  · Use your calories on foods and drinks that will fill you up and not leave you hungry:  ? Some examples of foods that fill you up are nuts and nut butters, vegetables, lean proteins, and high-fiber foods like whole grains. High-fiber foods are foods with more than 5 g fiber per serving.  ? Drinks such as sodas, specialty coffee drinks, alcohol, and juices have a lot of calories, yet do not fill you up.  · Eat nutritious foods and avoid empty calories. Empty calories are calories you get from foods or beverages that do not have many vitamins or protein, such as candy, sweets, and soda. It is better to have a nutritious high-calorie food (such as an avocado) than a food with few nutrients (such as a bag of chips).  · Know how many calories are in the foods you eat most often. This will help you calculate calorie counts faster.  · Pay attention to calories in drinks. Low-calorie drinks include water and unsweetened drinks.  · Pay attention to nutrition labels for \"low fat\" or \"fat free\" foods. These foods sometimes have the same amount of calories or more calories than the full fat versions. They also often have added sugar, starch, or salt, to make up for flavor that was removed with the fat.  ·   · Find a way of tracking calories that works for you. Get creative. Try different apps or programs if writing down calories does not work for you.  What are some portion control tips?  · Know how many calories are in a serving. This will help you know how many servings of a certain food you can have.  · Use a measuring cup to measure serving sizes. You could also try weighing out portions on a kitchen scale. With time, you will be able to estimate serving sizes for some foods.  · Take some time to put servings of different foods on your favorite plates, bowls, and cups so you know what a serving looks like.  · Try not to eat straight from a bag or box. Doing this can lead to overeating. Put the amount you would like " to eat in a cup or on a plate to make sure you are eating the right portion.  · Use smaller plates, glasses, and bowls to prevent overeating.  · Try not to multitask (for example, watch TV or use your computer) while eating. If it is time to eat, sit down at a table and enjoy your food. This will help you to know when you are full. It will also help you to be aware of what you are eating and how much you are eating.  What are tips for following this plan?  Reading food labels  · Check the calorie count compared to the serving size. The serving size may be smaller than what you are used to eating.  · Check the source of the calories. Make sure the food you are eating is high in vitamins and protein and low in saturated and trans fats.  Shopping  · Read nutrition labels while you shop. This will help you make healthy decisions before you decide to purchase your food.  · Make a grocery list and stick to it.  Cooking  · Try to cook your favorite foods in a healthier way. For example, try baking instead of frying.  · Use low-fat dairy products.  Meal planning  · Use more fruits and vegetables. Half of your plate should be fruits and vegetables.  · Include lean proteins like poultry and fish.  How do I count calories when eating out?  · Ask for smaller portion sizes.  · Consider sharing an entree and sides instead of getting your own entree.  · If you get your own entree, eat only half. Ask for a box at the beginning of your meal and put the rest of your entree in it so you are not tempted to eat it.  · If calories are listed on the menu, choose the lower calorie options.  · Choose dishes that include vegetables, fruits, whole grains, low-fat dairy products, and lean protein.  · Choose items that are boiled, broiled, grilled, or steamed. Stay away from items that are buttered, battered, fried, or served with cream sauce. Items labeled “crispy” are usually fried, unless stated otherwise.  · Choose water, low-fat milk,  unsweetened iced tea, or other drinks without added sugar. If you want an alcoholic beverage, choose a lower calorie option such as a glass of wine or light beer.  · Ask for dressings, sauces, and syrups on the side. These are usually high in calories, so you should limit the amount you eat.  · If you want a salad, choose a garden salad and ask for grilled meats. Avoid extra toppings like [place], cheese, or fried items. Ask for the dressing on the side, or ask for olive oil and vinegar or lemon to use as dressing.  · Estimate how many servings of a food you are given. For example, a serving of cooked rice is ½ cup or about the size of half a baseball. Knowing serving sizes will help you be aware of how much food you are eating at restaurants. The list below tells you how big or small some common portion sizes are based on everyday objects:  ? 1 oz--4 stacked dice.  ? 3 oz--1 deck of cards.  ? 1 tsp--1 die.  ? 1 Tbsp--½ a ping-pong ball.  ? 2 Tbsp--1 ping-pong ball.  ? ½ cup--½ baseball.  ? 1 cup--1 baseball.  Summary  · Calorie counting means keeping track of how many calories you eat and drink each day. If you eat fewer calories than your body needs, you should lose weight.  · A healthy amount of weight to lose per week is usually 1-2 lb (0.5-0.9 kg). This usually means reducing your daily calorie intake by 500-750 calories.  · The number of calories in a food can be found on a Nutrition Facts label. If a food does not have a Nutrition Facts label, try to look up the calories online or ask your dietitian for help.  · Use your calories on foods and drinks that will fill you up, and not on foods and drinks that will leave you hungry.  · Use smaller plates, glasses, and bowls to prevent overeating.      This information is not intended to replace advice given to you by your health care provider. Make sure you discuss any questions you have with your health care provider.  Document Released: 12/18/2006 Document Revised:  11/17/2017 Document Reviewed: 11/17/2017  [employer]vier Interactive Patient Education © 2018 [employer]

## 2020-01-09 ENCOUNTER — HOSPITAL ENCOUNTER (OUTPATIENT)
Dept: MAMMOGRAPHY | Facility: HOSPITAL | Age: 55
Discharge: HOME | End: 2020-01-09
Admitting: FAMILY MEDICINE

## 2020-01-09 ENCOUNTER — APPOINTMENT (OUTPATIENT)
Dept: LAB | Facility: HOSPITAL | Age: 55
End: 2020-01-09

## 2020-01-09 LAB
ALBUMIN SERPL-MCNC: 3.9 G/DL (ref 3.5–5.2)
ALBUMIN/GLOB SERPL: 1.2 G/DL
ALP SERPL-CCNC: 89 U/L (ref 39–117)
ALT SERPL W P-5'-P-CCNC: 35 U/L (ref 1–33)
ANION GAP SERPL CALCULATED.3IONS-SCNC: 12.5 MMOL/L (ref 5–15)
AST SERPL-CCNC: 24 U/L (ref 1–32)
BILIRUB SERPL-MCNC: 0.5 MG/DL (ref 0.2–1.2)
BUN BLD-MCNC: 13 MG/DL (ref 6–20)
BUN/CREAT SERPL: 15.3 (ref 7–25)
CALCIUM SPEC-SCNC: 9.4 MG/DL (ref 8.6–10.5)
CHLORIDE SERPL-SCNC: 98 MMOL/L (ref 98–107)
CHOLEST SERPL-MCNC: 214 MG/DL (ref 0–200)
CO2 SERPL-SCNC: 27.5 MMOL/L (ref 22–29)
CREAT BLD-MCNC: 0.85 MG/DL (ref 0.57–1)
DEPRECATED RDW RBC AUTO: 43 FL (ref 37–54)
ERYTHROCYTE [DISTWIDTH] IN BLOOD BY AUTOMATED COUNT: 12 % (ref 12.3–15.4)
GFR SERPL CREATININE-BSD FRML MDRD: 70 ML/MIN/1.73
GLOBULIN UR ELPH-MCNC: 3.3 GM/DL
GLUCOSE BLD-MCNC: 116 MG/DL (ref 65–99)
HBA1C MFR BLD: 5.8 % (ref 4.8–5.6)
HCT VFR BLD AUTO: 46 % (ref 34–46.6)
HDLC SERPL-MCNC: 44 MG/DL (ref 40–60)
HGB BLD-MCNC: 15.8 G/DL (ref 12–15.9)
LDLC SERPL CALC-MCNC: 136 MG/DL (ref 0–100)
LDLC/HDLC SERPL: 3.09 {RATIO}
MCH RBC QN AUTO: 33.7 PG (ref 26.6–33)
MCHC RBC AUTO-ENTMCNC: 34.3 G/DL (ref 31.5–35.7)
MCV RBC AUTO: 98.1 FL (ref 79–97)
PLATELET # BLD AUTO: 203 10*3/MM3 (ref 140–450)
PMV BLD AUTO: 11 FL (ref 6–12)
POTASSIUM BLD-SCNC: 4.6 MMOL/L (ref 3.5–5.2)
PROT SERPL-MCNC: 7.2 G/DL (ref 6–8.5)
RBC # BLD AUTO: 4.69 10*6/MM3 (ref 3.77–5.28)
SODIUM BLD-SCNC: 138 MMOL/L (ref 136–145)
T4 FREE SERPL-MCNC: 1.41 NG/DL (ref 0.93–1.7)
TRIGL SERPL-MCNC: 171 MG/DL (ref 0–150)
TSH SERPL DL<=0.05 MIU/L-ACNC: 1.09 UIU/ML (ref 0.27–4.2)
VIT B12 BLD-MCNC: 449 PG/ML (ref 211–946)
VLDLC SERPL-MCNC: 34.2 MG/DL (ref 5–40)
WBC NRBC COR # BLD: 8.71 10*3/MM3 (ref 3.4–10.8)

## 2020-01-09 PROCEDURE — 84439 ASSAY OF FREE THYROXINE: CPT | Performed by: FAMILY MEDICINE

## 2020-01-09 PROCEDURE — 84443 ASSAY THYROID STIM HORMONE: CPT | Performed by: FAMILY MEDICINE

## 2020-01-09 PROCEDURE — 77063 BREAST TOMOSYNTHESIS BI: CPT

## 2020-01-09 PROCEDURE — 80053 COMPREHEN METABOLIC PANEL: CPT | Performed by: FAMILY MEDICINE

## 2020-01-09 PROCEDURE — 83525 ASSAY OF INSULIN: CPT | Performed by: FAMILY MEDICINE

## 2020-01-09 PROCEDURE — 83036 HEMOGLOBIN GLYCOSYLATED A1C: CPT | Performed by: FAMILY MEDICINE

## 2020-01-09 PROCEDURE — 36415 COLL VENOUS BLD VENIPUNCTURE: CPT | Performed by: FAMILY MEDICINE

## 2020-01-09 PROCEDURE — 85027 COMPLETE CBC AUTOMATED: CPT | Performed by: FAMILY MEDICINE

## 2020-01-09 PROCEDURE — 82607 VITAMIN B-12: CPT | Performed by: FAMILY MEDICINE

## 2020-01-09 PROCEDURE — 77067 SCR MAMMO BI INCL CAD: CPT

## 2020-01-09 PROCEDURE — 80061 LIPID PANEL: CPT | Performed by: FAMILY MEDICINE

## 2020-01-13 LAB — INSULIN SERPL-ACNC: 31 UIU/ML

## 2020-01-14 ENCOUNTER — PATIENT MESSAGE (OUTPATIENT)
Dept: INTERNAL MEDICINE | Facility: CLINIC | Age: 55
End: 2020-01-14

## 2020-01-14 DIAGNOSIS — R73.9 HYPERGLYCEMIA: Primary | ICD-10-CM

## 2020-01-14 DIAGNOSIS — E66.01 CLASS 3 SEVERE OBESITY DUE TO EXCESS CALORIES WITHOUT SERIOUS COMORBIDITY WITH BODY MASS INDEX (BMI) OF 50.0 TO 59.9 IN ADULT (HCC): ICD-10-CM

## 2020-01-16 RX ORDER — BUPROPION HYDROCHLORIDE 150 MG/1
150 TABLET ORAL EVERY MORNING
Qty: 90 TABLET | Refills: 3 | Status: SHIPPED | OUTPATIENT
Start: 2020-01-16 | End: 2021-09-30

## 2020-01-16 NOTE — TELEPHONE ENCOUNTER
From: Marianela Espinal MD  To: Lea Thompson  Sent: 1/14/2020 1:02 PM EST  Subject: labs    Pre-diabetic with mildly elevated A1C and insulin resistant with elevated level. You've tried metformin prior and did not tolerate, I believe. Weight loss could help. If you want to discuss medicine options let me know. Vitamin B12 is low normal range, not bad. Thyroid level is normal.    If you have any questions please let me know.    Dr. Espinal

## 2020-04-19 NOTE — PLAN OF CARE
Pharmacy TPN Dosing Note



S: JESENIA RICH is a 51 year old F Currently receiving Central Continuous TPN started 
03/18/20



B:Pertinent PMH: Necrotizing pancreatitis

Height: 5 feet, 8 inches

Weight: 105.2 kg



Current diet: NPO 



LABS:

Sodium:    140 

Potassium: 3.8 

Chloride:  103 

Calcium:   8.4 

Corrected Calcium: 9.36 

Magnesium: 1.8 

CO2:       27 

SCr:       1.4 

Glucose:   126-147 

Albumin:   2.8 

AST:       23 

ALT:       11 



TPN FORMULA:

TPN TYPE:  Central Continuous

AMINO ACIDS:         125 gm

DEXTROSE:            225 gm

LIPIDS:              20 gm



SODIUM CHLORIDE:     100 mEq

POTASSIUM CHLORIDE:  40 mEq

MAGNESIUM:           15 mEq

CALCIUM:             15 mEq

INSULIN:             35 units

MULTIPLE VITAMIN:    10 ml

TRACE ELEMENTS:      0.5 ml(s)



TPN PLAN:  Continue same.





R: Continue TPN 

Will monitor electrolytes, glucose, and tolerance to TPN.



 Maribell Vazquez RPH, 04/19/20 0906 Problem: Perioperative Period (Adult)  Goal: Signs and Symptoms of Listed Potential Problems Will be Absent or Manageable (Perioperative Period)  Outcome: Ongoing (interventions implemented as appropriate)    02/16/17 0721   Perioperative Period   Problems Assessed (Perioperative Period) all   Problems Present (Perioperative Period) none

## 2020-11-09 ENCOUNTER — OFFICE VISIT (OUTPATIENT)
Dept: INTERNAL MEDICINE | Facility: CLINIC | Age: 55
End: 2020-11-09

## 2020-11-09 ENCOUNTER — TELEPHONE (OUTPATIENT)
Dept: SURGERY | Facility: CLINIC | Age: 55
End: 2020-11-09

## 2020-11-09 VITALS
DIASTOLIC BLOOD PRESSURE: 75 MMHG | TEMPERATURE: 98.2 F | HEART RATE: 92 BPM | OXYGEN SATURATION: 97 % | WEIGHT: 293 LBS | BODY MASS INDEX: 50.02 KG/M2 | SYSTOLIC BLOOD PRESSURE: 135 MMHG | HEIGHT: 64 IN | RESPIRATION RATE: 18 BRPM

## 2020-11-09 DIAGNOSIS — E53.8 VITAMIN B12 DEFICIENCY: ICD-10-CM

## 2020-11-09 DIAGNOSIS — R73.9 HYPERGLYCEMIA: ICD-10-CM

## 2020-11-09 DIAGNOSIS — E78.2 MIXED HYPERLIPIDEMIA: ICD-10-CM

## 2020-11-09 DIAGNOSIS — Z12.31 ENCOUNTER FOR SCREENING MAMMOGRAM FOR BREAST CANCER: ICD-10-CM

## 2020-11-09 DIAGNOSIS — Z99.89 OSA ON CPAP: ICD-10-CM

## 2020-11-09 DIAGNOSIS — E66.01 CLASS 3 SEVERE OBESITY DUE TO EXCESS CALORIES WITHOUT SERIOUS COMORBIDITY WITH BODY MASS INDEX (BMI) OF 50.0 TO 59.9 IN ADULT (HCC): ICD-10-CM

## 2020-11-09 DIAGNOSIS — G47.33 OSA ON CPAP: ICD-10-CM

## 2020-11-09 DIAGNOSIS — Z12.11 COLON CANCER SCREENING: ICD-10-CM

## 2020-11-09 DIAGNOSIS — Z00.00 ANNUAL PHYSICAL EXAM: Primary | ICD-10-CM

## 2020-11-09 DIAGNOSIS — E55.9 VITAMIN D DEFICIENCY: ICD-10-CM

## 2020-11-09 DIAGNOSIS — Z11.59 NEED FOR HEPATITIS C SCREENING TEST: ICD-10-CM

## 2020-11-09 DIAGNOSIS — E88.81 INSULIN RESISTANCE: ICD-10-CM

## 2020-11-09 PROCEDURE — 99396 PREV VISIT EST AGE 40-64: CPT | Performed by: FAMILY MEDICINE

## 2020-11-09 NOTE — PATIENT INSTRUCTIONS
Mediterranean Diet  A Mediterranean diet refers to food and lifestyle choices that are based on the traditions of countries located on the [place]. This way of eating has been shown to help prevent certain conditions and improve outcomes for people who have chronic diseases, like kidney disease and heart disease.  What are tips for following this plan?  Lifestyle  · Cook and eat meals together with your family, when possible.  · Drink enough fluid to keep your urine clear or pale yellow.  · Be physically active every day. This includes:  ? Aerobic exercise like running or swimming.  ? Leisure activities like gardening, walking, or housework.  · Get 7-8 hours of sleep each night.  · If recommended by your health care provider, drink red wine in moderation. This means 1 glass a day for nonpregnant women and 2 glasses a day for men. A glass of wine equals 5 oz (150 mL).  Reading food labels    · Check the serving size of packaged foods. For foods such as rice and pasta, the serving size refers to the amount of cooked product, not dry.  · Check the total fat in packaged foods. Avoid foods that have saturated fat or trans fats.  · Check the ingredients list for added sugars, such as corn syrup.  Shopping  · At the grocery store, buy most of your food from the areas near the walls of the store. This includes:  ? Fresh fruits and vegetables (produce).  ? Grains, beans, nuts, and seeds. Some of these may be available in unpackaged forms or large amounts (in bulk).  ? Fresh seafood.  ? Poultry and eggs.  ? Low-fat dairy products.  · Buy whole ingredients instead of prepackaged foods.  · Buy fresh fruits and vegetables in-season from local farmers markets.  · Buy frozen fruits and vegetables in resealable bags.  · If you do not have access to quality fresh seafood, buy precooked frozen shrimp or canned fish, such as tuna, salmon, or sardines.  · Buy small amounts of raw or cooked vegetables, salads, or olives from  the deli or salad bar at your store.  · Stock your pantry so you always have certain foods on hand, such as olive oil, canned tuna, canned tomatoes, rice, pasta, and beans.  Cooking  · Cook foods with extra-virgin olive oil instead of using butter or other vegetable oils.  · Have meat as a side dish, and have vegetables or grains as your main dish. This means having meat in small portions or adding small amounts of meat to foods like pasta or stew.  · Use beans or vegetables instead of meat in common dishes like chili or lasagna.  · [place] with different cooking methods. Try roasting or broiling vegetables instead of steaming or sautéeing them.  · Add frozen vegetables to soups, stews, pasta, or rice.  · Add nuts or seeds for added healthy fat at each meal. You can add these to yogurt, salads, or vegetable dishes.  · Marinate fish or vegetables using olive oil, lemon juice, garlic, and fresh herbs.  Meal planning    · Plan to eat 1 vegetarian meal one day each week. Try to work up to 2 vegetarian meals, if possible.  · Eat seafood 2 or more times a week.  · Have healthy snacks readily available, such as:  ? Vegetable sticks with hummus.  ? Greek yogurt.  ? Fruit and nut trail mix.  · Eat balanced meals throughout the week. This includes:  ? Fruit: 2-3 servings a day  ? Vegetables: 4-5 servings a day  ? Low-fat dairy: 2 servings a day  ? Fish, poultry, or lean meat: 1 serving a day  ? Beans and legumes: 2 or more servings a week  ? Nuts and seeds: 1-2 servings a day  ? Whole grains: 6-8 servings a day  ? Extra-virgin olive oil: 3-4 servings a day  · Limit red meat and sweets to only a few servings a month  What are my food choices?  · Mediterranean diet  ? Recommended  § Grains: Whole-grain pasta. Brown rice. Bulgar wheat. Polenta. Couscous. Whole-wheat bread. Oatmeal. Quinoa.  § Vegetables: Artichokes. Beets. Broccoli. Cabbage. Carrots. Eggplant. Green beans. Chard. Kale. Spinach. Onions. Leeks. Peas. Squash.  Tomatoes. Peppers. Radishes.  § Fruits: Apples. Apricots. Avocado. Berries. Bananas. Cherries. Dates. Figs. Grapes. [place]ay[place]. Melon. Oranges. Peaches. Plums. Pomegranate.  § Meats and other protein foods: Beans. Almonds. Sunflower seeds. Pine nuts. Peanuts. Cod. [place]. Scallops. Shrimp. Tuna. Tilapia. Clams. Oysters. Eggs.  § Dairy: Low-fat milk. Cheese. Greek yogurt.  § Beverages: Water. Red wine. Herbal tea.  § Fats and oils: Extra virgin olive oil. Avocado oil. Grape seed oil.  § Sweets and desserts: Greek yogurt with honey. Baked apples. Poached pears. Trail mix.  § Seasoning and other foods: Basil. Cilantro. Coriander. Cumin. Mint. Parsley. [place]. Rosemary. Tarragon. Garlic. Oregano. Thyme. Pepper. Balsalmic vinegar. Tahini. Hummus. Tomato sauce. Olives. Mushrooms.  ? Limit these  § Grains: Prepackaged pasta or rice dishes. Prepackaged cereal with added sugar.  § Vegetables: Deep fried potatoes (french fries).  § Fruits: Fruit canned in syrup.  § Meats and other protein foods: Beef. Pork. Lamb. Poultry with skin. Hot dogs. [place].  § Dairy: Ice cream. Sour cream. Whole milk.  § Beverages: Juice. Sugar-sweetened soft drinks. Beer. Liquor and spirits.  § Fats and oils: Butter. Canola oil. Vegetable oil. Beef fat (tallow). Lard.  § Sweets and desserts: Cookies. Cakes. Pies. Candy.  § Seasoning and other foods: Mayonnaise. Premade sauces and marinades.  The items listed may not be a complete list. Talk with your dietitian about what dietary choices are right for you.  Summary  · The Mediterranean diet includes both food and lifestyle choices.  · Eat a variety of fresh fruits and vegetables, beans, nuts, seeds, and whole grains.  · Limit the amount of red meat and sweets that you eat.  · Talk with your health care provider about whether it is safe for you to drink red wine in moderation. This means 1 glass a day for nonpregnant women and 2 glasses a day for men. A glass of wine equals 5 oz (150 mL).  This information  is not intended to replace advice given to you by your health care provider. Make sure you discuss any questions you have with your health care provider.  Document Released: 08/10/2017 Document Revised: 08/17/2017 Document Reviewed: 08/10/2017  [employer] Patient Education © 2020 [employer]    Cervical Cancer Screening--the PAP test     What is cervical cancer screening?  Cervical cancer screening is used to find changes in the cells of the cervix that could lead to cancer. The cervix is the opening to the uterus and is located at the top of the vagina. Screening includes cervical cytology (also called the Pap test or Pap smear) and, for some women, testing for human papillomavirus (HPV).    How does cervical cancer occur?  Cancer occurs when cervical cells become abnormal and, over time, grow out of control. The cancer cells invade deeper into the cervical tissue. In advanced cases, cancer cells can spread to other organs of the body.    What causes cervical cancer?  Most cases of cervical cancer are caused by infection with HPV. HPV is a virus that enters cells and can cause them to change. Some types of HPV have been linked to cervical cancer as well as cancer of the vulva, vagina, penis, anus, mouth, and throat. Types of HPV that may cause cancer are known as “high-risk types.”  HPV is passed from person to person during sexual activity. It is very common, and most people who are sexually active will get an HPV infection in their lifetime. HPV infection often causes no symptoms. Most HPV infections go away on their own. These short-term infections typically cause only mild (“low-grade”) changes in cervical cells. The cells go back to normal as the HPV infection clears. But in some women, HPV does not go away. If a high-risk type of HPV infection lasts for a long time, it can cause more severe (“high-grade”) changes in cervical cells. High-grade changes are more likely to lead to cancer.    Why is cervical  cancer screening important?  It usually takes 3-7 years for high-grade changes in cervical cells to become cancer. Cervical cancer screening may detect these changes before they become cancer. Women with low-grade changes can be tested more frequently to see if their cells go back to normal. Women with high-grade changes can get treatment to have the cells removed.    How is cervical cancer screening done?  Cervical cancer screening includes the Pap test and, for some women, an HPV test. Both tests use cells taken from the cervix. The screening process is simple and fast. You lie on an exam table and a speculum is used to open the vagina. The speculum gives a clear view of the cervix and upper vagina. Cells are removed from the cervix with a brush or other sampling instrument. The cells usually are put into a special liquid and sent to a laboratory for testing:  ·  For a Pap test, the sample is examined to see if abnormal cells are present.  ·  For an HPV test, the sample is tested for the presence of 13-14 of the most common high-risk HPV types.    How often should I have cervical cancer screening and which tests should I have?  How often you should have cervical cancer screening and which tests you should have depend on your age and health history:  ·  Women aged 21-29 years should have a Pap test alone every 3 years. HPV testing is not recommended.  · Women aged 30-65 years should have a Pap test and an HPV test (co-testing) every 5 years (preferred). It also is acceptable to have a Pap test alone every 3 years.    When should I stop having cervical cancer screening? You should stop having cervical cancer screening after age 65 years if:  · you do not have a history of moderate or severe abnormal cervical cells or cervical cancer, and  · you have had either three negative Pap test results in a row or two negative co-test results in a row within the past 10 years, with the most recent test performed within the  past 5 years.    If I have had a hysterectomy, do I still need cervical cancer screening?  If you have had a hysterectomy, you still may need screening. The decision is based on whether your cervix was removed, why the hysterectomy was needed, and whether you have a history of moderate or severe cervical cell changes or cervical cancer. Even if your cervix is removed at the time of hysterectomy, cervical cells can still be present at the top of the vagina. If you have a history of cervical cancer or cervical cell changes, you should continue to have screening for 20 years after the time of your surgery.    Are there any women who should not follow routine cervical cancer screening guidelines?  Yes. Women who have a history of cervical cancer, are infected with human immunodeficiency virus (HIV), have a weakened immune system, or who were exposed to diethylstilbestrol (ADDISON) before birth may require more frequent screening and should not follow these routine guidelines.    Having an HPV vaccination (such as Gardasil) does not change screening recommendations. Women who have been vaccinated against HPV still need to follow the screening recommendations for their age group.    What does it mean if I have an abnormal cervical cancer screening test result?  Many women have abnormal cervical cancer screening results. An abnormal result does not mean that you have cancer. Remember that cervical cell changes often go back to normal on their own. And if they do not, it often takes several years for even high-grade changes to become cancer.     If you have an abnormal screening test result, additional testing is needed to find out whether high-grade changes or cancer actually are present. Sometimes, only repeat testing is needed. In other cases, colposcopy and cervical biopsy may be recommended to find out how severe the changes really are. If results of follow-up tests indicate high-grade changes, you may need treatment to  remove the abnormal cells. You will need follow-up testing after treatment and will need to get regular cervical cancer screening after the follow-up is complete.    How accurate are cervical cancer screening test results?  As with any lab test, cervical cancer screening results are not always accurate. Sometimes, the results show abnormal cells when the cells are normal. This is called a “false-positive” result. Cervical cancer screening also may not detect abnormal cells when they are present. This is called a “false-negative” result. To help prevent false-negative or false-positive results, you should avoid douching, sexual intercourse, and using vaginal medications or hygiene products for 2 days before your test. You also should avoid cervical cancer screening when you have your menstrual period.    Designed as an aid to patients, this document sets forth current information and opinions related to women’s health. The information does not dictate an exclusive course of treatment or procedure to be followed and should not be construed as excluding other acceptable methods of practice. Variations, taking into account the needs of the individual patient, resources, and limitations unique to the institution or type of practice, may be appropriate.    Copyright September 2017 by the American College of Obstetricians and Gynecologists  Health Maintenance, Female  Adopting a healthy lifestyle and getting preventive care can go a long way to promote health and wellness. Talk with your health care provider about what schedule of regular examinations is right for you. This is a good chance for you to check in with your provider about disease prevention and staying healthy.  In between checkups, there are plenty of things you can do on your own. Experts have done a lot of research about which lifestyle changes and preventive measures are most likely to keep you healthy. Ask your health care provider for more  information.  Weight and diet  Eat a healthy diet  · Be sure to include plenty of vegetables, fruits, low-fat dairy products, and lean protein.  · Do not eat a lot of foods high in solid fats, added sugars, or salt.  · Get regular exercise. This is one of the most important things you can do for your health.  ? Most adults should exercise for at least 150 minutes each week. The exercise should increase your heart rate and make you sweat (moderate-intensity exercise).  ? Most adults should also do strengthening exercises at least twice a week. This is in addition to the moderate-intensity exercise.     Maintain a healthy weight  · Body mass index (BMI) is a measurement that can be used to identify possible weight problems. It estimates body fat based on height and weight. Your health care provider can help determine your BMI and help you achieve or maintain a healthy weight.  · For females 20 years of age and older:  ? A BMI below 18.5 is considered underweight.  ? A BMI of 18.5 to 24.9 is normal.  ? A BMI of 25 to 29.9 is considered overweight.  ? A BMI of 30 and above is considered obese.     Watch levels of cholesterol and blood lipids  · You should start having your blood tested for lipids and cholesterol at 20 years of age, then have this test every 5 years.  · You may need to have your cholesterol levels checked more often if:  ? Your lipid or cholesterol levels are high.  ? You are older than 50 years of age.  ? You are at high risk for heart disease.     Cancer screening  Lung Cancer  · Lung cancer screening is recommended for adults 55-80 years old who are at high risk for lung cancer because of a history of smoking.  · A yearly low-dose CT scan of the lungs is recommended for people who:  ? Currently smoke.  ? Have quit within the past 15 years.  ? Have at least a 30-pack-year history of smoking. A pack year is smoking an average of one pack of cigarettes a day for 1 year.  · Yearly screening should  continue until it has been 15 years since you quit.  · Yearly screening should stop if you develop a health problem that would prevent you from having lung cancer treatment.     Breast Cancer  · Practice breast self-awareness. This means understanding how your breasts normally appear and feel.  · It also means doing regular breast self-exams. Let your health care provider know about any changes, no matter how small.  · If you are in your 20s or 30s, you should have a clinical breast exam (CBE) by a health care provider every 1-3 years as part of a regular health exam.  · If you are 40 or older, have a CBE every year. Also consider having a breast X-ray (mammogram) every year.  · If you have a family history of breast cancer, talk to your health care provider about genetic screening.  · If you are at high risk for breast cancer, talk to your health care provider about having an MRI and a mammogram every year.  · Breast cancer gene (BRCA) assessment is recommended for women who have family members with BRCA-related cancers. BRCA-related cancers include:  ? Breast.  ? Ovarian.  ? Tubal.  ? Peritoneal cancers.  · Results of the assessment will determine the need for genetic counseling and BRCA1 and BRCA2 testing.     Cervical Cancer  Your health care provider may recommend that you be screened regularly for cancer of the pelvic organs (ovaries, uterus, and vagina). This screening involves a pelvic examination, including checking for microscopic changes to the surface of your cervix (Pap test). You may be encouraged to have this screening done every 3 years, beginning at age 21.  · For women ages 30-65, health care providers may recommend pelvic exams and Pap testing every 3 years, or they may recommend the Pap and pelvic exam, combined with testing for human papilloma virus (HPV), every 5 years. Some types of HPV increase your risk of cervical cancer. Testing for HPV may also be done on women of any age with unclear Pap  test results.  · Other health care providers may not recommend any screening for nonpregnant women who are considered low risk for pelvic cancer and who do not have symptoms. Ask your health care provider if a screening pelvic exam is right for you.  · If you have had past treatment for cervical cancer or a condition that could lead to cancer, you need Pap tests and screening for cancer for at least 20 years after your treatment. If Pap tests have been discontinued, your risk factors (such as having a new sexual partner) need to be reassessed to determine if screening should resume. Some women have medical problems that increase the chance of getting cervical cancer. In these cases, your health care provider may recommend more frequent screening and Pap tests.     Colorectal Cancer  · This type of cancer can be detected and often prevented.  · Routine colorectal cancer screening usually begins at 50 years of age and continues through 75 years of age.  · Your health care provider may recommend screening at an earlier age if you have risk factors for colon cancer.  · Your health care provider may also recommend using home test kits to check for hidden blood in the stool.  · A small camera at the end of a tube can be used to examine your colon directly (sigmoidoscopy or colonoscopy). This is done to check for the earliest forms of colorectal cancer.  · Routine screening usually begins at age 50.  · Direct examination of the colon should be repeated every 5-10 years through 75 years of age. However, you may need to be screened more often if early forms of precancerous polyps or small growths are found.     Skin Cancer  · Check your skin from head to toe regularly.  · Tell your health care provider about any new moles or changes in moles, especially if there is a change in a mole's shape or color.  · Also tell your health care provider if you have a mole that is larger than the size of a pencil eraser.  · Always use  sunscreen. Apply sunscreen liberally and repeatedly throughout the day.  · Protect yourself by wearing long sleeves, pants, a wide-brimmed hat, and sunglasses whenever you are outside.     Heart disease, diabetes, and high blood pressure  · High blood pressure causes heart disease and increases the risk of stroke. High blood pressure is more likely to develop in:  ? People who have blood pressure in the high end of the normal range (130-139/85-89 mm Hg).  ? People who are overweight or obese.  ? People who are .  · If you are 18-39 years of age, have your blood pressure checked every 3-5 years. If you are 40 years of age or older, have your blood pressure checked every year. You should have your blood pressure measured twice--once when you are at a hospital or clinic, and once when you are not at a hospital or clinic. Record the average of the two measurements. To check your blood pressure when you are not at a hospital or clinic, you can use:  ? An automated blood pressure machine at a pharmacy.  ? A home blood pressure monitor.  · If you are between 55 years and 79 years old, ask your health care provider if you should take aspirin to prevent strokes.  · Have regular diabetes screenings. This involves taking a blood sample to check your fasting blood sugar level.  ? If you are at a normal weight and have a low risk for diabetes, have this test once every three years after 45 years of age.  ? If you are overweight and have a high risk for diabetes, consider being tested at a younger age or more often.  Preventing infection  Hepatitis B  · If you have a higher risk for hepatitis B, you should be screened for this virus. You are considered at high risk for hepatitis B if:  ? You were born in a country where hepatitis B is common. Ask your health care provider which countries are considered high risk.  ? Your parents were born in a high-risk country, and you have not been immunized against hepatitis B  (hepatitis B vaccine).  ? You have HIV or AIDS.  ? You use needles to inject street drugs.  ? You live with someone who has hepatitis B.  ? You have had sex with someone who has hepatitis B.  ? You get hemodialysis treatment.  ? You take certain medicines for conditions, including cancer, organ transplantation, and autoimmune conditions.     Hepatitis C  · Blood testing is recommended for:  ? Everyone born from 1945 through 1965.  ? Anyone with known risk factors for hepatitis C.     Sexually transmitted infections (STIs)  · You should be screened for sexually transmitted infections (STIs) including gonorrhea and chlamydia if:  ? You are sexually active and are younger than 24 years of age.  ? You are older than 24 years of age and your health care provider tells you that you are at risk for this type of infection.  ? Your sexual activity has changed since you were last screened and you are at an increased risk for chlamydia or gonorrhea. Ask your health care provider if you are at risk.  · If you do not have HIV, but are at risk, it may be recommended that you take a prescription medicine daily to prevent HIV infection. This is called pre-exposure prophylaxis (PrEP). You are considered at risk if:  ? You are sexually active and do not regularly use condoms or know the HIV status of your partner(s).  ? You take drugs by injection.  ? You are sexually active with a partner who has HIV.     Talk with your health care provider about whether you are at high risk of being infected with HIV. If you choose to begin PrEP, you should first be tested for HIV. You should then be tested every 3 months for as long as you are taking PrEP.  Pregnancy  · If you are premenopausal and you may become pregnant, ask your health care provider about preconception counseling.  · If you may become pregnant, take 400 to 800 micrograms (mcg) of folic acid every day.  · If you want to prevent pregnancy, talk to your health care provider about  birth control (contraception).  Osteoporosis and menopause  · Osteoporosis is a disease in which the bones lose minerals and strength with aging. This can result in serious bone fractures. Your risk for osteoporosis can be identified using a bone density scan.  · If you are 65 years of age or older, or if you are at risk for osteoporosis and fractures, ask your health care provider if you should be screened.  · Ask your health care provider whether you should take a calcium or vitamin D supplement to lower your risk for osteoporosis.  · Menopause may have certain physical symptoms and risks.  · Hormone replacement therapy may reduce some of these symptoms and risks.  Talk to your health care provider about whether hormone replacement therapy is right for you.  Follow these instructions at home:  · Schedule regular health, dental, and eye exams.  · Stay current with your immunizations.  · Do not use any tobacco products including cigarettes, chewing tobacco, or electronic cigarettes.  · If you are pregnant, do not drink alcohol.  · If you are breastfeeding, limit how much and how often you drink alcohol.  · Limit alcohol intake to no more than 1 drink per day for nonpregnant women. One drink equals 12 ounces of beer, 5 ounces of wine, or 1½ ounces of hard liquor.  · Do not use street drugs.  · Do not share needles.  · Ask your health care provider for help if you need support or information about quitting drugs.  · Tell your health care provider if you often feel depressed.  · Tell your health care provider if you have ever been abused or do not feel safe at home.  This information is not intended to replace advice given to you by your health care provider. Make sure you discuss any questions you have with your health care provider.  Document Released: 07/02/2012 Document Revised: 05/25/2017 Document Reviewed: 09/20/2016  Elsevier Interactive Patient Education © 2018 Elsevier Inc.

## 2020-11-09 NOTE — PROGRESS NOTES
"11/09/2020  Chief Complaint   Patient presents with   • Annual Exam     Physical       Lea Thompson is here for her annual preventive exam. History per MA reviewed.   More active since the summer. Riding bike. Trying to work on overall health.     Lea Thompson has the following medical issues:  Patient Active Problem List    Diagnosis   • HUGO (obstructive sleep apnea) [G47.33]   • History of pancreatitis [Z87.19]   • History of colon polyps [Z86.010]   • Tobacco abuse [Z72.0]   • Class 3 severe obesity without serious comorbidity with body mass index (BMI) of 50.0 to 59.9 in adult (CMS/[facility]) [E66.01, Z68.43]       Health Maintenance   Topic Date Due   • HEPATITIS C SCREENING  03/29/2017   • ZOSTER VACCINE (1 of 2) 11/18/2021 (Originally 5/8/2017)   • TDAP/TD VACCINES (1 - Tdap) 01/01/2024 (Originally 5/8/1986)   • LIPID PANEL  01/09/2021   • PAP SMEAR  11/06/2021   • ANNUAL PHYSICAL  11/10/2021   • MAMMOGRAM  01/09/2022   • COLONOSCOPY  04/04/2028   • Pneumococcal Vaccine 0-64  Completed   • INFLUENZA VACCINE  Completed       Immunization History   Administered Date(s) Administered   • Flu Vaccine Intradermal Quad 18-64YR 10/24/2020   • Flu Vaccine Quad PF >18YRS 10/17/2018, 10/16/2019   • Pneumococcal Polysaccharide (PPSV23) 10/17/2018       Review of Systems   Constitutional: Negative for fever.   Eyes: Negative for visual disturbance (using \"cheaters\").   Respiratory: Negative for shortness of breath.    Cardiovascular: Negative for chest pain.   Gastrointestinal: Negative for blood in stool.        No change in bowel habits   All other systems reviewed and are negative.      The following portions of the patient's history were reviewed and updated as appropriate: allergies, current medications, past family history, past medical history, past social history, past surgical history and problem list.    Objective   Visit Vitals  /75   Pulse 92   Temp 98.2 °F (36.8 °C) (Temporal)   Resp 18   Ht 162.6 cm " "(64\")   Wt (!) 142 kg (313 lb 8 oz)   LMP 05/27/2016   SpO2 97%   BMI 53.81 kg/m²        Physical Exam  Vitals signs and nursing note reviewed.   Constitutional:       General: She is not in acute distress.     Appearance: Normal appearance. She is well-developed and well-groomed. She is morbidly obese. She is not ill-appearing, toxic-appearing or diaphoretic.      Interventions: Face mask in place.   HENT:      Head: Normocephalic and atraumatic. Hair is normal.      Right Ear: Hearing, tympanic membrane, ear canal and external ear normal.      Left Ear: Hearing, tympanic membrane, ear canal and external ear normal.   Eyes:      General: Lids are normal. Gaze aligned appropriately. No scleral icterus.        Right eye: No discharge.         Left eye: No discharge.      Extraocular Movements: Extraocular movements intact.      Conjunctiva/sclera: Conjunctivae normal.      Pupils: Pupils are equal, round, and reactive to light.   Neck:      Musculoskeletal: Neck supple.      Thyroid: No thyromegaly.      Trachea: Trachea and phonation normal. No tracheal deviation.   Cardiovascular:      Rate and Rhythm: Normal rate and regular rhythm.      Heart sounds: Normal heart sounds. No murmur. No friction rub. No gallop.    Pulmonary:      Effort: Pulmonary effort is normal.      Breath sounds: Normal breath sounds and air entry.   Chest:      Chest wall: No tenderness.   Abdominal:      General: Bowel sounds are normal. There is no distension.      Palpations: Abdomen is soft. Abdomen is not rigid. There is no mass.      Tenderness: There is no abdominal tenderness. There is no guarding or rebound.   Musculoskeletal:         General: No tenderness or deformity.      Right lower leg: No edema.      Left lower leg: No edema.   Lymphadenopathy:      Head:      Right side of head: No submandibular adenopathy.      Left side of head: No submandibular adenopathy.   Skin:     General: Skin is warm.      Capillary Refill: Capillary " refill takes less than 2 seconds.      Coloration: Skin is not cyanotic, jaundiced or pale.      Findings: No rash.      Nails: There is no clubbing.     Neurological:      General: No focal deficit present.      Mental Status: She is alert and oriented to person, place, and time.      Cranial Nerves: No cranial nerve deficit.      Motor: No tremor, atrophy, abnormal muscle tone or seizure activity.      Gait: Gait is intact.   Psychiatric:         Attention and Perception: Attention and perception normal.         Mood and Affect: Mood and affect normal.         Speech: Speech normal.         Behavior: Behavior normal. Behavior is cooperative.         Thought Content: Thought content normal.         Cognition and Memory: Cognition and memory normal.         Judgment: Judgment normal.         Lab Results   Component Value Date    CHOL 214 (H) 01/09/2020    CHLPL 215 (H) 11/06/2018    TRIG 171 (H) 01/09/2020    HDL 44 01/09/2020     (H) 01/09/2020     Lab Results   Component Value Date    TSH 1.090 01/09/2020     Lab Results   Component Value Date    FREET4 1.41 01/09/2020     Lab Results   Component Value Date    HGBA1C 5.80 (H) 01/09/2020    HGBA1C 5.40 11/06/2018    HGBA1C 5.0 02/16/2017       Assessment     Problem List Items Addressed This Visit        Digestive    Class 3 severe obesity without serious comorbidity with body mass index (BMI) of 50.0 to 59.9 in adult (CMS/HCC)      Other Visit Diagnoses     Annual physical exam    -  Primary    Relevant Orders    Hemoglobin A1c    Lipid Panel    Vitamin B12    Folate    CBC (No Diff)    Comprehensive Metabolic Panel    Vitamin D 25 Hydroxy    Hepatitis C Antibody    Insulin, Random    Need for hepatitis C screening test        Relevant Orders    Hepatitis C Antibody    Hyperglycemia        Relevant Orders    Hemoglobin A1c    Vitamin B12 deficiency        Relevant Orders    Vitamin B12    Folate    Insulin resistance        Relevant Orders    Hemoglobin A1c     Insulin, Random    Mixed hyperlipidemia        Relevant Orders    Lipid Panel    Vitamin D deficiency        Relevant Orders    Vitamin D 25 Hydroxy    Colon cancer screening        Relevant Orders    Ambulatory Referral For Screening Colonoscopy    HUGO on CPAP        Relevant Orders    Ambulatory Referral to Sleep Medicine    Encounter for screening mammogram for breast cancer        Relevant Orders    Mammo Screening Digital Tomosynthesis Bilateral With CAD          · Health maintenance information provided with patient plan.   · Counseled on age appropriate health screenings.  · Immunizations for age discussed, encouraged.   · Encourage healthy habits such as exercise, healthy diet.  Patient's Body mass index is 53.81 kg/m². BMI is above normal parameters. Recommendations include: educational material, exercise counseling and nutrition counseling.  ·     Marianela Espinal MD

## 2020-11-18 NOTE — TELEPHONE ENCOUNTER
PCP note reviewed. Left message for patient to return my call. Patient has seen Dr. Reaves in the past.

## 2021-01-11 ENCOUNTER — HOSPITAL ENCOUNTER (OUTPATIENT)
Dept: MAMMOGRAPHY | Facility: HOSPITAL | Age: 56
Discharge: HOME | End: 2021-01-11
Admitting: FAMILY MEDICINE

## 2021-01-11 DIAGNOSIS — Z12.31 ENCOUNTER FOR SCREENING MAMMOGRAM FOR BREAST CANCER: ICD-10-CM

## 2021-01-11 PROCEDURE — 77067 SCR MAMMO BI INCL CAD: CPT

## 2021-01-11 PROCEDURE — 77063 BREAST TOMOSYNTHESIS BI: CPT

## 2021-02-17 DIAGNOSIS — Z01.818 PREOP TESTING: Primary | ICD-10-CM

## 2021-02-17 RX ORDER — BISACODYL 5 MG
TABLET, DELAYED RELEASE (ENTERIC COATED) ORAL
Qty: 4 TABLET | Refills: 0 | Status: SHIPPED | OUTPATIENT
Start: 2021-02-17 | End: 2021-03-23

## 2021-02-17 RX ORDER — POLYETHYLENE GLYCOL 3350 17 G/17G
POWDER, FOR SOLUTION ORAL
Qty: 238 G | Refills: 0 | Status: SHIPPED | OUTPATIENT
Start: 2021-02-17 | End: 2021-03-23

## 2021-02-18 ENCOUNTER — PREP FOR SURGERY (OUTPATIENT)
Dept: OTHER | Facility: HOSPITAL | Age: 56
End: 2021-02-18

## 2021-02-18 ENCOUNTER — TELEPHONE (OUTPATIENT)
Dept: SURGERY | Facility: CLINIC | Age: 56
End: 2021-02-18

## 2021-02-18 DIAGNOSIS — Z86.010 HISTORY OF COLON POLYPS: Primary | ICD-10-CM

## 2021-02-18 RX ORDER — SODIUM CHLORIDE 0.9 % (FLUSH) 0.9 %
10 SYRINGE (ML) INJECTION AS NEEDED
Status: CANCELLED | OUTPATIENT
Start: 2021-02-18

## 2021-02-18 RX ORDER — SODIUM CHLORIDE, SODIUM LACTATE, POTASSIUM CHLORIDE, CALCIUM CHLORIDE 600; 310; 30; 20 MG/100ML; MG/100ML; MG/100ML; MG/100ML
50 INJECTION, SOLUTION INTRAVENOUS CONTINUOUS
Status: CANCELLED | OUTPATIENT
Start: 2021-02-18

## 2021-02-18 RX ORDER — SODIUM CHLORIDE 0.9 % (FLUSH) 0.9 %
3 SYRINGE (ML) INJECTION EVERY 12 HOURS SCHEDULED
Status: CANCELLED | OUTPATIENT
Start: 2021-02-18

## 2021-03-23 ENCOUNTER — OFFICE VISIT (OUTPATIENT)
Dept: NEUROLOGY | Facility: CLINIC | Age: 56
End: 2021-03-23

## 2021-03-23 VITALS
OXYGEN SATURATION: 98 % | DIASTOLIC BLOOD PRESSURE: 80 MMHG | WEIGHT: 293 LBS | HEIGHT: 64 IN | BODY MASS INDEX: 50.02 KG/M2 | HEART RATE: 86 BPM | SYSTOLIC BLOOD PRESSURE: 122 MMHG | TEMPERATURE: 98 F

## 2021-03-23 DIAGNOSIS — G47.19 EXCESSIVE DAYTIME SLEEPINESS: ICD-10-CM

## 2021-03-23 DIAGNOSIS — R53.83 FATIGUE, UNSPECIFIED TYPE: Primary | ICD-10-CM

## 2021-03-23 DIAGNOSIS — G47.33 OSA (OBSTRUCTIVE SLEEP APNEA): ICD-10-CM

## 2021-03-23 DIAGNOSIS — R06.83 SNORING: ICD-10-CM

## 2021-03-23 PROCEDURE — 99203 OFFICE O/P NEW LOW 30 MIN: CPT | Performed by: NURSE PRACTITIONER

## 2021-03-23 NOTE — PATIENT INSTRUCTIONS
Sleep Apnea  Sleep apnea affects breathing during sleep. It causes breathing to stop for a short time or to become shallow. It can also increase the risk of:  · Heart attack.  · Stroke.  · Being very overweight (obese).  · Diabetes.  · Heart failure.  · Irregular heartbeat.  The goal of treatment is to help you breathe normally again.  What are the causes?  There are three kinds of sleep apnea:  · Obstructive sleep apnea. This is caused by a blocked or collapsed airway.  · Central sleep apnea. This happens when the brain does not send the right signals to the muscles that control breathing.  · Mixed sleep apnea. This is a combination of obstructive and central sleep apnea.  The most common cause of this condition is a collapsed or blocked airway. This can happen if:  · Your throat muscles are too relaxed.  · Your tongue and tonsils are too large.  · You are overweight.  · Your airway is too small.  What increases the risk?  · Being overweight.  · Smoking.  · Having a small airway.  · Being older.  · Being male.  · Drinking alcohol.  · Taking medicines to calm yourself (sedatives or tranquilizers).  · Having family members with the condition.  What are the signs or symptoms?  · Trouble staying asleep.  · Being sleepy or tired during the day.  · Getting angry a lot.  · Loud snoring.  · Headaches in the morning.  · Not being able to focus your mind (concentrate).  · Forgetting things.  · Less interest in sex.  · Mood swings.  · Personality changes.  · Feelings of sadness (depression).  · Waking up a lot during the night to pee (urinate).  · Dry mouth.  · Sore throat.  How is this diagnosed?  · Your medical history.  · A physical exam.  · A test that is done when you are sleeping (sleep study). The test is most often done in a sleep lab but may also be done at home.  How is this treated?    · Sleeping on your side.  · Using a medicine to get rid of mucus in your nose (decongestant).  · Avoiding the use of alcohol,  medicines to help you relax, or certain pain medicines (narcotics).  · Losing weight, if needed.  · Changing your diet.  · Not smoking.  · Using a machine to open your airway while you sleep, such as:  ? An oral appliance. This is a mouthpiece that shifts your lower jaw forward.  ? A CPAP device. This device blows air through a mask when you breathe out (exhale).  ? An EPAP device. This has valves that you put in each nostril.  ? A BPAP device. This device blows air through a mask when you breathe in (inhale) and breathe out.  · Having surgery if other treatments do not work.  It is important to get treatment for sleep apnea. Without treatment, it can lead to:  · High blood pressure.  · Coronary artery disease.  · In men, not being able to have an erection (impotence).  · Reduced thinking ability.  Follow these instructions at home:  Lifestyle  · Make changes that your doctor recommends.  · Eat a healthy diet.  · Lose weight if needed.  · Avoid alcohol, medicines to help you relax, and some pain medicines.  · Do not use any products that contain nicotine or tobacco, such as cigarettes, e-cigarettes, and chewing tobacco. If you need help quitting, ask your doctor.  General instructions  · Take over-the-counter and prescription medicines only as told by your doctor.  · If you were given a machine to use while you sleep, use it only as told by your doctor.  · If you are having surgery, make sure to tell your doctor you have sleep apnea. You may need to bring your device with you.  · Keep all follow-up visits as told by your doctor. This is important.  Contact a doctor if:  · The machine that you were given to use during sleep bothers you or does not seem to be working.  · You do not get better.  · You get worse.  Get help right away if:  · Your chest hurts.  · You have trouble breathing in enough air.  · You have an uncomfortable feeling in your back, arms, or stomach.  · You have trouble talking.  · One side of your  body feels weak.  · A part of your face is hanging down.  These symptoms may be an emergency. Do not wait to see if the symptoms will go away. Get medical help right away. Call your local emergency services (911 in the [place] Do not drive yourself to the hospital.  Summary  · This condition affects breathing during sleep.  · The most common cause is a collapsed or blocked airway.  · The goal of treatment is to help you breathe normally while you sleep.  This information is not intended to replace advice given to you by your health care provider. Make sure you discuss any questions you have with your health care provider.  Document Revised: 10/04/2019 Document Reviewed: 08/13/2019  [employer] Patient Education © 2021 Elsevier Inc.

## 2021-03-23 NOTE — PROGRESS NOTES
New Sleep Patient Office Visit      Patient Name: Lae Thompson  : 1965   MRN: [id number]     Referring Physician: Marianela Espinal MD    Chief Complaint:    Chief Complaint   Patient presents with   • Consult     NP, in office to establish care for moises.        History of Present Illness: Lea Thompson is a 55 y.o. female who is here today to establish care with Sleep Medicine.  She was diagnosed with MOISES about 20 years ago.  She has been using a friend's CPAP machine for about 6-7 years and doesn't know what the settings are.  Sleep questionnaire reviewed.  Without the PAP machine she snores, experiences restless sleep, wakes up gasping for breath, wakes up with a headache and dry mouth, experiences decreased concentration and daytimes sleepiness.  Additional risk factors- BMI 54, insulin resistance, mood disorder, history of MOISES, cigarette smoker.     Kenesaw Score: 10    Subjective      Review of Systems:   Review of Systems   Constitutional: Positive for fatigue. Negative for fever, unexpected weight gain and unexpected weight loss.   HENT: Negative for hearing loss, sore throat, swollen glands, tinnitus and trouble swallowing.    Eyes: Negative for blurred vision, double vision, photophobia and visual disturbance.   Respiratory: Negative for cough, chest tightness and shortness of breath.    Cardiovascular: Negative for chest pain, palpitations and leg swelling.   Gastrointestinal: Negative for constipation, diarrhea and nausea.   Endocrine: Negative for cold intolerance and heat intolerance.   Musculoskeletal: Negative for gait problem, neck pain and neck stiffness.   Skin: Negative for color change and rash.   Allergic/Immunologic: Negative for environmental allergies and food allergies.   Neurological: Negative for dizziness, syncope, facial asymmetry, speech difficulty, weakness, headache, memory problem and confusion.   Psychiatric/Behavioral: Negative for agitation, behavioral  "problems and depressed mood. The patient is not nervous/anxious.        Past Medical History:   Past Medical History:   Diagnosis Date   • Acid reflux    • Body piercing    • Choledocholithiasis 2/15/2017   • Cholelithiasis 2/15/2017   • Colon polyp    • Depression Dec 2017   • Dilated cbd, acquired 2017    Added automatically from request for surgery [id number]   • Headache Frequent   • History of nuclear stress test     \"IT WAS OK\".  AT [facility].   • Hyperlipidemia    • Obesity Currently   • Pancreatitis 2017    Related to ERCP   • PONV (postoperative nausea and vomiting)    • Sleep apnea     CPAP HS, INSTRUCTED TO BRING THIS IN THE DOS   • Wears glasses        Past Surgical History:   Past Surgical History:   Procedure Laterality Date   •  SECTION      x2   • CHOLECYSTECTOMY  2017   • CHOLECYSTECTOMY WITH INTRAOPERATIVE CHOLANGIOGRAM N/A 3/15/2017    Procedure: CHOLECYSTECTOMY LAPAROSCOPIC INTRAOPERATIVE CHOLANGIOGRAM;  Surgeon: Benjamin Reaves MD;  Location: [facility] OR;  Service:    • COLON SURGERY      MUCOSAL RESECTION   • COLONOSCOPY     • FLEXIBLE SIGMOIDOSCOPY     • POLYPECTOMY     • LA ERCP DX COLLECTION SPECIMEN BRUSHING/WASHING N/A 2017    Procedure: ENDOSCOPIC RETROGRADE CHOLANGIOPANCREATOGRAPHY WITH ENDOSCOPIC SPHINCTEROTOMY; LITHOTRIPSY; WIREGUIDED; BALLOON EXTRACTION (9MM/12MM); PLACEMENT OF 8.5 Sammar[place]-7 CM BILIARY STENT; EXTRACTION OF STONE FROM RIGHT INTRAHEPATIC DUCT;  Surgeon: Chaitanya Stanley MD;  Location: [facility] OR;  Service: Gastroenterology   • LA ERCP DX COLLECTION SPECIMEN BRUSHING/WASHING N/A 2017    Procedure: ENDOSCOPIC RETROGRADE CHOLANGIOPANCREATOGRAPHY with stent removal and balloon sweep;  Surgeon: Chaitanya Stanley MD;  Location: [facility] OR;  Service: Gastroenterology   • TUBAL ABDOMINAL LIGATION     • WISDOM TOOTH EXTRACTION         Family History:   Family History   Problem Relation Age of Onset   • Sarcoidosis Mother    • Colon polyps " "Mother    • Other Mother         Sarcadosis   • Cancer Father         Prostrate   • Heart disease Father    • Pancreatitis Father         He is due to ercp   • Sleep apnea Father    • Diabetes Paternal Grandfather    • Heart disease Paternal Grandfather        Social History:   Social History     Socioeconomic History   • Marital status:      Spouse name: Not on file   • Number of children: Not on file   • Years of education: Not on file   • Highest education level: Not on file   Tobacco Use   • Smoking status: Current Every Day Smoker     Packs/day: 1.00     Years: 15.00     Pack years: 15.00     Types: Cigarettes   • Smokeless tobacco: Never Used   • Tobacco comment: Have quit twice the first time for 8 yrs second time for two years resumed 2015   Substance and Sexual Activity   • Alcohol use: Yes     Types: 1 Glasses of wine per week     Comment: Occasionally   • Drug use: No   • Sexual activity: Yes     Partners: Male     Birth control/protection: Surgical     Comment:        Medications:     Current Outpatient Medications:   •  buPROPion XL (WELLBUTRIN XL) 150 MG 24 hr tablet, Take 1 tablet by mouth Every Morning., Disp: 90 tablet, Rfl: 3  •  metFORMIN (GLUCOPHAGE) 500 MG tablet, Take 1 tablet by mouth Daily With Breakfast., Disp: 90 tablet, Rfl: 3    Allergies:   No Known Allergies    Objective     Physical Exam:  Vital Signs:   Vitals:    03/23/21 1009   BP: 122/80   BP Location: Left arm   Patient Position: Sitting   Cuff Size: Adult   Pulse: 86   Temp: 98 °F (36.7 °C)   SpO2: 98%   Weight: (!) 145 kg (319 lb 6.4 oz)   Height: 162.6 cm (64\")   PainSc: 0-No pain     BMI: Body mass index is 54.82 kg/m².  Neck Circumference: 13 7/8    Physical Exam  Vitals and nursing note reviewed.   Constitutional:       General: She is not in acute distress.     Appearance: She is well-developed. She is obese. She is not diaphoretic.   HENT:      Head: Normocephalic and atraumatic.      Comments: Mallampati " 4  Eyes:      Conjunctiva/sclera: Conjunctivae normal.      Pupils: Pupils are equal, round, and reactive to light.   Neck:      Thyroid: No thyroid mass or thyromegaly.      Vascular: Normal carotid pulses.      Trachea: Trachea normal.   Cardiovascular:      Rate and Rhythm: Normal rate and regular rhythm.      Heart sounds: Normal heart sounds. No murmur heard.   No friction rub. No gallop.    Pulmonary:      Effort: Pulmonary effort is normal. No respiratory distress.      Breath sounds: Normal breath sounds. No wheezing or rales.   Musculoskeletal:         General: Normal range of motion.   Skin:     General: Skin is warm and dry.      Findings: No rash.   Neurological:      Mental Status: She is alert and oriented to person, place, and time.   Psychiatric:         Mood and Affect: Mood normal.         Behavior: Behavior normal.         Thought Content: Thought content normal.         Judgment: Judgment normal.         Assessment / Plan      Assessment/Plan:   Diagnoses and all orders for this visit:    1. Fatigue, unspecified type (Primary)  -     Polysomnography 4 or More Parameters With CPAP; Future  - Advised patient to avoid driving if drowsy.   - Encouraged weight loss with a BMI goal of 24.   - Printed patient education on HUGO provided today.     2. Snoring  -     Polysomnography 4 or More Parameters With CPAP; Future    3. BMI 50.0-59.9, adult (CMS/HCC)  -     Polysomnography 4 or More Parameters With CPAP; Future    4. Excessive daytime sleepiness  -     Polysomnography 4 or More Parameters With CPAP; Future    5. HUGO (obstructive sleep apnea)  -     Polysomnography 4 or More Parameters With CPAP; Future       Follow Up:   Return in about 3 months (around 6/23/2021) for F/U Obstructive Sleep Apnea.    I have advised the patient the need to continue the use of CPAP.  Gold standard for treatment of sleep apnea includes weight loss, use of cpap and avoidance of alcohol.  Untreated HUGO may increase the  risk for development of hypertension, stroke, myocardial infarction, diabetes, cardiovascular disease, work-related issues and driving accidents. I have counseled and advised the patient to avoid driving or operating heavy/dangerous equipment if feeling drowsy.     SHILPA Tabor, FNP-C  [facility] Neurology and Sleep Medicine       Please note that portions of this note may have been completed with a voice recognition program. Efforts were made to edit the dictations, but occasionally words are mistranscribed.

## 2021-04-04 ENCOUNTER — APPOINTMENT (OUTPATIENT)
Dept: PREADMISSION TESTING | Facility: HOSPITAL | Age: 56
End: 2021-04-04

## 2021-04-04 PROCEDURE — C9803 HOPD COVID-19 SPEC COLLECT: HCPCS

## 2021-04-04 PROCEDURE — U0004 COV-19 TEST NON-CDC HGH THRU: HCPCS

## 2021-04-05 LAB — SARS-COV-2 RNA NOSE QL NAA+PROBE: NOT DETECTED

## 2021-04-07 ENCOUNTER — HOSPITAL ENCOUNTER (OUTPATIENT)
Dept: SLEEP MEDICINE | Facility: HOSPITAL | Age: 56
Discharge: HOME | End: 2021-04-07
Admitting: NURSE PRACTITIONER

## 2021-04-07 DIAGNOSIS — R06.83 SNORING: ICD-10-CM

## 2021-04-07 DIAGNOSIS — G47.33 OSA (OBSTRUCTIVE SLEEP APNEA): ICD-10-CM

## 2021-04-07 DIAGNOSIS — G47.19 EXCESSIVE DAYTIME SLEEPINESS: ICD-10-CM

## 2021-04-07 DIAGNOSIS — R53.83 FATIGUE, UNSPECIFIED TYPE: ICD-10-CM

## 2021-04-07 PROCEDURE — 95811 POLYSOM 6/>YRS CPAP 4/> PARM: CPT | Performed by: INTERNAL MEDICINE

## 2021-04-07 PROCEDURE — 95811 POLYSOM 6/>YRS CPAP 4/> PARM: CPT

## 2021-04-13 ENCOUNTER — TELEPHONE (OUTPATIENT)
Dept: NEUROLOGY | Facility: CLINIC | Age: 56
End: 2021-04-13

## 2021-04-13 NOTE — TELEPHONE ENCOUNTER
Caller: Lea Berry    Relationship: Self    Best call back number: [phone]    What form or medical record are you requesting: OFFICE VISIT NOTES FROM NEW PATIENT SLEEP APPT 3/23/21 / SLEEP STUDY RESULTS    Who is requesting this form or medical record from you: [facility] FOR APPEAL    How would you like to receive the form or medical records (pick-up, mail, fax): FAX  If fax, what is the fax number: [phone] ATTN LEA BERRY    Timeframe paperwork needed: AS SOON AS YOU CAN, PATIENT 30-60 DAYS FILE     Additional notes: PATIENT'S INSURANCE DENIED HER SLEEP STUDY DUE TO NOT MEDICALLY NECESSARY, PATIENT CALLING FOR DOCUMENTATION TO APPEAL.

## 2021-04-14 NOTE — TELEPHONE ENCOUNTER
LEFT MSG FOR PT TO CALL OFFICE CONCERNING THE REQUEST FOR RECORDS.    PT WILL NEED TO COME INTO OUR OFFICE TO SIGN A RELEASE.  THEN I WILL SCAN TO [facility] WITH [employer] TO PROCESS THIS REQUEST. PATIENTS RECORDS WILL BE MAILED TO HER.     OUR OFFICE DOES NOT PROCESS RECORD REQUEST WITHIN OUR OFFICE.

## 2021-04-14 NOTE — TELEPHONE ENCOUNTER
ALICIA BERRY  PHONE # [phone]    CRYSTAL WAS RETURNING MORGAN'S PHONE CALL.THERE WAS NOT A NOTE SAID THAT MORGAN CALLED.  ATTEMPTED TO WARM TRANSFER AND WAS PUT ON HOLD FOR 10 MINUTES. PLEASE GIVER HER A CALL BACK ASAP. IF SHE IS UNABLE TO BE REACHED YOU CAN LEAVE A MESSAGE IN MY CHART.

## 2021-04-14 NOTE — TELEPHONE ENCOUNTER
Spoke with patient, gave her sleep study results. Patient stated she received a letter from her insurance company stating her sleep study was denied that it was not medically necessary. Gave patient billing phone number to call and ask about her study. Patient stated she was going to appeal it I  Let patient know if she needed her sleep study or records to let us know.

## 2021-09-28 ENCOUNTER — TRANSCRIBE ORDERS (OUTPATIENT)
Dept: LAB | Facility: HOSPITAL | Age: 56
End: 2021-09-28

## 2021-09-28 ENCOUNTER — LAB (OUTPATIENT)
Dept: LAB | Facility: HOSPITAL | Age: 56
End: 2021-09-28

## 2021-09-28 DIAGNOSIS — Z20.822 COVID-19 RULED OUT: ICD-10-CM

## 2021-09-28 DIAGNOSIS — Z20.822 COVID-19 RULED OUT: Primary | ICD-10-CM

## 2021-09-28 LAB — SARS-COV-2 RNA NOSE QL NAA+PROBE: NOT DETECTED

## 2021-09-28 PROCEDURE — U0004 COV-19 TEST NON-CDC HGH THRU: HCPCS

## 2021-09-28 PROCEDURE — C9803 HOPD COVID-19 SPEC COLLECT: HCPCS

## 2021-09-30 ENCOUNTER — OFFICE VISIT (OUTPATIENT)
Dept: NEUROLOGY | Facility: CLINIC | Age: 56
End: 2021-09-30

## 2021-09-30 VITALS
DIASTOLIC BLOOD PRESSURE: 78 MMHG | OXYGEN SATURATION: 98 % | BODY MASS INDEX: 50.02 KG/M2 | SYSTOLIC BLOOD PRESSURE: 138 MMHG | HEART RATE: 91 BPM | WEIGHT: 293 LBS | HEIGHT: 64 IN | TEMPERATURE: 97.3 F

## 2021-09-30 DIAGNOSIS — G47.33 OBSTRUCTIVE SLEEP APNEA: Primary | ICD-10-CM

## 2021-09-30 PROCEDURE — 99213 OFFICE O/P EST LOW 20 MIN: CPT | Performed by: NURSE PRACTITIONER

## 2021-09-30 NOTE — PROGRESS NOTES
New Sleep Patient Office Visit      Patient Name: Lea Thompson  : 1965   MRN: [id number]     Referring Physician: No ref. provider found    Chief Complaint:    Chief Complaint   Patient presents with   • Sleeping Problem       History of Present Illness: Lea Thompson is a 56 y.o. female who is here today to establish care with Sleep Medicine and was last seen on 3/23/2021.  PSG on 2021 showed an AHI of 24/hour and a good response to CPAP 8cm.  She was using her machine every night until she found out her CPAP machine was on the [employer] recall list.  She stopped using it until she could purchase a bacterial inline filter and is now using it every day.  She has had symptomatic improvement since starting CPAP therapy.  Her initial [place] score was 10 and it is 3 today.  She is using a full face mask and [employer].     Pertinent Medical History:   Current compliance report reviewed and has been scanned into the patient's medical record.    Subjective      Review of Systems:   Review of Systems   Constitutional: Negative for chills, fatigue and fever.   HENT: Negative for facial swelling, hearing loss, sore throat, tinnitus and trouble swallowing.    Eyes: Negative for blurred vision, double vision, photophobia and visual disturbance.   Respiratory: Positive for apnea. Negative for cough, chest tightness and shortness of breath.    Cardiovascular: Negative for chest pain, palpitations and leg swelling.   Gastrointestinal: Negative for abdominal pain, nausea and vomiting.   Endocrine: Negative for cold intolerance and heat intolerance.   Musculoskeletal: Negative for gait problem, neck pain and neck stiffness.   Skin: Negative for color change and rash.   Allergic/Immunologic: Negative for environmental allergies and food allergies.   Neurological: Negative for dizziness, syncope, speech difficulty, weakness, light-headedness, numbness, headache and memory problem.   Psychiatric/Behavioral:  "Negative for behavioral problems, sleep disturbance and depressed mood. The patient is not nervous/anxious.        Past Medical History:   Past Medical History:   Diagnosis Date   • Acid reflux    • Body piercing    • Choledocholithiasis 2/15/2017   • Cholelithiasis 2/15/2017   • Colon polyp    • Depression Dec 2017   • Dilated cbd, acquired 2017    Added automatically from request for surgery [id number]   • Headache Frequent   • History of nuclear stress test     \"IT WAS OK\".  AT [facility].   • Hyperlipidemia    • Obesity Currently   • Pancreatitis 2017    Related to ERCP   • PONV (postoperative nausea and vomiting)    • Sleep apnea     CPAP HS, INSTRUCTED TO BRING THIS IN THE DOS   • Wears glasses        Past Surgical History:   Past Surgical History:   Procedure Laterality Date   •  SECTION      x2   • CHOLECYSTECTOMY  2017   • CHOLECYSTECTOMY WITH INTRAOPERATIVE CHOLANGIOGRAM N/A 3/15/2017    Procedure: CHOLECYSTECTOMY LAPAROSCOPIC INTRAOPERATIVE CHOLANGIOGRAM;  Surgeon: Benjamin Reaves MD;  Location: [facility];  Service:    • COLON SURGERY      MUCOSAL RESECTION   • COLONOSCOPY     • FLEXIBLE SIGMOIDOSCOPY     • POLYPECTOMY     • [place] ERCP DX COLLECTION SPECIMEN BRUSHING/WASHING N/A 2017    Procedure: ENDOSCOPIC RETROGRADE CHOLANGIOPANCREATOGRAPHY WITH ENDOSCOPIC SPHINCTEROTOMY; LITHOTRIPSY; WIREGUIDED; BALLOON EXTRACTION (9MM/12MM); PLACEMENT OF 8.5 Japanese-7 CM BILIARY STENT; EXTRACTION OF STONE FROM RIGHT INTRAHEPATIC DUCT;  Surgeon: Chaitanya Stanley MD;  Location: [facility];  Service: Gastroenterology   • KS ERCP DX COLLECTION SPECIMEN BRUSHING/WASHING N/A 2017    Procedure: ENDOSCOPIC RETROGRADE CHOLANGIOPANCREATOGRAPHY with stent removal and balloon sweep;  Surgeon: Chaitanya Stanley MD;  Location: [facility];  Service: Gastroenterology   • TUBAL ABDOMINAL LIGATION     • WISDOM TOOTH EXTRACTION         Family History:   Family History   Problem Relation Age of " "Onset   • Sarcoidosis Mother    • Colon polyps Mother    • Other Mother         Sarcadosis   • Cancer Father         Prostrate   • Heart disease Father    • Pancreatitis Father         He is due to ercp   • Sleep apnea Father    • Diabetes Paternal Grandfather    • Heart disease Paternal Grandfather        Social History:   Social History     Socioeconomic History   • Marital status:      Spouse name: Not on file   • Number of children: Not on file   • Years of education: Not on file   • Highest education level: Not on file   Tobacco Use   • Smoking status: Current Every Day Smoker     Packs/day: 1.00     Years: 15.00     Pack years: 15.00     Types: Cigarettes   • Smokeless tobacco: Never Used   • Tobacco comment: Have quit twice the first time for 8 yrs second time for two years resumed 2015   Vaping Use   • Vaping Use: Never used   Substance and Sexual Activity   • Alcohol use: Yes     Types: 1 Glasses of wine per week     Comment: Occasionally   • Drug use: No   • Sexual activity: Yes     Partners: Male     Birth control/protection: Surgical     Comment:        Medications:   No current outpatient medications on file.    Allergies:   No Known Allergies    Objective     Physical Exam:  Vital Signs:   Vitals:    09/30/21 1338   BP: 138/78   Pulse: 91   Temp: 97.3 °F (36.3 °C)   SpO2: 98%   Weight: (!) 148 kg (327 lb)   Height: 162.6 cm (64\")   PainSc: 0-No pain     BMI: Body mass index is 56.13 kg/m².    Physical Exam  Vitals and nursing note reviewed.   Constitutional:       General: She is not in acute distress.     Appearance: She is well-developed. She is obese. She is not diaphoretic.   HENT:      Head: Normocephalic and atraumatic.   Eyes:      Conjunctiva/sclera: Conjunctivae normal.      Pupils: Pupils are equal, round, and reactive to light.   Pulmonary:      Effort: Pulmonary effort is normal. No respiratory distress.   Skin:     General: Skin is dry.      Findings: No rash.   Neurological: "      Mental Status: She is alert and oriented to person, place, and time.   Psychiatric:         Mood and Affect: Mood normal.         Behavior: Behavior normal.         Thought Content: Thought content normal.         Judgment: Judgment normal.         Assessment / Plan      Assessment/Plan:   Diagnoses and all orders for this visit:    1. Obstructive sleep apnea (Primary)    2. BMI 50.0-59.9, adult (HCC)    *Advised patient to continue use of her CPAP machine with the bacterial in line filter in place, until her machine can be replaced.   *Advised patient to avoid driving if drowsy.   *Encouraged weight loss with a BMI goal of 24.      Follow Up:   Return in about 3 months (around 12/30/2021) for F/U Obstructive Sleep Apnea.    I have advised the patient the need to continue the use of CPAP.  Gold standard for treatment of sleep apnea includes weight loss, use of cpap and avoidance of alcohol.  Untreated HUGO may increase the risk for development of hypertension, stroke, myocardial infarction, diabetes, cardiovascular disease, work-related issues and driving accidents. I have counseled and advised the patient to avoid driving or operating heavy/dangerous equipment if feeling drowsy.     SHILPA Tabor, FNP-C  [facility]       Please note that portions of this note may have been completed with a voice recognition program. Efforts were made to edit the dictations, but occasionally words are mistranscribed.

## 2021-12-30 ENCOUNTER — OFFICE VISIT (OUTPATIENT)
Dept: NEUROLOGY | Facility: CLINIC | Age: 56
End: 2021-12-30

## 2021-12-30 VITALS
TEMPERATURE: 97.3 F | DIASTOLIC BLOOD PRESSURE: 64 MMHG | OXYGEN SATURATION: 99 % | HEART RATE: 80 BPM | SYSTOLIC BLOOD PRESSURE: 128 MMHG | HEIGHT: 64 IN | WEIGHT: 293 LBS | BODY MASS INDEX: 50.02 KG/M2

## 2021-12-30 DIAGNOSIS — G47.33 OBSTRUCTIVE SLEEP APNEA: Primary | ICD-10-CM

## 2021-12-30 PROCEDURE — 99213 OFFICE O/P EST LOW 20 MIN: CPT | Performed by: NURSE PRACTITIONER

## 2021-12-30 NOTE — PROGRESS NOTES
New Sleep Patient Office Visit      Patient Name: Lea Thompson  : 1965   MRN: [id number]     Referring Physician: No ref. provider found    Chief Complaint:    Chief Complaint   Patient presents with   • Follow-up     Pt in office to follow up on HUGO. Pt states she is doing well.        History of Present Illness: Lea Thompson is a 56 y.o. female who is here today to follow up with HUGO and was last seen on 2021.  Currently on CPAP 8cm, compliance 100%, AHI 5.4/hour.  She received her new machine about 3 weeks ago.  She is tolerating her pressure well.  She is using [employer]; full face mask and regular tubing.  She has no questions or concerns today.  She is planning on working on weight loss over the next year.  Additional risk factors- BMI 55, cigarette smoker.     Pertinent Medical History:   Current compliance report reviewed and has been scanned into the patient's medical record.    Subjective      Review of Systems:   Review of Systems   Constitutional: Negative for chills, fatigue and fever.   HENT: Negative for facial swelling, hearing loss, sore throat, tinnitus and trouble swallowing.    Eyes: Negative for blurred vision, double vision, photophobia and visual disturbance.   Respiratory: Positive for apnea. Negative for cough, chest tightness and shortness of breath.    Cardiovascular: Negative for chest pain, palpitations and leg swelling.   Gastrointestinal: Negative for abdominal pain, nausea and vomiting.   Endocrine: Negative for cold intolerance and heat intolerance.   Musculoskeletal: Negative for gait problem, neck pain and neck stiffness.   Skin: Negative for color change and rash.   Allergic/Immunologic: Negative for environmental allergies and food allergies.   Neurological: Negative for dizziness, syncope, speech difficulty, weakness, light-headedness, numbness, headache and memory problem.   Psychiatric/Behavioral: Negative for behavioral problems, sleep disturbance  "and depressed mood. The patient is not nervous/anxious.        Past Medical History:   Past Medical History:   Diagnosis Date   • Acid reflux    • Body piercing    • Choledocholithiasis 2/15/2017   • Cholelithiasis 2/15/2017   • Colon polyp    • Depression Dec 2017   • Dilated cbd, acquired 2017    Added automatically from request for surgery [id number]   • Headache Frequent   • History of nuclear stress test     \"IT WAS OK\".  AT [facility].   • Hyperlipidemia    • Obesity Currently   • Pancreatitis 2017    Related to ERCP   • PONV (postoperative nausea and vomiting)    • Sleep apnea     CPAP , INSTRUCTED TO BRING THIS IN THE DOS   • Wears glasses        Past Surgical History:   Past Surgical History:   Procedure Laterality Date   •  SECTION      x2   • CHOLECYSTECTOMY  2017   • CHOLECYSTECTOMY WITH INTRAOPERATIVE CHOLANGIOGRAM N/A 3/15/2017    Procedure: CHOLECYSTECTOMY LAPAROSCOPIC INTRAOPERATIVE CHOLANGIOGRAM;  Surgeon: Benjamin Reaves MD;  Location: [facility] OR;  Service:    • COLON SURGERY      MUCOSAL RESECTION   • COLONOSCOPY     • FLEXIBLE SIGMOIDOSCOPY     • POLYPECTOMY     • [place] ERCP DX COLLECTION SPECIMEN BRUSHING/WASHING N/A 2017    Procedure: ENDOSCOPIC RETROGRADE CHOLANGIOPANCREATOGRAPHY WITH ENDOSCOPIC SPHINCTEROTOMY; LITHOTRIPSY; WIREGUIDED; BALLOON EXTRACTION (9MM/12MM); PLACEMENT OF 8.5 Slovenian-7 CM BILIARY STENT; EXTRACTION OF STONE FROM RIGHT INTRAHEPATIC DUCT;  Surgeon: Chaitanya Stanley MD;  Location: [facility] OR;  Service: Gastroenterology   • MA ERCP DX COLLECTION SPECIMEN BRUSHING/WASHING N/A 2017    Procedure: ENDOSCOPIC RETROGRADE CHOLANGIOPANCREATOGRAPHY with stent removal and balloon sweep;  Surgeon: Chaitanya Stanley MD;  Location: [facility] OR;  Service: Gastroenterology   • TUBAL ABDOMINAL LIGATION     • WISDOM TOOTH EXTRACTION         Family History:   Family History   Problem Relation Age of Onset   • Sarcoidosis Mother    • Colon polyps Mother  " "  • Other Mother         Sarcadosis   • Cancer Father         Prostrate   • Heart disease Father    • Pancreatitis Father         He is due to ercp   • Sleep apnea Father    • Diabetes Paternal Grandfather    • Heart disease Paternal Grandfather        Social History:   Social History     Socioeconomic History   • Marital status:    Tobacco Use   • Smoking status: Current Every Day Smoker     Packs/day: 1.00     Years: 15.00     Pack years: 15.00     Types: Cigarettes   • Smokeless tobacco: Never Used   • Tobacco comment: Have quit twice the first time for 8 yrs second time for two years resumed 2015   Vaping Use   • Vaping Use: Never used   Substance and Sexual Activity   • Alcohol use: Yes     Types: 1 Glasses of wine per week     Comment: Occasionally   • Drug use: No   • Sexual activity: Yes     Partners: Male     Birth control/protection: Surgical     Comment:        Medications:   No current outpatient medications on file.    Allergies:   No Known Allergies    Objective     Physical Exam:  Vital Signs:   Vitals:    12/30/21 1401   BP: 128/64   Pulse: 80   Temp: 97.3 °F (36.3 °C)   SpO2: 99%   Weight: (!) 146 kg (321 lb)   Height: 162.6 cm (64\")   PainSc: 0-No pain     BMI: Body mass index is 55.1 kg/m².    Physical Exam  Vitals and nursing note reviewed.   Constitutional:       General: She is not in acute distress.     Appearance: She is well-developed. She is obese. She is not diaphoretic.   HENT:      Head: Normocephalic and atraumatic.   Eyes:      Extraocular Movements: Extraocular movements intact.      Conjunctiva/sclera: Conjunctivae normal.   Pulmonary:      Effort: Pulmonary effort is normal. No respiratory distress.   Skin:     General: Skin is dry.      Findings: No rash.   Neurological:      Mental Status: She is alert and oriented to person, place, and time.   Psychiatric:         Mood and Affect: Mood normal.         Behavior: Behavior normal.         Thought Content: Thought " content normal.         Judgment: Judgment normal.         Assessment / Plan      Assessment/Plan:   Diagnoses and all orders for this visit:    1. Obstructive sleep apnea (Primary)    2. BMI 50.0-59.9, adult (HCC)    *Encouraged weight loss with a BMI goal of 24.     Follow Up:   Return in about 1 year (around 12/30/2022) for Botox.    I have advised the patient the need to continue the use of CPAP.  Gold standard for treatment of sleep apnea includes weight loss, use of cpap and avoidance of alcohol.  Untreated HUGO may increase the risk for development of hypertension, stroke, myocardial infarction, diabetes, cardiovascular disease, work-related issues and driving accidents. I have counseled and advised the patient to avoid driving or operating heavy/dangerous equipment if feeling drowsy.     SHILPA Tabor, FNP-C  [facility]       Please note that portions of this note may have been completed with a voice recognition program. Efforts were made to edit the dictations, but occasionally words are mistranscribed.

## 2022-12-21 NOTE — PROGRESS NOTES
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 GI consulted for endoscopic evaluation for dysphagia.     #Dysphagia  Patient presenting with complaints of malaise, chills, hip pain x 1 day, found to be bacteremic, suspected to be oropharyngeal in etiology, currently undergoing cardiology evaluation to rule out infective endocarditis; concurrently reporting new dysphagia to both solids and liquids since admission. EGD (as noted below) with findings suggestive of candida esophagitis, otherwise no gross pathology. Suspect dysphagia related to candidiasis, no appreciable structural explanation for dysphagia.    -EGD (12/21): Scattered white plaques distributed through esophagus, appears to be consistent with candida esophagitis. Diffuse erythema of the mucosa was noted in the antrum. These findings are compatible with non-erosive gastritis.  Normal duodenum.  -QTC prolonged 522 on admission, recommend repeat EKG to re-evaluate. If no longer prolonged, can initiate tx with Fluconazole 400 mg PO x1, followed by 200 mg PO daily x 20 days   -Speech & swallow evaluation     #Dilated colon   Noted on Abdominal Xray to have markedly dilated colon with CT A/P noting stool burden. No obstruction appreciated.  -Bowel regimen, monitor stool counts   -Consider tap water enema x1 followed by daily bowl regimen (i.e. Miralax daily)    Case discussed with svc attending and primary team.     Magali Alonso DO  Gastroenterology Fellow  Pager: [phone]

## 2023-10-18 ENCOUNTER — TRANSCRIBE ORDERS (OUTPATIENT)
Dept: ADMINISTRATIVE | Facility: HOSPITAL | Age: 58
End: 2023-10-18
Payer: COMMERCIAL

## 2023-10-18 DIAGNOSIS — Z12.31 VISIT FOR SCREENING MAMMOGRAM: Primary | ICD-10-CM

## 2025-02-24 ENCOUNTER — APPOINTMENT (OUTPATIENT)
Dept: GENERAL RADIOLOGY | Facility: HOSPITAL | Age: 60
End: 2025-02-24
Payer: COMMERCIAL

## 2025-02-24 ENCOUNTER — HOSPITAL ENCOUNTER (EMERGENCY)
Facility: HOSPITAL | Age: 60
Discharge: HOME | End: 2025-02-24
Attending: STUDENT IN AN ORGANIZED HEALTH CARE EDUCATION/TRAINING PROGRAM | Admitting: STUDENT IN AN ORGANIZED HEALTH CARE EDUCATION/TRAINING PROGRAM
Payer: COMMERCIAL

## 2025-02-24 VITALS
TEMPERATURE: 98.2 F | WEIGHT: 290 LBS | SYSTOLIC BLOOD PRESSURE: 153 MMHG | OXYGEN SATURATION: 100 % | HEART RATE: 84 BPM | BODY MASS INDEX: 49.51 KG/M2 | DIASTOLIC BLOOD PRESSURE: 85 MMHG | RESPIRATION RATE: 18 BRPM | HEIGHT: 64 IN

## 2025-02-24 DIAGNOSIS — J22 LOWER RESPIRATORY TRACT INFECTION: ICD-10-CM

## 2025-02-24 DIAGNOSIS — J32.0 MAXILLARY SINUSITIS, UNSPECIFIED CHRONICITY: ICD-10-CM

## 2025-02-24 DIAGNOSIS — R73.9 HYPERGLYCEMIA: Primary | ICD-10-CM

## 2025-02-24 DIAGNOSIS — R03.0 ELEVATED BLOOD PRESSURE READING: ICD-10-CM

## 2025-02-24 LAB
ALBUMIN SERPL-MCNC: 4 G/DL (ref 3.5–5.2)
ALBUMIN/GLOB SERPL: 1.4 G/DL
ALP SERPL-CCNC: 106 U/L (ref 39–117)
ALT SERPL W P-5'-P-CCNC: 20 U/L (ref 1–33)
ANION GAP SERPL CALCULATED.3IONS-SCNC: 13 MMOL/L (ref 5–15)
AST SERPL-CCNC: 21 U/L (ref 1–32)
BASOPHILS # BLD AUTO: 0.03 10*3/MM3 (ref 0–0.2)
BASOPHILS NFR BLD AUTO: 0.4 % (ref 0–1.5)
BILIRUB SERPL-MCNC: 0.7 MG/DL (ref 0–1.2)
BUN SERPL-MCNC: 7 MG/DL (ref 6–20)
BUN/CREAT SERPL: 10 (ref 7–25)
CALCIUM SPEC-SCNC: 8.8 MG/DL (ref 8.6–10.5)
CHLORIDE SERPL-SCNC: 100 MMOL/L (ref 98–107)
CO2 SERPL-SCNC: 26 MMOL/L (ref 22–29)
CREAT SERPL-MCNC: 0.7 MG/DL (ref 0.57–1)
D DIMER PPP FEU-MCNC: 0.36 MCGFEU/ML (ref 0–0.57)
DEPRECATED RDW RBC AUTO: 41.1 FL (ref 37–54)
EGFRCR SERPLBLD CKD-EPI 2021: 101 ML/MIN/1.73
EOSINOPHIL # BLD AUTO: 0.15 10*3/MM3 (ref 0–0.4)
EOSINOPHIL NFR BLD AUTO: 1.9 % (ref 0.3–6.2)
ERYTHROCYTE [DISTWIDTH] IN BLOOD BY AUTOMATED COUNT: 12.1 % (ref 12.3–15.4)
FLUAV SUBTYP SPEC NAA+PROBE: DETECTED
FLUBV RNA ISLT QL NAA+PROBE: NOT DETECTED
GEN 5 1HR TROPONIN T REFLEX: <6 NG/L
GLOBULIN UR ELPH-MCNC: 2.9 GM/DL
GLUCOSE SERPL-MCNC: 242 MG/DL (ref 65–99)
HCT VFR BLD AUTO: 43.4 % (ref 34–46.6)
HGB BLD-MCNC: 15.5 G/DL (ref 12–15.9)
HOLD SPECIMEN: NORMAL
HOLD SPECIMEN: NORMAL
IMM GRANULOCYTES # BLD AUTO: 0.06 10*3/MM3 (ref 0–0.05)
IMM GRANULOCYTES NFR BLD AUTO: 0.8 % (ref 0–0.5)
LYMPHOCYTES # BLD AUTO: 2.01 10*3/MM3 (ref 0.7–3.1)
LYMPHOCYTES NFR BLD AUTO: 25.2 % (ref 19.6–45.3)
MCH RBC QN AUTO: 33 PG (ref 26.6–33)
MCHC RBC AUTO-ENTMCNC: 35.7 G/DL (ref 31.5–35.7)
MCV RBC AUTO: 92.5 FL (ref 79–97)
MONOCYTES # BLD AUTO: 0.61 10*3/MM3 (ref 0.1–0.9)
MONOCYTES NFR BLD AUTO: 7.6 % (ref 5–12)
NEUTROPHILS NFR BLD AUTO: 5.13 10*3/MM3 (ref 1.7–7)
NEUTROPHILS NFR BLD AUTO: 64.1 % (ref 42.7–76)
NRBC BLD AUTO-RTO: 0 /100 WBC (ref 0–0.2)
NT-PROBNP SERPL-MCNC: 299 PG/ML (ref 0–900)
PLATELET # BLD AUTO: 158 10*3/MM3 (ref 140–450)
PMV BLD AUTO: 10.9 FL (ref 6–12)
POTASSIUM SERPL-SCNC: 3.7 MMOL/L (ref 3.5–5.2)
PROT SERPL-MCNC: 6.9 G/DL (ref 6–8.5)
RBC # BLD AUTO: 4.69 10*6/MM3 (ref 3.77–5.28)
SARS-COV-2 RNA RESP QL NAA+PROBE: NOT DETECTED
SODIUM SERPL-SCNC: 139 MMOL/L (ref 136–145)
TROPONIN T NUMERIC DELTA: NORMAL
TROPONIN T SERPL HS-MCNC: 7 NG/L
WBC NRBC COR # BLD AUTO: 7.99 10*3/MM3 (ref 3.4–10.8)
WHOLE BLOOD HOLD COAG: NORMAL
WHOLE BLOOD HOLD SPECIMEN: NORMAL

## 2025-02-24 PROCEDURE — 83880 ASSAY OF NATRIURETIC PEPTIDE: CPT | Performed by: STUDENT IN AN ORGANIZED HEALTH CARE EDUCATION/TRAINING PROGRAM

## 2025-02-24 PROCEDURE — 99284 EMERGENCY DEPT VISIT MOD MDM: CPT | Performed by: STUDENT IN AN ORGANIZED HEALTH CARE EDUCATION/TRAINING PROGRAM

## 2025-02-24 PROCEDURE — 87636 SARSCOV2 & INF A&B AMP PRB: CPT | Performed by: STUDENT IN AN ORGANIZED HEALTH CARE EDUCATION/TRAINING PROGRAM

## 2025-02-24 PROCEDURE — 36415 COLL VENOUS BLD VENIPUNCTURE: CPT

## 2025-02-24 PROCEDURE — 93005 ELECTROCARDIOGRAM TRACING: CPT | Performed by: STUDENT IN AN ORGANIZED HEALTH CARE EDUCATION/TRAINING PROGRAM

## 2025-02-24 PROCEDURE — 85025 COMPLETE CBC W/AUTO DIFF WBC: CPT | Performed by: STUDENT IN AN ORGANIZED HEALTH CARE EDUCATION/TRAINING PROGRAM

## 2025-02-24 PROCEDURE — 80053 COMPREHEN METABOLIC PANEL: CPT | Performed by: STUDENT IN AN ORGANIZED HEALTH CARE EDUCATION/TRAINING PROGRAM

## 2025-02-24 PROCEDURE — 71045 X-RAY EXAM CHEST 1 VIEW: CPT

## 2025-02-24 PROCEDURE — 84484 ASSAY OF TROPONIN QUANT: CPT | Performed by: STUDENT IN AN ORGANIZED HEALTH CARE EDUCATION/TRAINING PROGRAM

## 2025-02-24 PROCEDURE — 85379 FIBRIN DEGRADATION QUANT: CPT | Performed by: STUDENT IN AN ORGANIZED HEALTH CARE EDUCATION/TRAINING PROGRAM

## 2025-02-24 RX ORDER — ACETAMINOPHEN 500 MG
1000 TABLET ORAL ONCE
Status: COMPLETED | OUTPATIENT
Start: 2025-02-24 | End: 2025-02-24

## 2025-02-24 RX ORDER — FLUCONAZOLE 150 MG/1
150 TABLET ORAL ONCE
Qty: 1 TABLET | Refills: 0 | Status: SHIPPED | OUTPATIENT
Start: 2025-02-24 | End: 2025-02-24

## 2025-02-24 RX ORDER — SODIUM CHLORIDE 0.9 % (FLUSH) 0.9 %
10 SYRINGE (ML) INJECTION AS NEEDED
Status: DISCONTINUED | OUTPATIENT
Start: 2025-02-24 | End: 2025-02-24 | Stop reason: HOSPADM

## 2025-02-24 RX ADMIN — ACETAMINOPHEN 1000 MG: 500 TABLET, FILM COATED ORAL at 09:33

## 2025-02-24 NOTE — ED PROVIDER NOTES
"         [facility] EMERGENCY DEPARTMENT  Emergency Department Encounter  Emergency Medicine Physician Note       Pt Name: Lea Thompson  MRN: [id number]  Pt :   1965  Room Number:    Date of encounter:  2025  PCP: Marianela Espinal MD  ED Provider: Conner Mccray MD    Historian: Patient      HPI:  Chief Complaint: Cough, shortness of breath        Context: Lea Thompson is a 59 y.o. female who presents to the ED for cough, shortness of breath.  Patient reports symptoms ongoing for the past several weeks.  She states she tested positive for the flu earlier in the month around the third.  Since then she has had waxing and waning symptoms.  She states for the past several days she has had worsening cough productive of clear sputum with associated shortness of breath.  She also reports sinus congestion and pain.      PAST MEDICAL HISTORY  Past Medical History:   Diagnosis Date    Acid reflux     Body piercing     Choledocholithiasis 2/15/2017    Cholelithiasis 2/15/2017    Colon polyp     Depression Dec 2017    Dilated cbd, acquired 2017    Added automatically from request for surgery [id number]    Headache Frequent    History of nuclear stress test     \"IT WAS OK\".  AT [facility].    Hyperlipidemia     Obesity Currently    Pancreatitis 2017    Related to ERCP    PONV (postoperative nausea and vomiting)     Sleep apnea     CPAP , INSTRUCTED TO BRING THIS IN THE DOS    Wears glasses          PAST SURGICAL HISTORY  Past Surgical History:   Procedure Laterality Date     SECTION      x2    CHOLECYSTECTOMY  2017    CHOLECYSTECTOMY WITH INTRAOPERATIVE CHOLANGIOGRAM N/A 3/15/2017    Procedure: CHOLECYSTECTOMY LAPAROSCOPIC INTRAOPERATIVE CHOLANGIOGRAM;  Surgeon: eBnjamin Reaves MD;  Location: [facility] OR;  Service:     COLON SURGERY      MUCOSAL RESECTION    COLONOSCOPY      FLEXIBLE SIGMOIDOSCOPY  2003    POLYPECTOMY      SC ERCP DX COLLECTION SPECIMEN " BRUSHING/WASHING N/A 2/16/2017    Procedure: ENDOSCOPIC RETROGRADE CHOLANGIOPANCREATOGRAPHY WITH ENDOSCOPIC SPHINCTEROTOMY; LITHOTRIPSY; WIREGUIDED; BALLOON EXTRACTION (9MM/12MM); PLACEMENT OF 8.5 Dominican-7 CM BILIARY STENT; EXTRACTION OF STONE FROM RIGHT INTRAHEPATIC DUCT;  Surgeon: Chaitanya Stanley MD;  Location: [facility] OR;  Service: Gastroenterology    VT ERCP DX COLLECTION SPECIMEN BRUSHING/WASHING N/A 11/30/2017    Procedure: ENDOSCOPIC RETROGRADE CHOLANGIOPANCREATOGRAPHY with stent removal and balloon sweep;  Surgeon: Chaitanya Stanley MD;  Location: [facility] OR;  Service: Gastroenterology    TUBAL ABDOMINAL LIGATION  1990    WISDOM TOOTH EXTRACTION           FAMILY HISTORY  Family History   Problem Relation Age of Onset    Sarcoidosis Mother     Colon polyps Mother     Other Mother         Sarcadosis    Cancer Father         Prostrate    Heart disease Father     Pancreatitis Father         He is due to ercp    Sleep apnea Father     Diabetes Paternal Grandfather     Heart disease Paternal Grandfather          SOCIAL HISTORY  Social History     Socioeconomic History    Marital status:    Tobacco Use    Smoking status: Every Day     Current packs/day: 1.00     Average packs/day: 1 pack/day for 15.0 years (15.0 ttl pk-yrs)     Types: Cigarettes    Smokeless tobacco: Never    Tobacco comments:     Have quit twice the first time for 8 yrs second time for two years resumed 2015   Vaping Use    Vaping status: Never Used   Substance and Sexual Activity    Alcohol use: Yes     Types: 1 Glasses of wine per week     Comment: Occasionally    Drug use: No    Sexual activity: Yes     Partners: Male     Birth control/protection: Surgical     Comment:          ALLERGIES  Patient has no known allergies.        REVIEW OF SYSTEMS  As noted in HPI      PHYSICAL EXAM    I have reviewed the triage vital signs and nursing notes.    ED Triage Vitals [02/24/25 0719]   Temp Heart Rate Resp BP SpO2   98.2 °F (36.8 °C) 98 18 (!)  179/101 94 %      Temp src Heart Rate Source Patient Position BP Location FiO2 (%)   Oral Monitor -- Left arm --       Physical Exam  Constitutional:       General: She is not in acute distress.  HENT:      Head: Atraumatic.      Nose:      Right Sinus: Maxillary sinus tenderness present.   Eyes:      Extraocular Movements: Extraocular movements intact.   Cardiovascular:      Rate and Rhythm: Normal rate.      Pulses: Normal pulses.   Pulmonary:      Effort: Pulmonary effort is normal. No respiratory distress.      Breath sounds: No wheezing.      Comments: Coarse breath sounds right lower lung base  Abdominal:      General: There is no distension.      Palpations: Abdomen is soft.   Musculoskeletal:         General: No deformity.      Cervical back: Neck supple.      Right lower leg: No edema.      Left lower leg: No edema.   Skin:     General: Skin is warm.   Neurological:      General: No focal deficit present.      Mental Status: She is alert.         LAB RESULTS  Recent Results (from the past 24 hours)   Comprehensive Metabolic Panel    Collection Time: 02/24/25  7:42 AM    Specimen: Blood   Result Value Ref Range    Glucose 242 (H) 65 - 99 mg/dL    BUN 7 6 - 20 mg/dL    Creatinine 0.70 0.57 - 1.00 mg/dL    Sodium 139 136 - 145 mmol/L    Potassium 3.7 3.5 - 5.2 mmol/L    Chloride 100 98 - 107 mmol/L    CO2 26.0 22.0 - 29.0 mmol/L    Calcium 8.8 8.6 - 10.5 mg/dL    Total Protein 6.9 6.0 - 8.5 g/dL    Albumin 4.0 3.5 - 5.2 g/dL    ALT (SGPT) 20 1 - 33 U/L    AST (SGOT) 21 1 - 32 U/L    Alkaline Phosphatase 106 39 - 117 U/L    Total Bilirubin 0.7 0.0 - 1.2 mg/dL    Globulin 2.9 gm/dL    A/G Ratio 1.4 g/dL    BUN/Creatinine Ratio 10.0 7.0 - 25.0    Anion Gap 13.0 5.0 - 15.0 mmol/L    eGFR 101.0 >60.0 mL/min/1.73   BNP    Collection Time: 02/24/25  7:42 AM    Specimen: Blood   Result Value Ref Range    proBNP 299.0 0.0 - 900.0 pg/mL   High Sensitivity Troponin T    Collection Time: 02/24/25  7:42 AM    Specimen:  Blood   Result Value Ref Range    HS Troponin T 7 <14 ng/L   COVID-19 and FLU A/B PCR, 1 HR TAT - Swab, Nasopharynx    Collection Time: 02/24/25  7:42 AM    Specimen: Nasopharynx; Swab   Result Value Ref Range    COVID19 Not Detected Not Detected - Ref. Range    Influenza A PCR Detected (A) Not Detected    Influenza B PCR Not Detected Not Detected   Green Top (Gel)    Collection Time: 02/24/25  7:42 AM   Result Value Ref Range    Extra Tube Hold for add-ons.    Lavender Top    Collection Time: 02/24/25  7:42 AM   Result Value Ref Range    Extra Tube hold for add-on    Gold Top - SST    Collection Time: 02/24/25  7:42 AM   Result Value Ref Range    Extra Tube Hold for add-ons.    Light Blue Top    Collection Time: 02/24/25  7:42 AM   Result Value Ref Range    Extra Tube Hold for add-ons.    CBC Auto Differential    Collection Time: 02/24/25  7:42 AM    Specimen: Blood   Result Value Ref Range    WBC 7.99 3.40 - 10.80 10*3/mm3    RBC 4.69 3.77 - 5.28 10*6/mm3    Hemoglobin 15.5 12.0 - 15.9 g/dL    Hematocrit 43.4 34.0 - 46.6 %    MCV 92.5 79.0 - 97.0 fL    MCH 33.0 26.6 - 33.0 pg    MCHC 35.7 31.5 - 35.7 g/dL    RDW 12.1 (L) 12.3 - 15.4 %    RDW-SD 41.1 37.0 - 54.0 fl    MPV 10.9 6.0 - 12.0 fL    Platelets 158 140 - 450 10*3/mm3    Neutrophil % 64.1 42.7 - 76.0 %    Lymphocyte % 25.2 19.6 - 45.3 %    Monocyte % 7.6 5.0 - 12.0 %    Eosinophil % 1.9 0.3 - 6.2 %    Basophil % 0.4 0.0 - 1.5 %    Immature Grans % 0.8 (H) 0.0 - 0.5 %    Neutrophils, Absolute 5.13 1.70 - 7.00 10*3/mm3    Lymphocytes, Absolute 2.01 0.70 - 3.10 10*3/mm3    Monocytes, Absolute 0.61 0.10 - 0.90 10*3/mm3    Eosinophils, Absolute 0.15 0.00 - 0.40 10*3/mm3    Basophils, Absolute 0.03 0.00 - 0.20 10*3/mm3    Immature Grans, Absolute 0.06 (H) 0.00 - 0.05 10*3/mm3    nRBC 0.0 0.0 - 0.2 /100 WBC   D-dimer, Quantitative    Collection Time: 02/24/25  7:42 AM    Specimen: Blood   Result Value Ref Range    D-Dimer, Quantitative 0.36 0.00 - 0.57 MCGFEU/mL    High Sensitivity Troponin T 1Hr    Collection Time: 02/24/25  9:13 AM    Specimen: Blood   Result Value Ref Range    HS Troponin T <6 <14 ng/L    Troponin T Numeric Delta         If labs were ordered, I independently reviewed the results and considered them in treating the patient.        RADIOLOGY  XR Chest 1 View    Result Date: 2/24/2025  PROCEDURE: XR CHEST 1 VW-  HISTORY: SOA Triage Protocol, 6 since February 3, now increased shortness of breath.  COMPARISON: November 27, 2017..  FINDINGS: The heart is normal in size. Lungs are clear except for minimal linear density left lung base, consider minimal atelectasis or scar. No area of consolidation seen.. The mediastinum is unremarkable. There is no pneumothorax.  There are no acute osseous abnormalities. Apical lordotic positioning noted.      Minimal left basilar atelectasis or scar otherwise stable exam..     This report was signed and finalized on 2/24/2025 8:30 AM by Neisha Avalos MD.       PROCEDURES    Procedures    ECG 12 Lead ED Triage Standing Order; SOA   Final Result          MEDICATIONS GIVEN IN ER    Medications   acetaminophen (TYLENOL) tablet 1,000 mg (1,000 mg Oral Given 2/24/25 0933)         MEDICAL DECISION MAKING, PROGRESS, and CONSULTS    All labs, if obtained, have been independently reviewed by me.  All radiology studies, if obtained, have been reviewed by me and the radiologist dictating the report.  All EKG's, if obtained, have been independently viewed and interpreted by me.      Discussion below represents my analysis of pertinent findings related to patient's condition, differential diagnosis, treatment plan and final disposition.                         Differential diagnosis:    Pneumonia, PE, CHF, bronchitis, sinusitis, others.      Additional sources:    - Discussed/ obtained information from independent historians:      - External (non-ED) record review: Progress note neurology 12/30/2021    - Chronic or social conditions impacting  care:      - Shared decision making:        Orders placed during this visit:  Orders Placed This Encounter   Procedures    COVID PRE-OP / PRE-PROCEDURE SCREENING ORDER (NO ISOLATION) - Swab, Nasopharynx    COVID-19 and FLU A/B PCR, 1 HR TAT - Swab, Nasopharynx    XR Chest 1 View    Oil Trough Draw    Comprehensive Metabolic Panel    BNP    High Sensitivity Troponin T    CBC Auto Differential    High Sensitivity Troponin T 1Hr    D-dimer, Quantitative    Undress & Gown    Continuous Pulse Oximetry    Vital Signs    ECG 12 Lead ED Triage Standing Order; SOA    CBC & Differential    Green Top (Gel)    Lavender Top    Gold Top - SST    Light Blue Top         Additional orders considered but not ordered:      ED Course/MDM Discussion:    Patient is a 59-year-old female who presents for continued symptoms after being diagnosed with the flu earlier in the month.    Patient is afebrile nontoxic-appearing on presentation.  She has elevated blood pressure reading.    Labs are reassuring she has no leukocytosis her glucose is elevated she denies prior history of diabetes.  Flu swab is positive as expected.  On x-ray she does have some mild haziness in the right lower lung base consistent with examination concerning for superimposed pneumonia.  Will treat with antibiotics.  Patient does not appear septic.  Considered VTE however D-dimer normal.  Atypical ACS is not suspected clinically.  EKG demonstrated sinus rhythm with rate of 81 normal axis normal intervals no acute ST elevations on my interpretation.  Troponin negative at 0 and 1 hour.  Or chest pain again diagnosis felt unlikely.  She was given Tylenol for sinus pressure and pain.  Will treat with antibiotics as discussed.  Patient not septic appearing stable and appropriate for continued outpatient management with strict return precautions.  Patient agreeable to plan.  Counseled on incidental findings including elevated blood pressure reading as well as hyperglycemia  recommended further evaluation on an outpatient basis for these.                        Consultants:      Shared Decision Making:  After my consideration of clinical presentation and any laboratory/radiology studies obtained, I discussed the findings with the patient/patient representative who is in agreement with the treatment plan and the final disposition.   Risks and benefits of discharge and/or observation/admission were discussed.       AS OF 16:53 EST VITALS:    BP - 153/85  HR - 84  TEMP - 98.2 °F (36.8 °C) (Oral)  O2 SATS - 100%                  DIAGNOSIS  Final diagnoses:   Hyperglycemia   Elevated blood pressure reading   Maxillary sinusitis, unspecified chronicity   Lower respiratory tract infection         DISPOSITION  ED Disposition       ED Disposition   Discharge    Condition   Stable    Comment   --                   Please note that portions of this document were completed with voice recognition software.        Conner Mccray MD  02/24/25 [id number]

## 2025-02-24 NOTE — DISCHARGE INSTRUCTIONS
Take antibiotic as prescribed for sinus infection and lower respiratory tract infection  Please follow-up with your primary care provider to monitor your symptoms as well as for elevated blood pressure reading and elevated blood glucose found on today's evaluation  Continue other symptomatic over-the-counter therapies as needed for relief  Do not hesitate to return if symptoms worsen in any way

## (undated) DEVICE — MEDI-VAC NON-CONDUCTIVE SUCTION TUBING: Brand: CARDINAL HEALTH

## (undated) DEVICE — CUFF SCD HEMOFORCE SEQ CALF STD MD

## (undated) DEVICE — RETRIEVAL BALLOON CATHETER: Brand: EXTRACTOR™ PRO RX

## (undated) DEVICE — SNAR POLYP SENSATION STDOVL 27 240 BX40

## (undated) DEVICE — CLAVICLE STRAP: Brand: DEROYAL

## (undated) DEVICE — JELLY,LUBE,STERILE,FLIP TOP,TUBE,2-OZ: Brand: MEDLINE

## (undated) DEVICE — GLV SURG TRIUMPH LT PF LTX 8.5 STRL

## (undated) DEVICE — WAND INSULSCAN BX50 STRL

## (undated) DEVICE — GLV SURG SENSICARE W/ALOE PF LF 6.5 STRL

## (undated) DEVICE — 3M™ STERI-STRIP™ REINFORCED ADHESIVE SKIN CLOSURES, R1547, 1/2 IN X 4 IN (12 MM X 100 MM), 6 STRIPS/ENVELOPE: Brand: 3M™ STERI-STRIP™

## (undated) DEVICE — SPHINCTEROTOME: Brand: HYDRATOME RX 44

## (undated) DEVICE — SHEET,DRAPE,70X100,STERILE: Brand: MEDLINE

## (undated) DEVICE — MONOPOLAR METZENBAUM SCISSOR TIP, DISPOSABLE: Brand: MONOPOLAR METZENBAUM SCISSOR TIP, DISPOSABLE

## (undated) DEVICE — DISPOSABLE MONOPOLAR ENDOSCOPIC CORD 10 FT. (3M): Brand: KIRWAN

## (undated) DEVICE — SUT VIC 0/0 UR6 27IN DYED J603H

## (undated) DEVICE — PAD GRND REM POLYHESIVE A/ DISP

## (undated) DEVICE — SYR LUER SLPTP 50ML

## (undated) DEVICE — GLV SURG SENSICARE W/ALOE PF LF 7 STRL

## (undated) DEVICE — SOL IRRIG NACL 9PCT 1000ML BTL

## (undated) DEVICE — TP ELECTRD LAP L WR SPLIT33CM

## (undated) DEVICE — ENDOPATH XCEL UNIVERSAL TROCAR STABLILITY SLEEVES: Brand: ENDOPATH XCEL

## (undated) DEVICE — ENDOPATH XCEL BLADELESS TROCARS WITH STABILITY SLEEVES: Brand: ENDOPATH XCEL

## (undated) DEVICE — KT CATH CHOLANGIOGRA PERC W/BALLO

## (undated) DEVICE — DRSNG WND BORDR/ADHS NONADHR/GZ LF 4X4IN STRL

## (undated) DEVICE — STRIP,CLOSURE,WOUND,MEDI-STRIP,1/2X4: Brand: MEDLINE

## (undated) DEVICE — DEV LK WIREGUIDE FUSN OLYMP SCP

## (undated) DEVICE — BLD CLIP UNIV SURG GRY

## (undated) DEVICE — WIREGUIDED RETRIEVAL BASKET: Brand: TRAPEZOID RX

## (undated) DEVICE — SUCTION CANISTER, 1500CC, RIGID: Brand: DEROYAL

## (undated) DEVICE — SYR LUERLOK 10CC

## (undated) DEVICE — SYR LL TP 10ML STRL

## (undated) DEVICE — RICH GENERAL LAPAROSCOPY: Brand: MEDLINE INDUSTRIES, INC.

## (undated) DEVICE — KENDALL SCD EXPRESS SLEEVES, KNEE LENGTH, MEDIUM: Brand: KENDALL SCD

## (undated) DEVICE — UNDYED BRAIDED (POLYGLACTIN 910), SYNTHETIC ABSORBABLE SUTURE: Brand: COATED VICRYL

## (undated) DEVICE — 2, DISPOSABLE SUCTION/IRRIGATOR WITHOUT DISPOSABLE TIP: Brand: STRYKEFLOW